# Patient Record
Sex: MALE | Race: WHITE | NOT HISPANIC OR LATINO | Employment: OTHER | URBAN - NONMETROPOLITAN AREA
[De-identification: names, ages, dates, MRNs, and addresses within clinical notes are randomized per-mention and may not be internally consistent; named-entity substitution may affect disease eponyms.]

---

## 2023-03-21 DIAGNOSIS — F90.9 ATTENTION DEFICIT HYPERACTIVITY DISORDER (ADHD), UNSPECIFIED ADHD TYPE: ICD-10-CM

## 2023-03-21 RX ORDER — METHYLPHENIDATE HYDROCHLORIDE 5 MG/1
1 TABLET ORAL DAILY
COMMUNITY
Start: 2019-11-21 | End: 2023-03-21 | Stop reason: SDUPTHER

## 2023-03-21 RX ORDER — METHYLPHENIDATE HYDROCHLORIDE 5 MG/1
5 TABLET ORAL DAILY
Qty: 30 TABLET | Refills: 0 | Status: SHIPPED | OUTPATIENT
Start: 2023-03-21 | End: 2023-04-21 | Stop reason: SDUPTHER

## 2023-04-21 DIAGNOSIS — F90.9 ATTENTION DEFICIT HYPERACTIVITY DISORDER (ADHD), UNSPECIFIED ADHD TYPE: ICD-10-CM

## 2023-04-21 RX ORDER — METHYLPHENIDATE HYDROCHLORIDE 5 MG/1
5 TABLET ORAL DAILY
Qty: 30 TABLET | Refills: 0 | Status: SHIPPED | OUTPATIENT
Start: 2023-04-21 | End: 2023-05-22 | Stop reason: SDUPTHER

## 2023-05-17 DIAGNOSIS — R60.9 EDEMA, UNSPECIFIED: ICD-10-CM

## 2023-05-17 DIAGNOSIS — I10 ESSENTIAL (PRIMARY) HYPERTENSION: ICD-10-CM

## 2023-05-17 RX ORDER — METOPROLOL SUCCINATE 25 MG/1
TABLET, EXTENDED RELEASE ORAL
Qty: 45 TABLET | Refills: 3 | Status: SHIPPED | OUTPATIENT
Start: 2023-05-17 | End: 2024-03-04

## 2023-05-17 RX ORDER — FUROSEMIDE 20 MG/1
TABLET ORAL
Qty: 90 TABLET | Refills: 1 | Status: SHIPPED | OUTPATIENT
Start: 2023-05-17 | End: 2023-10-09

## 2023-05-22 DIAGNOSIS — F90.9 ATTENTION DEFICIT HYPERACTIVITY DISORDER (ADHD), UNSPECIFIED ADHD TYPE: ICD-10-CM

## 2023-05-23 RX ORDER — METHYLPHENIDATE HYDROCHLORIDE 5 MG/1
5 TABLET ORAL DAILY
Qty: 30 TABLET | Refills: 0 | Status: SHIPPED | OUTPATIENT
Start: 2023-05-23 | End: 2023-06-20 | Stop reason: SDUPTHER

## 2023-06-15 DIAGNOSIS — E78.2 MIXED HYPERLIPIDEMIA: ICD-10-CM

## 2023-06-15 DIAGNOSIS — K21.9 GASTROESOPHAGEAL REFLUX DISEASE WITHOUT ESOPHAGITIS: Primary | ICD-10-CM

## 2023-06-15 RX ORDER — ATORVASTATIN CALCIUM 40 MG/1
40 TABLET, FILM COATED ORAL NIGHTLY
COMMUNITY
End: 2023-06-15 | Stop reason: SDUPTHER

## 2023-06-15 RX ORDER — OMEPRAZOLE 20 MG/1
20 CAPSULE, DELAYED RELEASE ORAL DAILY
Qty: 90 CAPSULE | Refills: 3 | Status: SHIPPED | OUTPATIENT
Start: 2023-06-15 | End: 2024-03-04 | Stop reason: SDUPTHER

## 2023-06-15 RX ORDER — ATORVASTATIN CALCIUM 40 MG/1
40 TABLET, FILM COATED ORAL NIGHTLY
Qty: 90 TABLET | Refills: 3 | Status: SHIPPED | OUTPATIENT
Start: 2023-06-15 | End: 2024-03-04 | Stop reason: SDUPTHER

## 2023-06-15 RX ORDER — OMEPRAZOLE 20 MG/1
20 CAPSULE, DELAYED RELEASE ORAL DAILY
COMMUNITY
End: 2023-06-15 | Stop reason: SDUPTHER

## 2023-06-20 DIAGNOSIS — F90.9 ATTENTION DEFICIT HYPERACTIVITY DISORDER (ADHD), UNSPECIFIED ADHD TYPE: ICD-10-CM

## 2023-06-20 RX ORDER — METHYLPHENIDATE HYDROCHLORIDE 5 MG/1
5 TABLET ORAL DAILY
Qty: 30 TABLET | Refills: 0 | Status: SHIPPED | OUTPATIENT
Start: 2023-06-20 | End: 2023-07-21 | Stop reason: SDUPTHER

## 2023-07-18 DIAGNOSIS — I10 ESSENTIAL (PRIMARY) HYPERTENSION: ICD-10-CM

## 2023-07-18 RX ORDER — AMLODIPINE BESYLATE 5 MG/1
TABLET ORAL
Qty: 90 TABLET | Refills: 1 | Status: SHIPPED | OUTPATIENT
Start: 2023-07-18 | End: 2023-11-13

## 2023-07-21 DIAGNOSIS — F90.9 ATTENTION DEFICIT HYPERACTIVITY DISORDER (ADHD), UNSPECIFIED ADHD TYPE: ICD-10-CM

## 2023-07-21 RX ORDER — METHYLPHENIDATE HYDROCHLORIDE 5 MG/1
5 TABLET ORAL DAILY
Qty: 30 TABLET | Refills: 0 | Status: SHIPPED | OUTPATIENT
Start: 2023-07-21 | End: 2023-08-21 | Stop reason: SDUPTHER

## 2023-07-26 RX ORDER — BUPROPION HYDROCHLORIDE 150 MG/1
150 TABLET ORAL EVERY MORNING
COMMUNITY
Start: 2022-10-27 | End: 2023-11-07 | Stop reason: HOSPADM

## 2023-07-26 RX ORDER — TRAZODONE HYDROCHLORIDE 50 MG/1
TABLET ORAL
COMMUNITY
Start: 2023-02-22 | End: 2023-11-07 | Stop reason: HOSPADM

## 2023-07-26 RX ORDER — ASPIRIN 81 MG/1
1 TABLET ORAL DAILY
COMMUNITY
Start: 2019-11-21

## 2023-07-26 RX ORDER — BUPROPION HYDROCHLORIDE 300 MG/1
TABLET ORAL
COMMUNITY
Start: 2023-06-20

## 2023-07-26 RX ORDER — LISINOPRIL 10 MG/1
10 TABLET ORAL DAILY
COMMUNITY
End: 2023-08-11

## 2023-07-26 RX ORDER — VIBEGRON 75 MG/1
1 TABLET, FILM COATED ORAL DAILY
COMMUNITY
Start: 2022-11-02 | End: 2023-11-07 | Stop reason: HOSPADM

## 2023-07-26 RX ORDER — APIXABAN 5 MG/1
5 TABLET, FILM COATED ORAL 2 TIMES DAILY
COMMUNITY
End: 2024-03-04

## 2023-07-27 ENCOUNTER — OFFICE VISIT (OUTPATIENT)
Dept: PRIMARY CARE | Facility: CLINIC | Age: 78
End: 2023-07-27
Payer: MEDICARE

## 2023-07-27 VITALS
BODY MASS INDEX: 34.72 KG/M2 | HEART RATE: 71 BPM | SYSTOLIC BLOOD PRESSURE: 136 MMHG | DIASTOLIC BLOOD PRESSURE: 80 MMHG | WEIGHT: 216 LBS | HEIGHT: 66 IN

## 2023-07-27 DIAGNOSIS — K21.9 GASTROESOPHAGEAL REFLUX DISEASE WITHOUT ESOPHAGITIS: ICD-10-CM

## 2023-07-27 DIAGNOSIS — F41.9 ANXIETY: ICD-10-CM

## 2023-07-27 DIAGNOSIS — Z13.29 SCREENING FOR THYROID DISORDER: Primary | ICD-10-CM

## 2023-07-27 DIAGNOSIS — F33.41 RECURRENT MAJOR DEPRESSIVE DISORDER, IN PARTIAL REMISSION (CMS-HCC): ICD-10-CM

## 2023-07-27 DIAGNOSIS — I77.810 AORTIC ROOT DILATATION (CMS-HCC): ICD-10-CM

## 2023-07-27 DIAGNOSIS — I48.21 PERMANENT ATRIAL FIBRILLATION (MULTI): ICD-10-CM

## 2023-07-27 DIAGNOSIS — N52.9 ERECTILE DYSFUNCTION, UNSPECIFIED ERECTILE DYSFUNCTION TYPE: ICD-10-CM

## 2023-07-27 DIAGNOSIS — N40.1 BENIGN PROSTATIC HYPERPLASIA WITH URINARY OBSTRUCTION AND OTHER LOWER URINARY TRACT SYMPTOMS: ICD-10-CM

## 2023-07-27 DIAGNOSIS — E66.01 CLASS 2 SEVERE OBESITY WITH BODY MASS INDEX (BMI) OF 35 TO 39.9 WITH SERIOUS COMORBIDITY (MULTI): ICD-10-CM

## 2023-07-27 DIAGNOSIS — Z79.899 CONTROLLED SUBSTANCE AGREEMENT SIGNED: ICD-10-CM

## 2023-07-27 DIAGNOSIS — I50.22 CHRONIC SYSTOLIC CONGESTIVE HEART FAILURE (MULTI): ICD-10-CM

## 2023-07-27 DIAGNOSIS — N13.8 BENIGN PROSTATIC HYPERPLASIA WITH URINARY OBSTRUCTION AND OTHER LOWER URINARY TRACT SYMPTOMS: ICD-10-CM

## 2023-07-27 DIAGNOSIS — I10 ESSENTIAL HYPERTENSION: ICD-10-CM

## 2023-07-27 PROBLEM — E66.812 CLASS 2 SEVERE OBESITY WITH BODY MASS INDEX (BMI) OF 35 TO 39.9 WITH SERIOUS COMORBIDITY: Status: ACTIVE | Noted: 2023-07-27

## 2023-07-27 PROBLEM — F32.A DEPRESSION: Status: ACTIVE | Noted: 2023-07-27

## 2023-07-27 PROCEDURE — 3079F DIAST BP 80-89 MM HG: CPT | Performed by: INTERNAL MEDICINE

## 2023-07-27 PROCEDURE — 80365 DRUG SCREENING OXYCODONE: CPT

## 2023-07-27 PROCEDURE — 99214 OFFICE O/P EST MOD 30 MIN: CPT | Performed by: INTERNAL MEDICINE

## 2023-07-27 PROCEDURE — 80368 SEDATIVE HYPNOTICS: CPT

## 2023-07-27 PROCEDURE — 80354 DRUG SCREENING FENTANYL: CPT

## 2023-07-27 PROCEDURE — 80358 DRUG SCREENING METHADONE: CPT

## 2023-07-27 PROCEDURE — 80307 DRUG TEST PRSMV CHEM ANLYZR: CPT

## 2023-07-27 PROCEDURE — 1159F MED LIST DOCD IN RCRD: CPT | Performed by: INTERNAL MEDICINE

## 2023-07-27 PROCEDURE — 80373 DRUG SCREENING TRAMADOL: CPT

## 2023-07-27 PROCEDURE — 80361 OPIATES 1 OR MORE: CPT

## 2023-07-27 PROCEDURE — 1036F TOBACCO NON-USER: CPT | Performed by: INTERNAL MEDICINE

## 2023-07-27 PROCEDURE — 3075F SYST BP GE 130 - 139MM HG: CPT | Performed by: INTERNAL MEDICINE

## 2023-07-27 PROCEDURE — 80346 BENZODIAZEPINES1-12: CPT

## 2023-07-27 PROCEDURE — 1160F RVW MEDS BY RX/DR IN RCRD: CPT | Performed by: INTERNAL MEDICINE

## 2023-07-27 ASSESSMENT — ENCOUNTER SYMPTOMS
BACK PAIN: 0
WHEEZING: 0
SINUS PRESSURE: 0
VOMITING: 0
COUGH: 0
APPETITE CHANGE: 0
ABDOMINAL DISTENTION: 0
ACTIVITY CHANGE: 0
SHORTNESS OF BREATH: 0
CHILLS: 0
WEAKNESS: 0
NUMBNESS: 0
SORE THROAT: 0
NAUSEA: 0
DEPRESSION: 1
DIARRHEA: 0
FATIGUE: 0
SINUS PAIN: 0

## 2023-07-27 ASSESSMENT — PATIENT HEALTH QUESTIONNAIRE - PHQ9
2. FEELING DOWN, DEPRESSED OR HOPELESS: SEVERAL DAYS
SUM OF ALL RESPONSES TO PHQ9 QUESTIONS 1 AND 2: 2
1. LITTLE INTEREST OR PLEASURE IN DOING THINGS: SEVERAL DAYS

## 2023-07-27 NOTE — PROGRESS NOTES
"Subjective   Patient ID: Og Cooper is a 78 y.o. male who presents for Follow-up (6 month) and Depression (+mild).  DepressionPatient is not experiencing: shortness of breath.        Patient is here today for 6 mo follow up   Patient reports that he has been doing ok.    Review of Systems   Constitutional:  Negative for activity change, appetite change, chills and fatigue.   HENT:  Negative for congestion, postnasal drip, sinus pressure, sinus pain and sore throat.    Respiratory:  Negative for cough, shortness of breath and wheezing.    Cardiovascular:  Negative for chest pain and leg swelling.   Gastrointestinal:  Negative for abdominal distention, diarrhea, nausea and vomiting.   Musculoskeletal:  Negative for back pain.   Neurological:  Negative for weakness and numbness.   Psychiatric/Behavioral:  Positive for depression.        Objective   /80 (BP Location: Left arm, Patient Position: Sitting, BP Cuff Size: Adult)   Pulse 71   Ht 1.676 m (5' 6\")   Wt 98 kg (216 lb)   BMI 34.86 kg/m²     Physical Exam  Constitutional:       General: He is not in acute distress.     Appearance: Normal appearance.   HENT:      Head: Normocephalic.      Nose: Nose normal.      Mouth/Throat:      Mouth: Mucous membranes are dry.      Pharynx: No oropharyngeal exudate.   Eyes:      General:         Right eye: No discharge.         Left eye: No discharge.      Extraocular Movements: Extraocular movements intact.      Pupils: Pupils are equal, round, and reactive to light.   Cardiovascular:      Rate and Rhythm: Normal rate and regular rhythm.      Heart sounds: No murmur heard.     No gallop.   Pulmonary:      Effort: Pulmonary effort is normal. No respiratory distress.      Breath sounds: Normal breath sounds. No wheezing.   Musculoskeletal:         General: No swelling. Normal range of motion.   Skin:     General: Skin is warm and dry.      Coloration: Skin is not jaundiced.   Neurological:      General: No focal " deficit present.      Mental Status: He is alert and oriented to person, place, and time.      Cranial Nerves: No cranial nerve deficit.   Psychiatric:         Mood and Affect: Mood normal.         Behavior: Behavior normal.         OARRS:  No data recorded  I have personally reviewed the OARRS report for Og Cooper. I have considered the risks of abuse, dependence, addiction and diversion    Is the patient prescribed a combination of a benzodiazepine and opioid?  No    Last Urine Drug Screen / ordered today: Yes  Recent Results (from the past 67586 hour(s))   OPIATE/OPIOID/BENZO PRESCRIPTION COMPLIANCE    Collection Time: 07/21/22  1:36 PM   Result Value Ref Range    DRUG SCREEN COMMENT URINE SEE BELOW     Creatine, Urine 94.7 mg/dL    Amphetamine Screen, Urine PRESUMPTIVE NEGATIVE NEGATIVE    Barbiturate Screen, Urine PRESUMPTIVE NEGATIVE NEGATIVE    Cannabinoid Screen, Urine PRESUMPTIVE NEGATIVE NEGATIVE    Cocaine Screen, Urine PRESUMPTIVE NEGATIVE NEGATIVE    PCP Screen, Urine PRESUMPTIVE NEGATIVE NEGATIVE    7-Aminoclonazepam <25 Cutoff <25 ng/mL    Alpha-Hydroxyalprazolam <25 Cutoff <25 ng/mL    Alpha-Hydroxymidazolam <25 Cutoff <25 ng/mL    Alprazolam <25 Cutoff <25 ng/mL    Chlordiazepoxide <25 Cutoff <25 ng/mL    Clonazepam <25 Cutoff <25 ng/mL    Diazepam <25 Cutoff <25 ng/mL    Lorazepam <25 Cutoff <25 ng/mL    Midazolam <25 Cutoff <25 ng/mL    Nordiazepam <25 Cutoff <25 ng/mL    Oxazepam <25 Cutoff <25 ng/mL    Temazepam <25 Cutoff <25 ng/mL    Zolpidem <25 Cutoff <25 ng/mL    Zolpidem Metabolite (ZCA) <25 Cutoff <25 ng/mL    6-Acetylmorphine <25 Cutoff <25 ng/mL    Codeine <50 Cutoff <50 ng/mL    Hydrocodone <25 Cutoff <25 ng/mL    Hydromorphone <25 Cutoff <25 ng/mL    Morphine Urine <50 Cutoff <50 ng/mL    Norhydrocodone <25 Cutoff <25 ng/mL    Noroxycodone <25 Cutoff <25 ng/mL    Oxycodone <25 Cutoff <25 ng/mL    Oxymorphone <25 Cutoff <25 ng/mL    Tramadol <50 Cutoff <50 ng/mL     O-Desmethyltramadol <50 Cutoff <50 ng/mL    Fentanyl <2.5 Cutoff<2.5 ng/mL    Norfentanyl <2.5 Cutoff<2.5 ng/mL    METHADONE CONFIRMATION,URINE <25 Cutoff <25 ng/mL    EDDP <25 Cutoff <25 ng/mL     N/A    Controlled Substance Agreement:  Date of the Last Agreement: 07/27/2023  Reviewed Controlled Substance Agreement including but not limited to the benefits, risks, and alternatives to treatment with a Controlled Substance medication(s).    Stimulants:   What is the patient's goal of therapy? depression  Is this being achieved with current treatment? yes    Activities of Daily Living:   Is your overall impression that this patient is benefiting (symptom reduction outweighs side effects) from stimulant therapy? Yes     1. Physical Functioning: Better  2. Family Relationship: Better  3. Social Relationship: Better  4. Mood: Better  5. Sleep Patterns: Better  6. Overall Function: Better      Assessment/Plan   Problem List Items Addressed This Visit       Anxiety    Atrial fibrillation (CMS/HCC)    Benign prostatic hyperplasia with urinary obstruction and other lower urinary tract symptoms    Class 2 severe obesity with body mass index (BMI) of 35 to 39.9 with serious comorbidity (CMS/HCC)    Depression    Erectile dysfunction    Essential hypertension    GERD (gastroesophageal reflux disease)     Other Visit Diagnoses       Screening for thyroid disorder    -  Primary    Controlled substance agreement signed              1. A fib, stable   - follows with cardiology and EP at [facility]   - currently on eliquis 5mg po bid  -  metoprolol 25mg po 0.5tab daily     2. HTN, stable   - continue Norvasc 5mg po daily  -  lisinopril 10mg po daily     3. HLD, stable   - on Lipitor 40mg po daily      4. Depression, stable  - continue Wellbutrin  - methylphenidate on 5mg po daily, CSA and UDS updated today   - I have personally reviewed this patient's OARRS report and found it to be appropriate. The report has been uploaded into the  medical record. I have considered the risks of abuse, addiction, dependence, and diversion and feel that it is clinically appropriate for this patient to be prescribed this controlled medication.         5. Will order tsh per patient request   Final diagnoses:   [Z13.29] Screening for thyroid disorder   [Z79.899] Controlled substance agreement signed   [I10] Essential hypertension   [I48.21] Permanent atrial fibrillation (CMS/[facility])   [E66.01] Class 2 severe obesity with body mass index (BMI) of 35 to 39.9 with serious comorbidity (CMS/[facility])   [K21.9] Gastroesophageal reflux disease without esophagitis   [N52.9] Erectile dysfunction, unspecified erectile dysfunction type   [N40.1, N13.8] Benign prostatic hyperplasia with urinary obstruction and other lower urinary tract symptoms   [F33.41] Recurrent major depressive disorder, in partial remission (CMS/[facility])   [F41.9] Anxiety

## 2023-08-01 LAB
6-ACETYLMORPHINE: <25 NG/ML
7-AMINOCLONAZEPAM: <25 NG/ML
ALPHA-HYDROXYALPRAZOLAM: <25 NG/ML
ALPHA-HYDROXYMIDAZOLAM: <25 NG/ML
ALPRAZOLAM: <25 NG/ML
AMPHETAMINE (PRESENCE) IN URINE BY SCREEN METHOD: NORMAL
BARBITURATES PRESENCE IN URINE BY SCREEN METHOD: NORMAL
CANNABINOIDS IN URINE BY SCREEN METHOD: NORMAL
CHLORDIAZEPOXIDE: <25 NG/ML
CLONAZEPAM: <25 NG/ML
COCAINE (PRESENCE) IN URINE BY SCREEN METHOD: NORMAL
CODEINE: <50 NG/ML
CREATINE, URINE FOR DRUG: 48.8 MG/DL
DIAZEPAM: <25 NG/ML
DRUG SCREEN COMMENT URINE: NORMAL
EDDP: <25 NG/ML
FENTANYL CONFIRMATION, URINE: <2.5 NG/ML
HYDROCODONE: <25 NG/ML
HYDROMORPHONE: <25 NG/ML
LORAZEPAM: <25 NG/ML
METHADONE CONFIRMATION,URINE: <25 NG/ML
MIDAZOLAM: <25 NG/ML
MORPHINE URINE: <50 NG/ML
NORDIAZEPAM: <25 NG/ML
NORFENTANYL: <2.5 NG/ML
NORHYDROCODONE: <25 NG/ML
NOROXYCODONE: <25 NG/ML
O-DESMETHYLTRAMADOL: <50 NG/ML
OXAZEPAM: <25 NG/ML
OXYCODONE: <25 NG/ML
OXYMORPHONE: <25 NG/ML
PHENCYCLIDINE (PRESENCE) IN URINE BY SCREEN METHOD: NORMAL
TEMAZEPAM: <25 NG/ML
TRAMADOL: <50 NG/ML
ZOLPIDEM METABOLITE (ZCA): <25 NG/ML
ZOLPIDEM: <25 NG/ML

## 2023-08-11 DIAGNOSIS — I10 ESSENTIAL (PRIMARY) HYPERTENSION: ICD-10-CM

## 2023-08-11 RX ORDER — LISINOPRIL 10 MG/1
10 TABLET ORAL DAILY
Qty: 90 TABLET | Refills: 3 | Status: SHIPPED | OUTPATIENT
Start: 2023-08-11 | End: 2024-03-04 | Stop reason: SDUPTHER

## 2023-08-21 DIAGNOSIS — F90.9 ATTENTION DEFICIT HYPERACTIVITY DISORDER (ADHD), UNSPECIFIED ADHD TYPE: ICD-10-CM

## 2023-08-22 RX ORDER — METHYLPHENIDATE HYDROCHLORIDE 5 MG/1
5 TABLET ORAL DAILY
Qty: 30 TABLET | Refills: 0 | Status: SHIPPED | OUTPATIENT
Start: 2023-08-22 | End: 2023-09-20 | Stop reason: SDUPTHER

## 2023-08-28 ENCOUNTER — OFFICE VISIT (OUTPATIENT)
Dept: PRIMARY CARE | Facility: CLINIC | Age: 78
End: 2023-08-28
Payer: MEDICARE

## 2023-08-28 VITALS
WEIGHT: 216 LBS | BODY MASS INDEX: 34.72 KG/M2 | DIASTOLIC BLOOD PRESSURE: 78 MMHG | HEART RATE: 69 BPM | SYSTOLIC BLOOD PRESSURE: 133 MMHG | HEIGHT: 66 IN

## 2023-08-28 DIAGNOSIS — G89.29 CHRONIC BILATERAL LOW BACK PAIN WITHOUT SCIATICA: Primary | ICD-10-CM

## 2023-08-28 DIAGNOSIS — M54.50 CHRONIC BILATERAL LOW BACK PAIN WITHOUT SCIATICA: Primary | ICD-10-CM

## 2023-08-28 PROCEDURE — 1036F TOBACCO NON-USER: CPT | Performed by: INTERNAL MEDICINE

## 2023-08-28 PROCEDURE — 1160F RVW MEDS BY RX/DR IN RCRD: CPT | Performed by: INTERNAL MEDICINE

## 2023-08-28 PROCEDURE — 3078F DIAST BP <80 MM HG: CPT | Performed by: INTERNAL MEDICINE

## 2023-08-28 PROCEDURE — 99213 OFFICE O/P EST LOW 20 MIN: CPT | Performed by: INTERNAL MEDICINE

## 2023-08-28 PROCEDURE — 1159F MED LIST DOCD IN RCRD: CPT | Performed by: INTERNAL MEDICINE

## 2023-08-28 PROCEDURE — 3075F SYST BP GE 130 - 139MM HG: CPT | Performed by: INTERNAL MEDICINE

## 2023-08-28 RX ORDER — LIDOCAINE 50 MG/G
1 PATCH TOPICAL DAILY
Qty: 10 PATCH | Refills: 0 | Status: SHIPPED | OUTPATIENT
Start: 2023-08-28 | End: 2023-11-07 | Stop reason: HOSPADM

## 2023-08-28 ASSESSMENT — ENCOUNTER SYMPTOMS
APPETITE CHANGE: 0
WHEEZING: 0
NAUSEA: 0
BACK PAIN: 0
SINUS PRESSURE: 0
WEAKNESS: 0
SORE THROAT: 0
NUMBNESS: 0
COUGH: 0
CHILLS: 0
ABDOMINAL DISTENTION: 0
SHORTNESS OF BREATH: 0
DIARRHEA: 0
FATIGUE: 0
SINUS PAIN: 0
ACTIVITY CHANGE: 0
VOMITING: 0

## 2023-08-28 NOTE — PROGRESS NOTES
"Subjective   Patient ID: Og Cooper is a 78 y.o. male who presents for Hip Pain (RT side/+Leg pain/Present for 2+ weeks).  HPI  Patient is here today for hip pain.   Patient is here today with his daughter.  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 The day after this last excursion he was quite achey and tried.     Review of Systems   Constitutional:  Negative for activity change, appetite change, chills and fatigue.   HENT:  Negative for congestion, postnasal drip, sinus pressure, sinus pain and sore throat.    Respiratory:  Negative for cough, shortness of breath and wheezing.    Cardiovascular:  Negative for chest pain and leg swelling.   Gastrointestinal:  Negative for abdominal distention, diarrhea, nausea and vomiting.   Musculoskeletal:  Negative for back pain.   Neurological:  Negative for weakness and numbness.       Objective   /78 (BP Location: Left arm, Patient Position: Sitting, BP Cuff Size: Adult)   Pulse 69   Ht 1.676 m (5' 6\")   Wt 98 kg (216 lb)   BMI 34.86 kg/m²     Physical Exam  Constitutional:       General: He is not in acute distress.     Appearance: Normal appearance.   HENT:      Head: Normocephalic.      Nose: Nose normal.      Mouth/Throat:      Mouth: Mucous membranes are dry.      Pharynx: No oropharyngeal exudate.   Eyes:      General:         Right eye: No discharge.         Left eye: No discharge.      Extraocular Movements: Extraocular movements intact.      Pupils: Pupils are equal, round, and reactive to light.   Cardiovascular:      Rate and Rhythm: Normal rate and regular rhythm.      Heart sounds: No murmur heard.     No gallop.   Pulmonary:      Effort: Pulmonary effort is normal. No respiratory distress.      Breath sounds: Normal breath sounds. No " wheezing.   Musculoskeletal:         General: Tenderness present. No swelling. Normal range of motion.      Comments: +straight leg slightly positive at the right    Skin:     General: Skin is warm and dry.      Coloration: Skin is not jaundiced.   Neurological:      General: No focal deficit present.      Mental Status: He is alert and oriented to person, place, and time.      Cranial Nerves: No cranial nerve deficit.   Psychiatric:         Mood and Affect: Mood normal.         Behavior: Behavior normal.           Assessment/Plan   Problem List Items Addressed This Visit    None  Visit Diagnoses       Chronic bilateral low back pain without sciatica    -  Primary    Relevant Medications    lidocaine (Lidoderm) 5 % patch    Other Relevant Orders    XR lumbar spine 2-3 views    Referral to Physical Therapy          1. Back pain   - reviewed right hip xray, will order lumbar   - on prednisone taper   - continue tylenol   - order lidoderm patches   - will order pt   Final diagnoses:   [M54.50, G89.29] Chronic bilateral low back pain without sciatica

## 2023-08-31 ENCOUNTER — TELEMEDICINE (OUTPATIENT)
Dept: PRIMARY CARE | Facility: CLINIC | Age: 78
End: 2023-08-31
Payer: MEDICARE

## 2023-08-31 DIAGNOSIS — M47.816 SPONDYLOSIS OF LUMBAR REGION WITHOUT MYELOPATHY OR RADICULOPATHY: Primary | ICD-10-CM

## 2023-08-31 DIAGNOSIS — U07.1 COVID-19: ICD-10-CM

## 2023-08-31 PROCEDURE — 99213 OFFICE O/P EST LOW 20 MIN: CPT | Performed by: INTERNAL MEDICINE

## 2023-08-31 ASSESSMENT — ENCOUNTER SYMPTOMS
FATIGUE: 1
HIP PAIN: 1
FEVER: 1
RHINORRHEA: 1

## 2023-08-31 NOTE — PROGRESS NOTES
Subjective   Patient ID: Og Cooper is a 78 y.o. male who presents for Hip Pain.  Hip Pain     Patient is here today for virtual visit.  Patient and physician are at two separate locations.  Pt was verbally consented for the encounter.   Patient is here today for follow up on Hip pain.  Patient did get xray, lumbar xray shows moderate degenerative changes at L2-3, l3-l4, l5-s1.  He had made this appt because he was coming down with a cold,  but he tested positive for covid; there is an outbreak in his [facility] care. He is having coughing and increased congestion.   Patient reports that now that he is down resting his hip pain has actually improved some.     Review of Systems   Constitutional:  Positive for fatigue and fever.   HENT:  Positive for congestion and rhinorrhea.        Objective   There were no vitals taken for this visit.    Physical Exam  No physical exam due to being a virtual visit.       Assessment/Plan   Problem List Items Addressed This Visit       Spondylosis of lumbar region without myelopathy or radiculopathy - Primary    COVID-19   COVID19  - pt reports very mild symptoms, declines paxlovid or further medication at this time, advised if he changes his mild or worsens to please call     2. Mod lumbar deg changes, I think this is causing referred pain to his hip   - can proceed with therapy as soon as out of covid isolation     15 min was spent on virtual encounter with pt.         Final diagnoses:   [M47.816] Spondylosis of lumbar region without myelopathy or radiculopathy   [U07.1] COVID-19

## 2023-09-20 DIAGNOSIS — F90.9 ATTENTION DEFICIT HYPERACTIVITY DISORDER (ADHD), UNSPECIFIED ADHD TYPE: ICD-10-CM

## 2023-09-20 RX ORDER — METHYLPHENIDATE HYDROCHLORIDE 5 MG/1
5 TABLET ORAL DAILY
Qty: 30 TABLET | Refills: 0 | Status: SHIPPED | OUTPATIENT
Start: 2023-09-20 | End: 2023-10-23 | Stop reason: SDUPTHER

## 2023-10-08 DIAGNOSIS — R60.9 EDEMA, UNSPECIFIED: ICD-10-CM

## 2023-10-09 RX ORDER — FUROSEMIDE 20 MG/1
TABLET ORAL
Qty: 90 TABLET | Refills: 1 | Status: SHIPPED | OUTPATIENT
Start: 2023-10-09 | End: 2024-03-04

## 2023-10-17 ENCOUNTER — LAB (OUTPATIENT)
Dept: LAB | Facility: LAB | Age: 78
End: 2023-10-17
Payer: MEDICARE

## 2023-10-17 DIAGNOSIS — Z13.29 SCREENING FOR THYROID DISORDER: ICD-10-CM

## 2023-10-17 LAB — TSH SERPL-ACNC: 0.79 MIU/L (ref 0.44–3.98)

## 2023-10-17 PROCEDURE — 84443 ASSAY THYROID STIM HORMONE: CPT

## 2023-10-17 PROCEDURE — 36415 COLL VENOUS BLD VENIPUNCTURE: CPT

## 2023-10-23 DIAGNOSIS — F90.9 ATTENTION DEFICIT HYPERACTIVITY DISORDER (ADHD), UNSPECIFIED ADHD TYPE: ICD-10-CM

## 2023-10-23 RX ORDER — METHYLPHENIDATE HYDROCHLORIDE 5 MG/1
5 TABLET ORAL DAILY
Qty: 30 TABLET | Refills: 0 | Status: SHIPPED | OUTPATIENT
Start: 2023-10-23 | End: 2023-11-22 | Stop reason: SDUPTHER

## 2023-10-26 ENCOUNTER — APPOINTMENT (OUTPATIENT)
Dept: PRIMARY CARE | Facility: CLINIC | Age: 78
End: 2023-10-26
Payer: MEDICARE

## 2023-10-30 ENCOUNTER — TELEPHONE (OUTPATIENT)
Dept: PRIMARY CARE | Facility: CLINIC | Age: 78
End: 2023-10-30
Payer: MEDICARE

## 2023-10-30 DIAGNOSIS — F41.9 ANXIETY: Primary | ICD-10-CM

## 2023-10-30 NOTE — TELEPHONE ENCOUNTER
Would like something added to his Wellbutrin; has tried Lexapro 10 in the past and would like to go back on this medication

## 2023-11-05 ENCOUNTER — APPOINTMENT (OUTPATIENT)
Dept: CARDIOLOGY | Facility: HOSPITAL | Age: 78
End: 2023-11-05
Payer: MEDICARE

## 2023-11-05 ENCOUNTER — APPOINTMENT (OUTPATIENT)
Dept: RADIOLOGY | Facility: HOSPITAL | Age: 78
End: 2023-11-05
Payer: MEDICARE

## 2023-11-05 ENCOUNTER — HOSPITAL ENCOUNTER (OUTPATIENT)
Facility: HOSPITAL | Age: 78
Setting detail: OBSERVATION
Discharge: HOME | End: 2023-11-07
Attending: EMERGENCY MEDICINE | Admitting: HOSPITALIST
Payer: MEDICARE

## 2023-11-05 DIAGNOSIS — R29.898 RIGHT HAND WEAKNESS: ICD-10-CM

## 2023-11-05 DIAGNOSIS — I63.9 CEREBROVASCULAR ACCIDENT (CVA), UNSPECIFIED MECHANISM (MULTI): Primary | ICD-10-CM

## 2023-11-05 DIAGNOSIS — I63.89 OTHER CEREBRAL INFARCTION (MULTI): ICD-10-CM

## 2023-11-05 DIAGNOSIS — G45.8 OTHER TRANSIENT CEREBRAL ISCHEMIC ATTACKS AND RELATED SYNDROMES: ICD-10-CM

## 2023-11-05 DIAGNOSIS — R09.89 OTHER SPECIFIED SYMPTOMS AND SIGNS INVOLVING THE CIRCULATORY AND RESPIRATORY SYSTEMS: ICD-10-CM

## 2023-11-05 DIAGNOSIS — G45.9 TIA (TRANSIENT ISCHEMIC ATTACK): ICD-10-CM

## 2023-11-05 LAB
ALBUMIN SERPL BCP-MCNC: 4.2 G/DL (ref 3.4–5)
ALBUMIN SERPL BCP-MCNC: 4.2 G/DL (ref 3.4–5)
ALP SERPL-CCNC: 93 U/L (ref 33–136)
ALP SERPL-CCNC: 93 U/L (ref 33–136)
ALT SERPL W P-5'-P-CCNC: 23 U/L (ref 10–52)
ALT SERPL W P-5'-P-CCNC: 23 U/L (ref 10–52)
ANION GAP SERPL CALC-SCNC: 9 MMOL/L (ref 10–20)
APPEARANCE UR: CLEAR
APTT PPP: 39 SECONDS (ref 27–38)
AST SERPL W P-5'-P-CCNC: 25 U/L (ref 9–39)
AST SERPL W P-5'-P-CCNC: 25 U/L (ref 9–39)
BASOPHILS # BLD AUTO: 0.03 X10*3/UL (ref 0–0.1)
BASOPHILS NFR BLD AUTO: 0.4 %
BILIRUB DIRECT SERPL-MCNC: 0.1 MG/DL (ref 0–0.3)
BILIRUB SERPL-MCNC: 0.6 MG/DL (ref 0–1.2)
BILIRUB SERPL-MCNC: 0.8 MG/DL (ref 0–1.2)
BILIRUB UR STRIP.AUTO-MCNC: NEGATIVE MG/DL
BNP SERPL-MCNC: 271 PG/ML (ref 0–99)
BUN SERPL-MCNC: 28 MG/DL (ref 6–23)
CALCIUM SERPL-MCNC: 9 MG/DL (ref 8.6–10.3)
CARDIAC TROPONIN I PNL SERPL HS: 31 NG/L (ref 0–20)
CHLORIDE SERPL-SCNC: 107 MMOL/L (ref 98–107)
CHOLEST SERPL-MCNC: 147 MG/DL (ref 0–199)
CHOLESTEROL/HDL RATIO: 4.3
CO2 SERPL-SCNC: 26 MMOL/L (ref 21–32)
COLOR UR: NORMAL
CREAT SERPL-MCNC: 1.39 MG/DL (ref 0.5–1.3)
EOSINOPHIL # BLD AUTO: 0.53 X10*3/UL (ref 0–0.4)
EOSINOPHIL NFR BLD AUTO: 6.6 %
ERYTHROCYTE [DISTWIDTH] IN BLOOD BY AUTOMATED COUNT: 14.7 % (ref 11.5–14.5)
GFR SERPL CREATININE-BSD FRML MDRD: 53 ML/MIN/1.73M*2
GLUCOSE SERPL-MCNC: 117 MG/DL (ref 74–99)
GLUCOSE UR STRIP.AUTO-MCNC: NEGATIVE MG/DL
HCT VFR BLD AUTO: 41.3 % (ref 41–52)
HDLC SERPL-MCNC: 34 MG/DL
HGB BLD-MCNC: 13.2 G/DL (ref 13.5–17.5)
HOLD SPECIMEN: NORMAL
IMM GRANULOCYTES # BLD AUTO: 0.03 X10*3/UL (ref 0–0.5)
IMM GRANULOCYTES NFR BLD AUTO: 0.4 % (ref 0–0.9)
INR PPP: 1.7 (ref 0.9–1.1)
KETONES UR STRIP.AUTO-MCNC: NEGATIVE MG/DL
LDLC SERPL CALC-MCNC: 88 MG/DL
LEUKOCYTE ESTERASE UR QL STRIP.AUTO: NEGATIVE
LYMPHOCYTES # BLD AUTO: 1.39 X10*3/UL (ref 0.8–3)
LYMPHOCYTES NFR BLD AUTO: 17.4 %
MCH RBC QN AUTO: 27.2 PG (ref 26–34)
MCHC RBC AUTO-ENTMCNC: 32 G/DL (ref 32–36)
MCV RBC AUTO: 85 FL (ref 80–100)
MONOCYTES # BLD AUTO: 0.66 X10*3/UL (ref 0.05–0.8)
MONOCYTES NFR BLD AUTO: 8.3 %
NEUTROPHILS # BLD AUTO: 5.36 X10*3/UL (ref 1.6–5.5)
NEUTROPHILS NFR BLD AUTO: 66.9 %
NITRITE UR QL STRIP.AUTO: NEGATIVE
NON HDL CHOLESTEROL: 113 MG/DL (ref 0–149)
NRBC BLD-RTO: 0 /100 WBCS (ref 0–0)
PH UR STRIP.AUTO: 5 [PH]
PLATELET # BLD AUTO: 161 X10*3/UL (ref 150–450)
POTASSIUM SERPL-SCNC: 4.4 MMOL/L (ref 3.5–5.3)
PROT SERPL-MCNC: 6.7 G/DL (ref 6.4–8.2)
PROT SERPL-MCNC: 7.1 G/DL (ref 6.4–8.2)
PROT UR STRIP.AUTO-MCNC: NEGATIVE MG/DL
PROTHROMBIN TIME: 19.2 SECONDS (ref 9.8–12.8)
RBC # BLD AUTO: 4.86 X10*6/UL (ref 4.5–5.9)
RBC # UR STRIP.AUTO: NEGATIVE /UL
SODIUM SERPL-SCNC: 138 MMOL/L (ref 136–145)
SP GR UR STRIP.AUTO: 1.01
TRIGL SERPL-MCNC: 127 MG/DL (ref 0–149)
UROBILINOGEN UR STRIP.AUTO-MCNC: <2 MG/DL
VLDL: 25 MG/DL (ref 0–40)
WBC # BLD AUTO: 8 X10*3/UL (ref 4.4–11.3)

## 2023-11-05 PROCEDURE — 70450 CT HEAD/BRAIN W/O DYE: CPT

## 2023-11-05 PROCEDURE — 85610 PROTHROMBIN TIME: CPT | Performed by: PHYSICIAN ASSISTANT

## 2023-11-05 PROCEDURE — 2500000001 HC RX 250 WO HCPCS SELF ADMINISTERED DRUGS (ALT 637 FOR MEDICARE OP): Performed by: HOSPITALIST

## 2023-11-05 PROCEDURE — 80053 COMPREHEN METABOLIC PANEL: CPT | Performed by: PHYSICIAN ASSISTANT

## 2023-11-05 PROCEDURE — 99223 1ST HOSP IP/OBS HIGH 75: CPT | Performed by: HOSPITALIST

## 2023-11-05 PROCEDURE — 93005 ELECTROCARDIOGRAM TRACING: CPT

## 2023-11-05 PROCEDURE — 85730 THROMBOPLASTIN TIME PARTIAL: CPT | Performed by: PHYSICIAN ASSISTANT

## 2023-11-05 PROCEDURE — 94660 CPAP INITIATION&MGMT: CPT

## 2023-11-05 PROCEDURE — 96360 HYDRATION IV INFUSION INIT: CPT

## 2023-11-05 PROCEDURE — 36415 COLL VENOUS BLD VENIPUNCTURE: CPT | Performed by: PHYSICIAN ASSISTANT

## 2023-11-05 PROCEDURE — 83880 ASSAY OF NATRIURETIC PEPTIDE: CPT | Performed by: HOSPITALIST

## 2023-11-05 PROCEDURE — 36415 COLL VENOUS BLD VENIPUNCTURE: CPT | Performed by: HOSPITALIST

## 2023-11-05 PROCEDURE — 99285 EMERGENCY DEPT VISIT HI MDM: CPT | Mod: 25 | Performed by: EMERGENCY MEDICINE

## 2023-11-05 PROCEDURE — 80076 HEPATIC FUNCTION PANEL: CPT | Mod: CCI | Performed by: HOSPITALIST

## 2023-11-05 PROCEDURE — 9420000001 HC RT PATIENT EDUCATION 5 MIN

## 2023-11-05 PROCEDURE — G0378 HOSPITAL OBSERVATION PER HR: HCPCS

## 2023-11-05 PROCEDURE — 94664 DEMO&/EVAL PT USE INHALER: CPT

## 2023-11-05 PROCEDURE — 80061 LIPID PANEL: CPT | Performed by: HOSPITALIST

## 2023-11-05 PROCEDURE — 71045 X-RAY EXAM CHEST 1 VIEW: CPT | Mod: FY,FR

## 2023-11-05 PROCEDURE — 2500000004 HC RX 250 GENERAL PHARMACY W/ HCPCS (ALT 636 FOR OP/ED): Performed by: PHYSICIAN ASSISTANT

## 2023-11-05 PROCEDURE — 71045 X-RAY EXAM CHEST 1 VIEW: CPT | Mod: FOREIGN READ | Performed by: RADIOLOGY

## 2023-11-05 PROCEDURE — 84484 ASSAY OF TROPONIN QUANT: CPT | Performed by: PHYSICIAN ASSISTANT

## 2023-11-05 PROCEDURE — 81003 URINALYSIS AUTO W/O SCOPE: CPT | Performed by: PHYSICIAN ASSISTANT

## 2023-11-05 PROCEDURE — 93010 ELECTROCARDIOGRAM REPORT: CPT | Performed by: STUDENT IN AN ORGANIZED HEALTH CARE EDUCATION/TRAINING PROGRAM

## 2023-11-05 PROCEDURE — 70450 CT HEAD/BRAIN W/O DYE: CPT | Performed by: RADIOLOGY

## 2023-11-05 PROCEDURE — 85025 COMPLETE CBC W/AUTO DIFF WBC: CPT | Performed by: PHYSICIAN ASSISTANT

## 2023-11-05 RX ORDER — PANTOPRAZOLE SODIUM 20 MG/1
20 TABLET, DELAYED RELEASE ORAL
Status: DISCONTINUED | OUTPATIENT
Start: 2023-11-06 | End: 2023-11-07 | Stop reason: HOSPADM

## 2023-11-05 RX ORDER — ASPIRIN 81 MG/1
81 TABLET ORAL DAILY
Status: DISCONTINUED | OUTPATIENT
Start: 2023-11-06 | End: 2023-11-07 | Stop reason: HOSPADM

## 2023-11-05 RX ORDER — ACETAMINOPHEN, DIPHENHYDRAMINE HCL, PHENYLEPHRINE HCL 325; 25; 5 MG/1; MG/1; MG/1
1 TABLET ORAL NIGHTLY
COMMUNITY

## 2023-11-05 RX ORDER — SODIUM CHLORIDE 9 MG/ML
75 INJECTION, SOLUTION INTRAVENOUS CONTINUOUS
Status: DISCONTINUED | OUTPATIENT
Start: 2023-11-05 | End: 2023-11-06

## 2023-11-05 RX ORDER — TRAZODONE HYDROCHLORIDE 50 MG/1
50 TABLET ORAL NIGHTLY
Status: DISCONTINUED | OUTPATIENT
Start: 2023-11-05 | End: 2023-11-06

## 2023-11-05 RX ORDER — ATORVASTATIN CALCIUM 40 MG/1
40 TABLET, FILM COATED ORAL NIGHTLY
Status: DISCONTINUED | OUTPATIENT
Start: 2023-11-05 | End: 2023-11-07 | Stop reason: HOSPADM

## 2023-11-05 RX ADMIN — ATORVASTATIN CALCIUM 40 MG: 40 TABLET, FILM COATED ORAL at 22:11

## 2023-11-05 RX ADMIN — TRAZODONE HYDROCHLORIDE 50 MG: 50 TABLET ORAL at 22:11

## 2023-11-05 RX ADMIN — SODIUM CHLORIDE 500 ML: 9 INJECTION, SOLUTION INTRAVENOUS at 12:09

## 2023-11-05 SDOH — SOCIAL STABILITY: SOCIAL INSECURITY: DO YOU FEEL ANYONE HAS EXPLOITED OR TAKEN ADVANTAGE OF YOU FINANCIALLY OR OF YOUR PERSONAL PROPERTY?: NO

## 2023-11-05 SDOH — SOCIAL STABILITY: SOCIAL INSECURITY: HAVE YOU HAD THOUGHTS OF HARMING ANYONE ELSE?: NO

## 2023-11-05 SDOH — SOCIAL STABILITY: SOCIAL INSECURITY: DOES ANYONE TRY TO KEEP YOU FROM HAVING/CONTACTING OTHER FRIENDS OR DOING THINGS OUTSIDE YOUR HOME?: NO

## 2023-11-05 SDOH — SOCIAL STABILITY: SOCIAL INSECURITY: ARE THERE ANY APPARENT SIGNS OF INJURIES/BEHAVIORS THAT COULD BE RELATED TO ABUSE/NEGLECT?: NO

## 2023-11-05 SDOH — SOCIAL STABILITY: SOCIAL INSECURITY: HAS ANYONE EVER THREATENED TO HURT YOUR FAMILY OR YOUR PETS?: NO

## 2023-11-05 SDOH — SOCIAL STABILITY: SOCIAL INSECURITY: WERE YOU ABLE TO COMPLETE ALL THE BEHAVIORAL HEALTH SCREENINGS?: YES

## 2023-11-05 SDOH — SOCIAL STABILITY: SOCIAL INSECURITY: ABUSE: ADULT

## 2023-11-05 SDOH — SOCIAL STABILITY: SOCIAL INSECURITY: ARE YOU OR HAVE YOU BEEN THREATENED OR ABUSED PHYSICALLY, EMOTIONALLY, OR SEXUALLY BY ANYONE?: NO

## 2023-11-05 SDOH — SOCIAL STABILITY: SOCIAL INSECURITY: DO YOU FEEL UNSAFE GOING BACK TO THE PLACE WHERE YOU ARE LIVING?: NO

## 2023-11-05 ASSESSMENT — ACTIVITIES OF DAILY LIVING (ADL)
GROOMING: INDEPENDENT
HEARING - RIGHT EAR: FUNCTIONAL
DRESSING YOURSELF: INDEPENDENT
WALKS IN HOME: INDEPENDENT
JUDGMENT_ADEQUATE_SAFELY_COMPLETE_DAILY_ACTIVITIES: YES
TOILETING: INDEPENDENT
PATIENT'S MEMORY ADEQUATE TO SAFELY COMPLETE DAILY ACTIVITIES?: YES
HEARING - LEFT EAR: FUNCTIONAL
FEEDING YOURSELF: INDEPENDENT
LACK_OF_TRANSPORTATION: NO
ADEQUATE_TO_COMPLETE_ADL: YES
BATHING: INDEPENDENT

## 2023-11-05 ASSESSMENT — COGNITIVE AND FUNCTIONAL STATUS - GENERAL
DAILY ACTIVITIY SCORE: 24
MOBILITY SCORE: 24
PATIENT BASELINE BEDBOUND: NO

## 2023-11-05 ASSESSMENT — LIFESTYLE VARIABLES
HOW OFTEN DO YOU HAVE 6 OR MORE DRINKS ON ONE OCCASION: NEVER
PRESCIPTION_ABUSE_PAST_12_MONTHS: NO
HOW MANY STANDARD DRINKS CONTAINING ALCOHOL DO YOU HAVE ON A TYPICAL DAY: PATIENT DOES NOT DRINK
SUBSTANCE_ABUSE_PAST_12_MONTHS: NO
SKIP TO QUESTIONS 9-10: 1
AUDIT-C TOTAL SCORE: 0
HOW OFTEN DO YOU HAVE A DRINK CONTAINING ALCOHOL: NEVER
AUDIT-C TOTAL SCORE: 0

## 2023-11-05 ASSESSMENT — COLUMBIA-SUICIDE SEVERITY RATING SCALE - C-SSRS
1. IN THE PAST MONTH, HAVE YOU WISHED YOU WERE DEAD OR WISHED YOU COULD GO TO SLEEP AND NOT WAKE UP?: NO
2. HAVE YOU ACTUALLY HAD ANY THOUGHTS OF KILLING YOURSELF?: NO
1. IN THE PAST MONTH, HAVE YOU WISHED YOU WERE DEAD OR WISHED YOU COULD GO TO SLEEP AND NOT WAKE UP?: NO
6. HAVE YOU EVER DONE ANYTHING, STARTED TO DO ANYTHING, OR PREPARED TO DO ANYTHING TO END YOUR LIFE?: NO
2. HAVE YOU ACTUALLY HAD ANY THOUGHTS OF KILLING YOURSELF?: NO
6. HAVE YOU EVER DONE ANYTHING, STARTED TO DO ANYTHING, OR PREPARED TO DO ANYTHING TO END YOUR LIFE?: NO

## 2023-11-05 ASSESSMENT — PAIN SCALES - GENERAL: PAINLEVEL_OUTOF10: 0 - NO PAIN

## 2023-11-05 ASSESSMENT — PATIENT HEALTH QUESTIONNAIRE - PHQ9
2. FEELING DOWN, DEPRESSED OR HOPELESS: NOT AT ALL
SUM OF ALL RESPONSES TO PHQ9 QUESTIONS 1 & 2: 0
1. LITTLE INTEREST OR PLEASURE IN DOING THINGS: NOT AT ALL

## 2023-11-05 ASSESSMENT — PAIN - FUNCTIONAL ASSESSMENT: PAIN_FUNCTIONAL_ASSESSMENT: 0-10

## 2023-11-05 NOTE — ED PROVIDER NOTES
HPI   Chief Complaint   Patient presents with    Extremity Weakness     C/o right hand weakness, unsure when it started. States he noticed yesterday while on the computer. Denies any other complaints.        Patient is concerned with right hand weakness  He noticed some trouble using the computer yesterday  Then today felt like he couldn't use a knife as he typically does  He also had trouble buttoning his shirt  No trouble ambulating  No facial droop or slurred speech  He has not noticed any trouble getting or finding his words  Vision is normal  No headache  No lightheadedness or dizziness  No recent illness  No fall or injury        History provided by:  Patient                      No data recorded                Patient History   Past Medical History:   Diagnosis Date    Hypertension     Personal history of other diseases of the circulatory system     History of congestive heart failure     Past Surgical History:   Procedure Laterality Date    CARDIAC SURGERY      OTHER SURGICAL HISTORY  11/21/2019    Nose surgery    OTHER SURGICAL HISTORY  11/21/2019    Heart surgery     No family history on file.  Social History     Tobacco Use    Smoking status: Never    Smokeless tobacco: Never   Substance Use Topics    Alcohol use: Not Currently    Drug use: Never       Physical Exam   ED Triage Vitals [11/05/23 1136]   Temp Heart Rate Resp BP   36.2 °C (97.2 °F) 75 16 169/83      SpO2 Temp Source Heart Rate Source Patient Position   95 % Tympanic Monitor Sitting      BP Location FiO2 (%)     Left arm --       Physical Exam  Vitals and nursing note reviewed.   Constitutional:       General: He is not in acute distress.     Appearance: Normal appearance. He is well-developed and well-groomed. He is obese. He is not ill-appearing, toxic-appearing or diaphoretic.      Comments: Patient ambulates into department holding cup of coffee   HENT:      Head: Normocephalic and atraumatic.      Right Ear: External ear normal.       Left Ear: External ear normal.      Nose: Nose normal.      Mouth/Throat:      Lips: Pink. No lesions.      Mouth: Mucous membranes are moist.      Pharynx: Oropharynx is clear.   Eyes:      General: Lids are normal. No visual field deficit or scleral icterus.     Extraocular Movements: Extraocular movements intact.      Conjunctiva/sclera: Conjunctivae normal.      Pupils: Pupils are equal, round, and reactive to light.   Cardiovascular:      Rate and Rhythm: Normal rate and regular rhythm.      Pulses:           Radial pulses are 2+ on the right side and 2+ on the left side.        Dorsalis pedis pulses are 2+ on the right side and 2+ on the left side.        Posterior tibial pulses are 2+ on the right side and 2+ on the left side.      Heart sounds: Normal heart sounds.   Pulmonary:      Effort: Pulmonary effort is normal.      Breath sounds: Normal breath sounds and air entry.   Abdominal:      General: Bowel sounds are normal. There is no distension.      Palpations: Abdomen is soft.      Tenderness: There is no abdominal tenderness. There is no right CVA tenderness, left CVA tenderness or guarding.   Musculoskeletal:      Cervical back: No tenderness.      Right lower leg: No edema.      Left lower leg: No edema.   Skin:     General: Skin is warm.      Capillary Refill: Capillary refill takes less than 2 seconds.   Neurological:      General: No focal deficit present.      Mental Status: He is alert and oriented to person, place, and time.      Cranial Nerves: No cranial nerve deficit, dysarthria or facial asymmetry.      Sensory: No sensory deficit.      Motor: No weakness or pronator drift.      Coordination: Finger-Nose-Finger Test abnormal.      Gait: Gait is intact.      Comments: Patient had symmetrical  but finger-to-nose was abnormal on the right side. Patient seemed to hold his hand without strength is a flexed wrist position and then seemed to be unable to extend the index finger and control the  motion.   Psychiatric:         Attention and Perception: Attention and perception normal.         Mood and Affect: Mood and affect normal.         Speech: Speech normal.         Behavior: Behavior normal. Behavior is cooperative.         Thought Content: Thought content normal.         Cognition and Memory: Cognition and memory normal.         Judgment: Judgment normal.         ED Course & [facility]   ED Course as of 11/05/23 1335   Sun Nov 05, 2023   [address] Dr[address] cooper []      ED Course User Index  [] Sofi Gomez PA-C         Diagnoses as of 11/05/23 1335   Cerebrovascular accident (CVA), unspecified mechanism (CMS/HCC)   Right hand weakness       Medical Decision Making  Patient is concerned with right hand weakness  He noticed some trouble using the computer yesterday  Then today felt like he couldn't use a knife as he typically does  He also had trouble buttoning his shirt  No trouble ambulating  No facial droop or slurred speech  He has not noticed any trouble getting or finding his words  Vision is normal  No headache  No lightheadedness or dizziness  No recent illness  No fall or injury    Ddx - CVA, TIA, motor weakness, infection, other    Timeline of symptoms starting yesterday - no stroke alert called  Orders placed with labs and CT brain  No acute findings  Consulted with neuro  No thrombolytic. Patient is not a candidate for thrombolectomy. Recommenced MRI. OBS  Spoke with patient and female visitor.  Also contacted Dr. Cooper - patient's daughter who is a cardiologist: [phone]  Discussed care.   She recommended an echo to check the patient's valve replacement.  I did tell dr. Mei this. He agreed.  Patient admitted in stable and improved condition     Problems Addressed:  Cerebrovascular accident (CVA), unspecified mechanism (CMS/HCC): acute illness or injury  Right hand weakness: undiagnosed new problem with uncertain prognosis    Amount and/or Complexity of Data Reviewed  Labs: ordered.  Decision-making details documented in ED Course.     Details: elevated trop. uncertain etiology. renal function baseline.  Radiology: ordered and independent interpretation performed. Decision-making details documented in ED Course.     Details: no acute  ECG/medicine tests: ordered and independent interpretation performed. Decision-making details documented in ED Course.    Risk  Decision regarding hospitalization.        Procedure  ECG 12 lead    Performed by: Sofi Gomez PA-C  Authorized by: Sofi Gomez PA-C    ECG reviewed by ED Physician in the absence of a cardiologist: yes    Previous ECG:     Previous ECG:  Unavailable  Interpretation:     Interpretation: non-specific      Details:  Paced  Rate:     ECG rate:  71  Rhythm:     Rhythm: paced         Sofi Gomez PA-C  11/05/23 7045

## 2023-11-05 NOTE — H&P
History Of Present Illness  Og Cooper is a 78 y.o. male presenting with right hand weakness noticed yesterday and felt in particular today while having lunch trying to hold knife and started this morning around 830.  Patient denies any focal diplopia or slurred speech or dysphagia or bloody BM or dizziness or nausea vomiting or bleeding from anywhere.  No headache or paralysis or paresthesias or tingling or numbness in any other part of the body.  Denies any sensory proprioception deficits.  Denies any facial droop.  No chest pain or shortness of breath or palpitations or leg swelling.  No URI symptoms.  No sweating or nervousness or tremors.  Was not a candidate for tPA.  Admitted to rule out stroke versus TIA no other complaints.  EKG paced rhythm  Past Medical History  Past Medical History:   Diagnosis Date    Hypertension     Personal history of other diseases of the circulatory system     History of congestive heart failure   History of atrial fibrillation and pacemaker  History of TAVR      Surgical History  Past Surgical History:   Procedure Laterality Date    CARDIAC SURGERY      OTHER SURGICAL HISTORY  11/21/2019    Nose surgery    OTHER SURGICAL HISTORY  11/21/2019    Heart surgery        Social History  He reports that he quit smoking about 34 years ago. His smoking use included cigarettes. He has never used smokeless tobacco. He reports that he does not currently use alcohol. He reports that he does not use drugs.    Family History  Heart disease     Allergies  Penicillins and House dust    Review of Systems  All other 12 point review of systems negative except HPI  Physical Exam   General Appearance: AAO x 3, not in acute distress  Skin: skin color pink, warm, and dry; no suspicious rashes or lesions  Eyes : PERRL, EOM's intact  ENT: mucous membranes pink and moist  Neck: normocephalic  Respiratory: lungs clear to auscultation anteriorly; no wheezing, rhonchi, or crackles.   Heart: regular rate and  "rhythm. telemetry shows sinus rhythm  Abdomen: Nondistended, positive bowel sounds x4, soft,  nontender  Extremities: no edema   Peripheral pulses: normal x4 extremities  Neuro: alert, coherent and conversant, no focal motor deficits  Last Recorded Vitals  Blood pressure 149/84, pulse 68, temperature 36.7 °C (98.1 °F), resp. rate 16, height 1.702 m (5' 7\"), weight 96.2 kg (212 lb), SpO2 95 %.    Relevant Results  Scheduled medications  [START ON 11/6/2023] aspirin, 81 mg, oral, Daily  atorvastatin, 40 mg, oral, Nightly  [START ON 11/6/2023] pantoprazole, 20 mg, oral, Daily before breakfast  traZODone, 50 mg, oral, Nightly      Continuous medications  sodium chloride 0.9%, 75 mL/hr      PRN medications  XR chest 1 view    Result Date: 11/5/2023  STUDY: Chest Radiograph;  11/05/2023 at 12:00 PM INDICATION: Weakness. COMPARISON: None Available ACCESSION NUMBER(S): [id number] ORDERING CLINICIAN: LILO WILDE TECHNIQUE:  Frontal chest was obtained at 12:00 hours. FINDINGS: CARDIOMEDIASTINAL SILHOUETTE: The cardiomediastinal silhouette is enlarged.  Cardiac pacer leads are noted within the right heart.  No evidence of discontinuity or kink. A left atrial appendage clip is also visualized..  LUNGS: The lungs demonstrate no focal pulmonary consolidation or pleural effusions..  ABDOMEN: No remarkable upper abdominal findings.  BONES: No acute osseous changes.    1.  Cardiomegaly. 2.  No focal pulmonary consolidation or pleural effusions. Signed by Mahesh Murillo MD    CT head wo IV contrast    Result Date: 11/5/2023  Interpreted By:  Chidi Lai, STUDY: CT HEAD WO IV CONTRAST; 11/5/2023 11:57 am   INDICATION: Signs/Symptoms:weakness to right hand/ fine motor trouble.   COMPARISON: None.   ACCESSION NUMBER(S): [id number]   ORDERING CLINICIAN: LILO WILDE   TECHNIQUE: Contiguous axial CT images were obtained through the head at 2 mm slice thickness without contrast administration.   FINDINGS: INTRACRANIAL: The ventricles, " sulci and basal cisterns are within normal limits for size and configuration. The grey-white differentiation is intact. There is no mass effect or midline shift. There is no extraaxial fluid collection. There is no intracranial hemorrhage.  The calvarium is unremarkable.   EXTRACRANIAL: Visualized paranasal sinuses and mastoids are clear.       No evidence of acute cortical infarct or intracranial hemorrhage.   MACRO: None     Signed by: Chidi Lai 11/5/2023 12:00 PM Dictation workstation:   [id number]      Results for orders placed or performed during the hospital encounter of 11/05/23 (from the past 24 hour(s))   CBC and Auto Differential   Result Value Ref Range    WBC 8.0 4.4 - 11.3 x10*3/uL    nRBC 0.0 0.0 - 0.0 /100 WBCs    RBC 4.86 4.50 - 5.90 x10*6/uL    Hemoglobin 13.2 (L) 13.5 - 17.5 g/dL    Hematocrit 41.3 41.0 - 52.0 %    MCV 85 80 - 100 fL    MCH 27.2 26.0 - 34.0 pg    MCHC 32.0 32.0 - 36.0 g/dL    RDW 14.7 (H) 11.5 - 14.5 %    Platelets 161 150 - 450 x10*3/uL    Neutrophils % 66.9 40.0 - 80.0 %    Immature Granulocytes %, Automated 0.4 0.0 - 0.9 %    Lymphocytes % 17.4 13.0 - 44.0 %    Monocytes % 8.3 2.0 - 10.0 %    Eosinophils % 6.6 0.0 - 6.0 %    Basophils % 0.4 0.0 - 2.0 %    Neutrophils Absolute 5.36 1.60 - 5.50 x10*3/uL    Immature Granulocytes Absolute, Automated 0.03 0.00 - 0.50 x10*3/uL    Lymphocytes Absolute 1.39 0.80 - 3.00 x10*3/uL    Monocytes Absolute 0.66 0.05 - 0.80 x10*3/uL    Eosinophils Absolute 0.53 (H) 0.00 - 0.40 x10*3/uL    Basophils Absolute 0.03 0.00 - 0.10 x10*3/uL   Comprehensive metabolic panel   Result Value Ref Range    Glucose 117 (H) 74 - 99 mg/dL    Sodium 138 136 - 145 mmol/L    Potassium 4.4 3.5 - 5.3 mmol/L    Chloride 107 98 - 107 mmol/L    Bicarbonate 26 21 - 32 mmol/L    Anion Gap 9 (L) 10 - 20 mmol/L    Urea Nitrogen 28 (H) 6 - 23 mg/dL    Creatinine 1.39 (H) 0.50 - 1.30 mg/dL    eGFR 53 (L) >60 mL/min/1.73m*2    Calcium 9.0 8.6 - 10.3 mg/dL    Albumin 4.2  3.4 - 5.0 g/dL    Alkaline Phosphatase 93 33 - 136 U/L    Total Protein 6.7 6.4 - 8.2 g/dL    AST 25 9 - 39 U/L    Bilirubin, Total 0.8 0.0 - 1.2 mg/dL    ALT 23 10 - 52 U/L   aPTT   Result Value Ref Range    aPTT 39 (H) 27 - 38 seconds   Protime-INR   Result Value Ref Range    Protime 19.2 (H) 9.8 - 12.8 seconds    INR 1.7 (H) 0.9 - 1.1   Troponin I, High Sensitivity   Result Value Ref Range    Troponin I, High Sensitivity 31 (H) 0 - 20 ng/L   Urinalysis with Reflex Microscopic and Culture   Result Value Ref Range    Color, Urine Straw Straw, Yellow    Appearance, Urine Clear Clear    Specific Gravity, Urine 1.011 1.005 - 1.035    pH, Urine 5.0 5.0, 5.5, 6.0, 6.5, 7.0, 7.5, 8.0    Protein, Urine NEGATIVE NEGATIVE mg/dL    Glucose, Urine NEGATIVE NEGATIVE mg/dL    Blood, Urine NEGATIVE NEGATIVE    Ketones, Urine NEGATIVE NEGATIVE mg/dL    Bilirubin, Urine NEGATIVE NEGATIVE    Urobilinogen, Urine <2.0 <2.0 mg/dL    Nitrite, Urine NEGATIVE NEGATIVE    Leukocyte Esterase, Urine NEGATIVE NEGATIVE   Extra Urine Gray Tube   Result Value Ref Range    Extra Tube Hold for add-ons.          Assessment/Plan   Principal Problem:    TIA (transient ischemic attack)    78-year-old male with history of hypertension, A-fib on Eliquis, congenital aortic valve disease s/p TAVR, possible CVA, CHF, BPH, COVID-19 presented with strokelike symptoms to evaluate TIA versus acute CVA  Elevated creatinine questionable JOSÉ MANUEL    Plan  On stroke pathway  Cardiopulmonary monitoring  Follow vitals  On aspirin and statins  Hold Eliquis for now  Follow MRI brain, echocardiogram  Carotid ultrasound  Fall, aspiration, seizure precautions  Neurochecks  Permissive hypertension  Lipid panel, hemoglobin A1c  PT OT SLP  Watch volume status  Not in CHF currently  Resume home medicines as appropriate  Daily CBC BMP  Advised to follow cardiology and neurology as outpatient  Bilateral SCDs for DVT prophylaxis  Further management as clinical course  evolves  Continue current medicines  Troponin leak likely nonischemic myocardial injury  Hydrate carefully  Watch urine output and BMP, check BNP  Avoid nephrotoxic medicines  PPI for GI prophylaxis  Avoid polypharmacy  On trazodone for depression           Mukul Mei MD

## 2023-11-06 ENCOUNTER — APPOINTMENT (OUTPATIENT)
Dept: CARDIOLOGY | Facility: HOSPITAL | Age: 78
End: 2023-11-06
Payer: MEDICARE

## 2023-11-06 ENCOUNTER — APPOINTMENT (OUTPATIENT)
Dept: RADIOLOGY | Facility: HOSPITAL | Age: 78
End: 2023-11-06
Payer: MEDICARE

## 2023-11-06 ENCOUNTER — APPOINTMENT (OUTPATIENT)
Dept: VASCULAR MEDICINE | Facility: HOSPITAL | Age: 78
End: 2023-11-06
Payer: MEDICARE

## 2023-11-06 LAB
AORTIC VALVE MEAN GRADIENT: 7
AORTIC VALVE PEAK VELOCITY: 1.9
ATRIAL RATE: 71 BPM
AV PEAK GRADIENT: 14.4
AVA (PEAK VEL): 1.46
AVA (VTI): 1.53
CHOLEST SERPL-MCNC: 130 MG/DL (ref 0–199)
CHOLESTEROL/HDL RATIO: 4.3
EJECTION FRACTION APICAL 4 CHAMBER: 61.7
EJECTION FRACTION: 46
EST. AVERAGE GLUCOSE BLD GHB EST-MCNC: 120 MG/DL
HBA1C MFR BLD: 5.8 %
HDLC SERPL-MCNC: 30 MG/DL
LDLC SERPL CALC-MCNC: 75 MG/DL
LEFT ATRIUM VOLUME AREA LENGTH INDEX BSA: 35.4
LEFT VENTRICLE INTERNAL DIMENSION DIASTOLE: 4.21 (ref 3.5–6)
LEFT VENTRICULAR OUTFLOW TRACT DIAMETER: 1.8
MITRAL VALVE E/A RATIO: 1.57
MITRAL VALVE E/E' RATIO: 19.7
NON HDL CHOLESTEROL: 100 MG/DL (ref 0–149)
P AXIS: 32 DEGREES
P OFFSET: 177 MS
P ONSET: 112 MS
PR INTERVAL: 162 MS
Q ONSET: 193 MS
QRS COUNT: 12 BEATS
QRS DURATION: 154 MS
QT INTERVAL: 452 MS
QTC CALCULATION(BAZETT): 491 MS
QTC FREDERICIA: 478 MS
R AXIS: 71 DEGREES
RIGHT VENTRICLE PEAK SYSTOLIC PRESSURE: 43.2
T AXIS: -77 DEGREES
T OFFSET: 419 MS
TRICUSPID ANNULAR PLANE SYSTOLIC EXCURSION: 2.3
TRIGL SERPL-MCNC: 125 MG/DL (ref 0–149)
VENTRICULAR RATE: 71 BPM
VLDL: 25 MG/DL (ref 0–40)

## 2023-11-06 PROCEDURE — 93880 EXTRACRANIAL BILAT STUDY: CPT | Performed by: STUDENT IN AN ORGANIZED HEALTH CARE EDUCATION/TRAINING PROGRAM

## 2023-11-06 PROCEDURE — 97165 OT EVAL LOW COMPLEX 30 MIN: CPT | Mod: GO

## 2023-11-06 PROCEDURE — 70450 CT HEAD/BRAIN W/O DYE: CPT | Performed by: RADIOLOGY

## 2023-11-06 PROCEDURE — 99233 SBSQ HOSP IP/OBS HIGH 50: CPT | Performed by: HOSPITALIST

## 2023-11-06 PROCEDURE — 70450 CT HEAD/BRAIN W/O DYE: CPT

## 2023-11-06 PROCEDURE — 99222 1ST HOSP IP/OBS MODERATE 55: CPT | Performed by: STUDENT IN AN ORGANIZED HEALTH CARE EDUCATION/TRAINING PROGRAM

## 2023-11-06 PROCEDURE — 94660 CPAP INITIATION&MGMT: CPT

## 2023-11-06 PROCEDURE — 2500000001 HC RX 250 WO HCPCS SELF ADMINISTERED DRUGS (ALT 637 FOR MEDICARE OP): Performed by: HOSPITALIST

## 2023-11-06 PROCEDURE — 97161 PT EVAL LOW COMPLEX 20 MIN: CPT | Mod: GP

## 2023-11-06 PROCEDURE — G0378 HOSPITAL OBSERVATION PER HR: HCPCS

## 2023-11-06 PROCEDURE — 92610 EVALUATE SWALLOWING FUNCTION: CPT | Mod: GN

## 2023-11-06 PROCEDURE — 83036 HEMOGLOBIN GLYCOSYLATED A1C: CPT | Performed by: HOSPITALIST

## 2023-11-06 PROCEDURE — 80061 LIPID PANEL: CPT | Performed by: HOSPITALIST

## 2023-11-06 PROCEDURE — 93306 TTE W/DOPPLER COMPLETE: CPT

## 2023-11-06 PROCEDURE — 2500000004 HC RX 250 GENERAL PHARMACY W/ HCPCS (ALT 636 FOR OP/ED): Performed by: HOSPITALIST

## 2023-11-06 PROCEDURE — 93306 TTE W/DOPPLER COMPLETE: CPT | Performed by: STUDENT IN AN ORGANIZED HEALTH CARE EDUCATION/TRAINING PROGRAM

## 2023-11-06 PROCEDURE — 36415 COLL VENOUS BLD VENIPUNCTURE: CPT | Performed by: HOSPITALIST

## 2023-11-06 PROCEDURE — 2500000001 HC RX 250 WO HCPCS SELF ADMINISTERED DRUGS (ALT 637 FOR MEDICARE OP): Performed by: INTERNAL MEDICINE

## 2023-11-06 PROCEDURE — 72040 X-RAY EXAM NECK SPINE 2-3 VW: CPT | Mod: FY

## 2023-11-06 PROCEDURE — 93880 EXTRACRANIAL BILAT STUDY: CPT

## 2023-11-06 PROCEDURE — 72040 X-RAY EXAM NECK SPINE 2-3 VW: CPT | Performed by: RADIOLOGY

## 2023-11-06 RX ORDER — BUPROPION HYDROCHLORIDE 300 MG/1
300 TABLET ORAL DAILY
Status: DISCONTINUED | OUTPATIENT
Start: 2023-11-07 | End: 2023-11-07 | Stop reason: HOSPADM

## 2023-11-06 RX ORDER — METOPROLOL SUCCINATE 25 MG/1
12.5 TABLET, EXTENDED RELEASE ORAL DAILY
Status: DISCONTINUED | OUTPATIENT
Start: 2023-11-06 | End: 2023-11-07 | Stop reason: HOSPADM

## 2023-11-06 RX ORDER — LISINOPRIL 10 MG/1
10 TABLET ORAL DAILY
Status: DISCONTINUED | OUTPATIENT
Start: 2023-11-07 | End: 2023-11-07 | Stop reason: HOSPADM

## 2023-11-06 RX ORDER — HYDRALAZINE HYDROCHLORIDE 20 MG/ML
10 INJECTION INTRAMUSCULAR; INTRAVENOUS EVERY 6 HOURS PRN
Status: DISCONTINUED | OUTPATIENT
Start: 2023-11-06 | End: 2023-11-07 | Stop reason: HOSPADM

## 2023-11-06 RX ORDER — AMLODIPINE BESYLATE 5 MG/1
5 TABLET ORAL DAILY
Status: DISCONTINUED | OUTPATIENT
Start: 2023-11-06 | End: 2023-11-07 | Stop reason: HOSPADM

## 2023-11-06 RX ORDER — METHYLPHENIDATE HYDROCHLORIDE 5 MG/1
5 TABLET ORAL DAILY
Status: DISCONTINUED | OUTPATIENT
Start: 2023-11-07 | End: 2023-11-07 | Stop reason: HOSPADM

## 2023-11-06 RX ADMIN — ASPIRIN 81 MG: 81 TABLET, COATED ORAL at 09:13

## 2023-11-06 RX ADMIN — ATORVASTATIN CALCIUM 40 MG: 40 TABLET, FILM COATED ORAL at 21:41

## 2023-11-06 RX ADMIN — PERFLUTREN 2 ML OF DILUTION: 6.52 INJECTION, SUSPENSION INTRAVENOUS at 06:30

## 2023-11-06 RX ADMIN — APIXABAN 5 MG: 5 TABLET, FILM COATED ORAL at 21:41

## 2023-11-06 RX ADMIN — PANTOPRAZOLE SODIUM 20 MG: 20 TABLET, DELAYED RELEASE ORAL at 06:43

## 2023-11-06 RX ADMIN — AMLODIPINE BESYLATE 5 MG: 5 TABLET ORAL at 21:41

## 2023-11-06 RX ADMIN — METOPROLOL SUCCINATE 12.5 MG: 25 TABLET, EXTENDED RELEASE ORAL at 21:40

## 2023-11-06 ASSESSMENT — COGNITIVE AND FUNCTIONAL STATUS - GENERAL
MOBILITY SCORE: 24
MOBILITY SCORE: 23
HELP NEEDED FOR BATHING: A LITTLE
TURNING FROM BACK TO SIDE WHILE IN FLAT BAD: A LITTLE
DAILY ACTIVITIY SCORE: 24
DAILY ACTIVITIY SCORE: 23

## 2023-11-06 ASSESSMENT — ACTIVITIES OF DAILY LIVING (ADL)
ADL_ASSISTANCE: INDEPENDENT
BATHING_ASSISTANCE: STAND BY

## 2023-11-06 ASSESSMENT — PAIN - FUNCTIONAL ASSESSMENT
PAIN_FUNCTIONAL_ASSESSMENT: 0-10

## 2023-11-06 ASSESSMENT — PAIN SCALES - GENERAL
PAINLEVEL_OUTOF10: 0 - NO PAIN

## 2023-11-06 NOTE — ED NOTES
11/6/2023 @ 1540   Awake, Alert, Oriented x4   Sitting up in recliner  Speech clear  Smile Symmetrical, tongue midline  Moving all extremities x4   Hand grasps strong/= bilat.  Follows commands   Pt having difficulty with visual field testing affecting bilat eyes ~ states unable to see how many fingers I am holding up peripherally  NIHSS = 1 @ present for vision field test.  Denies any neurological symptoms @ present, denies numbness or tingling to hands  Personal Stroke Risk Factors reviewed  with patient  #1:  HTN  #2:  High Cholesterol  #3:  A-Fib  #4:  SUNSHINE, nightly use of C-pap  #5:  Obese  #6:  Former Smoker, quit in the late 80's   Pt is compliant with daily medications   Sees PCP regularly  Walks approx 2 miles 3 days a week  Patient lives @ [facility] in [facility] Living in an apartment alone, wife lives in the [facility] in the same facility  Denies use of walker or cane, denies recent falls  Daughters x2 are patient's POA  Reviewed the meaning of BE FAST and how to recognize signs/symptoms of Stroke/TIA, booklet & magnet reviewed and provided to patient  Reviewed the importance of calling 911 with Stroke/TIA symptoms  Provided patient [facility] Stroke Program info    Pt currently unable to have MRI/A testing due to Pacemaker  Inpatient Provider plan to repeat 24hr CT scan brain imaging    Reviewed patient care plan with Primary Nurse, Christine JOHANSEN  Denies questions or concerns regarding Stroke Core Measures  Stroke/TIA education documented by this nurse   LAW Hong, RN  Stroke Center Coordinator, [facility]         Carleen Hong RN  11/06/23 [id number]       Carleen Hong RN  11/06/23 1705

## 2023-11-06 NOTE — PROGRESS NOTES
11/06/23 1547   Discharge Planning   Living Arrangements Alone   Support Systems Children;Scientology/batsheva community;Other (Comment)   Assistance Needed Independent   Type of Residence Private residence   Number of Stairs to Enter Residence 0   Number of Stairs Within Residence 0   Do you have animals or pets at home? No   Who is requesting discharge planning? Provider   Home or Post Acute Services None   Patient expects to be discharged to: Home   Does the patient need discharge transport arranged? No     SW reviewed chart and met with pt to complete the assessment. He is alert and oriented, pleasant and cooperative and he was educated to the SW roles in the discharge planning process. Pt resides in independent living at [facility]. He reports being independent with all care/activities, excluding driving. He relies on family or the public transit for transportation needs. He does not use any DME or receive any home care services. Pt is active with PCP, Dr. Mart, and uses [facility] for prescriptions. Pt denies any issues obtaining/affording medications. He is planning to return to his apartment at discharge and has no concerns regarding his discharge plan. Care Transitions will continue to follow.

## 2023-11-06 NOTE — PROGRESS NOTES
Occupational Therapy    Evaluation    Patient Name: Og Cooper  MRN: [id number]  Today's Date: 11/6/2023  Time Calculation  Start Time: 1120  Stop Time: 1141  Time Calculation (min): 21 min        Assessment:  OT Assessment: Pt is 78 year old male admitted for stroke symptoms. Pt performing mobility with CGA/SBA. Pt up to bathroom IND in room. Pt with functional coordination deficits and questionable visual deficits. Pt would benefit from skilled OT services to address executive functioning skills and additional deficits.  Prognosis: Good  Evaluation/Treatment Tolerance: Patient tolerated treatment well  End of Session Communication: Bedside nurse  End of Session Patient Position: Up in chair  OT Assessment Results: Decreased fine motor control, Decreased gross motor control, Decreased IADLs, Decreased cognition  Prognosis: Good  Evaluation/Treatment Tolerance: Patient tolerated treatment well  Plan:  Treatment Interventions: ADL retraining, Cognitive reorientation, UE strengthening/ROM, Neuromuscular reeducation  OT Frequency: 2 times per week  OT Discharge Recommendations: Low intensity level of continued care  OT Recommended Transfer Status: Stand by assist  Treatment Interventions: ADL retraining, Cognitive reorientation, UE strengthening/ROM, Neuromuscular reeducation    Subjective   Current Problem:  1. Cerebrovascular accident (CVA), unspecified mechanism (CMS/HCC)        2. Right hand weakness        3. TIA (transient ischemic attack)  Transthoracic echo (TTE) complete    Vascular US carotid artery duplex bilateral    Vascular US carotid artery duplex bilateral    Transthoracic echo (TTE) complete      4. Other cerebral infarction (CMS/HCC)  Vascular US carotid artery duplex bilateral    Vascular US carotid artery duplex bilateral      5. Other transient cerebral ischemic attacks and related syndromes  Transthoracic echo (TTE) complete      6. Other specified symptoms and signs involving the circulatory  and respiratory systems  Vascular US carotid artery duplex bilateral        General:  General  Reason for Referral: Stroke symptoms including R hand weakness  Referred By: Sigifredo  Past Medical History Relevant to Rehab: Afib. s/p pacemaker and TAVR, CHF, BPH, Covid  Family/Caregiver Present: No  Co-Treatment: PT  Co-Treatment Reason: coeval to optimize mobility and safety while focusing on discipline specific goals  Prior to Session Communication: Bedside nurse  Patient Position Received: Bed, 3 rail up  General Comment: Pt observed supine with HOB elevated. Pt pleasant and copperative with therapy.  Precautions:  Medical Precautions: Fall precautions  Vital Signs:     Pain:  Pain Assessment  Pain Assessment: 0-10  Pain Score: 0 - No pain    Objective   Cognition:  Overall Cognitive Status: Within Functional Limits  Orientation Level: Oriented X4  Cognition Comments: Pt reported fogginess and slow provessing over last few months, typically associating with older age  Cognition Test Scores  Cognition Tests: Cognition Test Performed  SBT Test Score: 4 (Pt with 2 errors when recalling initial prompt, no other errors in assessment. Score interpreted as normal cognition.)        Home Living:  Type of Home: Independent living  Lives With: Alone (Spouse lives in nursing facility)  Home Layout: One level  Home Access: No concerns  Bathroom Shower/Tub: Walk-in shower  Bathroom Toilet: Handicapped height  Bathroom Equipment: Grab bars in shower, Shower chair with back, Hand-held shower hose, Grab bars around toilet  Prior Function:  Level of [place]: Independent with ADLs and functional transfers, Independent with homemaking with ambulation  ADL Assistance: Independent  Homemaking Assistance: Independent  Ambulatory Assistance: Independent  Hand Dominance: Left  Prior Function Comments: (-) falls  IADL History:  Leisure and Hobbies: enjoys singing and playing [employer]  IADL Comments: (-) drive; able to perform household  tasks including cooking and cleaning, utilizes dining services at [facility]. Volunteers at [facility]. Pt reports active with .  ADL:  Eating Assistance: Independent  Grooming Assistance: Independent  Grooming Deficit:  (Pt up to bathroom IND)  Bathing Assistance: Stand by  UE Dressing Assistance: Stand by  LE Dressing Assistance: Stand by  Toileting Assistance with Device: Stand by  Functional Assistance: Stand by  Activity Tolerance:  Endurance: Endurance does not limit participation in activity  Activity Tolerance Comments: sit>stand from EOB. Functional mobility with no device within hospital room requirin SBA. Mild RLE heaviness in steps observed. No LOB.  Bed Mobility/Transfers: Bed Mobility  Bed Mobility: Yes  Bed Mobility 1  Bed Mobility 1: Supine to sitting  Level of Assistance 1: Contact guard  Bed Mobility Comments 1: HOB elevated    Transfers  Transfer: Yes  Transfer 1  Transfer From 1: Sit to, Stand to  Transfer to 1: Sit, Stand  Technique 1: Sit to stand, Stand to sit  Transfer Level of Assistance 1: Close supervision (SBA)    Sitting Balance:  Static Sitting Balance  Static Sitting-Balance Support: No upper extremity supported  Static Sitting-Level of Assistance: Close supervision  Dynamic Sitting Balance  Dynamic Sitting-Balance Support: No upper extremity supported  Dynamic Sitting-Balance:  (CGA)  Standing Balance:  Static Standing Balance  Static Standing-Balance Support: No upper extremity supported  Static Standing-Level of Assistance: Close supervision (SBA)  Dynamic Standing Balance  Dynamic Standing-Balance Support: No upper extremity supported  Dynamic Standing-Balance:  (SBA)   Modalities:     Vision:Vision - Basic Assessment  Current Vision: Wears glasses only for reading (Pt reports questionable blurred vision, last eye doctor visit ~ 1 year ago)   and Vision - Complex Assessment  Vision Comments: Vision assessment, difficulty maintaining head in neutral position when performing  trackingand visual cut assessment in L visual field. Questionable visual deficits, may require additional assessment. Pt denies dizziness or headache with assessment.  Sensation:  Sensation Comment: Pt denies numbness or tingling  Strength:  Strength Comments: BUE WFL ROM; BUE grossly 4-/5  Perception:  Inattention/Neglect:  (No observed neglect when performing functional tasks or mobility)  Coordination:  Finger to Nose: Intact (Intact with eyes occluded)  Coordination Comment: Mild impaired coordination for functional grasping; Decreased coordination to perform opposition with R hand when attempting to perform bilaterally   Hand Function:  Gross Grasp: Functional  Coordination: Functional      Outcome Measures:[facility] Daily Activity  Putting on and taking off regular lower body clothing: None  Bathing (including washing, rinsing, drying): A little  Putting on and taking off regular upper body clothing: None  Toileting, which includes using toilet, bedpan or urinal: None  Taking care of personal grooming such as brushing teeth: None  Eating Meals: None  Daily Activity - Total Score: 23        Education Documentation  ADL Training, taught by Shannon Johns OT at 11/6/2023  1:26 PM.  Learner: Patient  Readiness: Acceptance  Method: Explanation  Response: Verbalizes Understanding    Education Comments  No comments found.        OP EDUCATION:       Goals:  Encounter Problems       Encounter Problems (Active)       ADLs          BALANCE       Pt will maintain dynamic standing balance during ADL task with modified independent level of assistance in order to demonstrate decreased risk of falling and improved postural control. (Progressing)       Start:  11/06/23    Expected End:  11/20/23               COGNITION/SAFETY       Patient will follow 100% Complex and Multistep commands to allow improved ADL performance. (Progressing)       Start:  11/06/23    Expected End:  11/20/23               VISION       Patient will scan  a visually distracting environment to locate materials needed for with  modified independent level of assistance. (Progressing)       Start:  11/06/23    Expected End:  11/20/23

## 2023-11-06 NOTE — NURSING NOTE
I spoke with the patient's daughter, Ammy Tobias, per request of HUMZA España. Ammy had a few questions re: her father's stay with us.  She was complimentary of the care he was receiving from the team, but had a few questions re: the status of what was happening with her dad. She had spoken to Dr Mei and knew that he was going to order a second CT for comparison since her dad was not able to have MRI due to his pacemaker. She wanted to know if the CT would be done today and if he would be started back on his Eliquis if everything was ok on the CT.  I spoke with Dr. Mei, he stated the CT would be done today and if there were not any contraindications from the scan he would restart her father on Eliquis. I relayed this information to Ammy, who stated understanding and appreciation.     Dr. Mei also stated he had left a message for Ammy's sister, and was awaiting her call back. Ammy was notified of this information.

## 2023-11-06 NOTE — CONSULTS
Inpatient consult to Cardiology  Consult performed by: Sriram Noriega MD  Consult ordered by: Mukul Mei MD  Reason for consult: TIA      History Of Present Illness:    Mr. Og Cooper is a 78 y.o. former smoker male being consulted by the Cardiology team for TIA. Patient with past medical history significant for hypertension, hyperlipidemia, morbid obesity, HFrEF (LVEF 40-45%), Atrial Fibrillation on Eliquis, S/P PPM,  S/P SAVR, S/P TAVR. Patient admitted to ED [facility] on 11/05/2023 complaining of weakness in R hand. He states that while having lunch and trying to hold a knife, he felt weakness in his fingers. Notably, at the hospital, his strength was already normal. He denies diplopia, slurred speech, dysphagia, other body weakness, dizziness, nausea, vomiting, numbness, chest pain, shortness of breath, palpitations, bleeding or syncope. Admitted to clinical assessment.   The EKG today shows ventricular paced rhythm. , Troponin 31.  Echo (11/06/2023):  1. Left ventricular systolic function is mildly decreased with a 40-45% estimated ejection fraction.   2. There is global hypokinesis of the left ventricle with minor regional variations.   3. Abnormal septal motion consistent with RV pacemaker.   4. Mild tricuspid regurgitation is visualized.   5. Hemodynamics are consistent with normal functioning of the bioprosthetic TAVR valve. Leaflets of the TAVR valve are not well visualized.   6. No prior echocardiograms available for comparison.   7. Poorly visualized anatomical structures due to suboptimal image quality.  US duplex carotids:  Right Carotid: Findings are consistent with less than 50% stenosis of the right proximal internal carotid artery. Laminar flow seen by color Doppler. Right external carotid artery appears patent with no evidence of stenosis. The right vertebral artery is patent with antegrade flow. No evidence of hemodynamically significant stenosis in the right  subclavian artery.  Left Carotid: Findings are consistent with less than 50% stenosis of the left proximal internal carotid artery. Left external carotid artery appears patent with no evidence of stenosis. The left vertebral artery is patent with antegrade flow. No evidence of hemodynamically significant stenosis in the left subclavian artery.  Additional Findings:  Limited exam due to high bifurcation and tortuous vessels.     Last Recorded Vitals:  Vitals:    11/06/23 0800 11/06/23 1024 11/06/23 1111 11/06/23 1200   BP: 170/81 135/77  143/82   BP Location: Left arm      Patient Position: Sitting      Pulse: 66 67  65   Resp: 16 16  16   Temp: 36.7 °C (98 °F)      TempSrc: Oral      SpO2: 97% 99% 97% 98%   Weight:       Height:           Last Labs:  CBC - 11/5/2023: 12:04 PM  8.0 13.2 161    41.3      CMP - 11/5/2023: 12:04 PM  9.0 7.1 25 --- 0.6   _ 4.2 23 93      PTT - 11/5/2023: 12:04 PM  1.7   19.2 39     Troponin I, High Sensitivity   Date/Time Value Ref Range Status   11/05/2023 12:04 PM 31 (H) 0 - 20 ng/L Final     BNP   Date/Time Value Ref Range Status   11/05/2023 05:36  (H) 0 - 99 pg/mL Final     Hemoglobin A1C   Date/Time Value Ref Range Status   11/06/2023 05:01 AM 5.8 (H) see below % Final   12/22/2021 02:55 PM 5.9 (H) 4.3 - 5.6 % Final     Comment:     American Diabetes Association guidelines indicate that patients with HgbA1c in   the range 5.7-6.4% are at increased risk for development of diabetes, and   intervention by lifestyle modification may be beneficial. HgbA1c greater or   equal to 6.5% is considered diagnostic of diabetes.     LDL Calculated   Date/Time Value Ref Range Status   11/06/2023 05:01 AM 75 <=99 mg/dL Final     Comment:                                 Near   Borderline      AGE      Desirable  Optimal    High     High     Very High     0-19 Y     0 - 109     ---    110-129   >/= 130     ----    20-24 Y     0 - 119     ---    120-159   >/= 160     ----      >24 Y     0 -  99    100-129  130-159   160-189     >/=190     11/05/2023 05:36 PM 88 <=99 mg/dL Final     Comment:                                 Near   Borderline      AGE      Desirable  Optimal    High     High     Very High     0-19 Y     0 - 109     ---    110-129   >/= 130     ----    20-24 Y     0 - 119     ---    120-159   >/= 160     ----      >24 Y     0 -  99   100-129  130-159   160-189     >/=190       VLDL   Date/Time Value Ref Range Status   11/06/2023 05:01 AM 25 0 - 40 mg/dL Final   11/05/2023 05:36 PM 25 0 - 40 mg/dL Final   02/02/2023 09:30 AM 33 0 - 40 mg/dL Final      Last I/O:  I/O last 3 completed shifts:  In: 860 (8.9 mL/kg) [P.O.:360; IV Piggyback:500]  Out: 300 (3.1 mL/kg) [Urine:300 (0.1 mL/kg/hr)]  Weight: 96.2 kg     Past Cardiology Tests (Last 3 Years):  EKG:  ECG 12 lead (Wet Read) 11/5/2023    Echo:  Transthoracic echo (TTE) complete 11/6/2023    Past Medical History:  He has a past medical history of Hypertension and Personal history of other diseases of the circulatory system.    Past Surgical History:  He has a past surgical history that includes Other surgical history (11/21/2019); Other surgical history (11/21/2019); and Cardiac surgery.      Social History:  He reports that he quit smoking about 34 years ago. His smoking use included cigarettes. He has never used smokeless tobacco. He reports that he does not currently use alcohol. He reports that he does not use drugs.    Family History:  No family history on file.     Allergies:  Penicillins and House dust    Inpatient Medications:  Scheduled medications   Medication Dose Route Frequency    aspirin  81 mg oral Daily    atorvastatin  40 mg oral Nightly    pantoprazole  20 mg oral Daily before breakfast    traZODone  50 mg oral Nightly     PRN medications   Medication     Continuous Medications   Medication Dose Last Rate    sodium chloride 0.9%  75 mL/hr       Outpatient Medications:  Current Outpatient Medications   Medication Instructions     amLODIPine (Norvasc) 5 mg tablet TAKE 1 TABLET BY MOUTH EVERY DAY    aspirin 81 mg EC tablet 1 tablet, oral, Daily    atorvastatin (LIPITOR) 40 mg, oral, Nightly    buPROPion XL (Wellbutrin XL) 300 mg 24 hr tablet TAKE 1 TABLET BY MOUTH ONCE A DAY TAKE WITH 150MG TAB FOR 450MG    buPROPion XL (WELLBUTRIN XL) 150 mg, oral, Every morning    diphenhydrAMINE-acetaminophen (Tylenol PM)  mg per tablet 1 tablet, oral, Nightly PRN    Eliquis 5 mg, oral, 2 times daily    furosemide (Lasix) 20 mg tablet TAKE 1 TABLET BY MOUTH EVERY DAY AS NEEDED    lidocaine (Lidoderm) 5 % patch 1 patch, transdermal, Daily, Remove & discard patch within 12 hours or as directed by MD.    lisinopril 10 mg, oral, Daily, as directed    melatonin 10 mg tablet 1 tablet, oral, Nightly    methylphenidate (RITALIN) 5 mg, oral, Daily    metoprolol succinate XL (Toprol-XL) 25 mg 24 hr tablet TAKE 1/2 TABLET BY MOUTH EVERY DAY    omeprazole (PRILOSEC) 20 mg, oral, Daily    traZODone (Desyrel) 50 mg tablet TAKE 0.5 TO 1 TABLET BY MOUTH AT BEDTIME    vibegron (Gemtesa) 75 mg tablet 1 tablet, oral, Daily       Physical Exam:  General: alert, oriented and in no acute distress  HEENT: NC/AT; EOMI; PERRLA, external ear is normal  Neck: supple; trachea midline; no masses; no JVD  Chest: lungs clear to auscultation bilaterally; no wheezing  CVS: regular rhythm, S1S2 normal, no murmurs. Pacemaker.  Abdomen: Soft, non-tender, non-distension, no organomegaly  Extremities: no clubbing/cyanosis/edema  Neuro: Grossly intact     Psychiatric: Normal mood and affect     Assessment/Plan     Mr. Og Cooper is a 78 y.o. former smoker male being consulted by the Cardiology team for TIA. Patient with past medical history significant for hypertension, hyperlipidemia, morbid obesity, HFrEF (LVEF 40-45%), Atrial Fibrillation on Eliquis, S/P PPM,  S/P SAVR, S/P TAVR. Patient admitted to ED [facility] on 11/05/2023 complaining of weakness in R hand. He states that  "while having lunch and trying to hold a knife, he felt weakness in his fingers. Notably, at the hospital, his strength was already normal. He denies diplopia, slurred speech, dysphagia, other body weakness, dizziness, nausea, vomiting, numbness, chest pain, shortness of breath, palpitations, bleeding or syncope. Admitted to clinical assessment.     # Heart Failure with Reduced Ejection Fraction (LVEF 40-45%)  - Patient is asymptomatic from cardiac standpoint  - Looks \"warm and dry\"  - The EKG today shows ventricular paced rhythm. , Troponin 31.  - Echo (11/06/2023):  1. Left ventricular systolic function is mildly decreased with a 40-45% estimated ejection fraction.   2. There is global hypokinesis of the left ventricle with minor regional variations.   3. Abnormal septal motion consistent with RV pacemaker.   4. Mild tricuspid regurgitation is visualized.   5. Hemodynamics are consistent with normal functioning of the bioprosthetic TAVR valve. Leaflets of the TAVR valve are not well visualized.   6. No prior echocardiograms available for comparison.   7. Poorly visualized anatomical structures due to suboptimal image quality.  - Keep daily weights.   - Low sodium diet (2g).  - 1500mL fluid restriction.  - Strict I&Os.  - Electrolyte control and daily BMP including magnesium.  - Keep Lisinopril 20mg daily  - Keep Metoprolol succinate 12.5mg daily.  - Would consider to optimize Spironolactone and dapagliflozin as outpatient.   - Follow up in Cardiology clinic for medication optimization    # Atrial Fibrillation / S/P SAVR / S/P TAVR  - Elevated [id number]-[id number] score of 5 which implies higher risk for thromboembolic events yearly, therefore should continue on stroke prophylaxis with DOAC.  - Patient is currently asymptomatic on rate control strategy with Metoprolol succinate 12.5mg daily and Eliquis 5mg BID.     # Hypertension  - Controlled BP  - Keep Amlodipine 5mg daily, Lisinopril, Metoprolol    # " Hyperlipidemia  - Keep Atorvastatin 40mg daily    # TIA  - US duplex carotids:  Right Carotid: Findings are consistent with less than 50% stenosis of the right proximal internal carotid artery. Laminar flow seen by color Doppler. Right external carotid artery appears patent with no evidence of stenosis. The right vertebral artery is patent with antegrade flow. No evidence of hemodynamically significant stenosis in the right subclavian artery.  Left Carotid: Findings are consistent with less than 50% stenosis of the left proximal internal carotid artery. Left external carotid artery appears patent with no evidence of stenosis. The left vertebral artery is patent with antegrade flow. No evidence of hemodynamically significant stenosis in the left subclavian artery.  Additional Findings:  Limited exam due to high bifurcation and tortuous vessels.  - Neurology recs  - Would suggest to keep ASA, Eliquis, statin high intensity    Thank you for allowing me to participate in the care of this patient. Please reach me out if you have any questions or if you need any clarifications regarding the patient's care.      Peripheral IV 11/05/23 20 G Right Antecubital (Active)   Site Assessment Clean;Dry;Intact 11/06/23 0734   Dressing Status Clean;Dry 11/06/23 0734   Number of days: 1       Code Status:  Full Code    Sriram Noriega MD

## 2023-11-06 NOTE — PROGRESS NOTES
Speech-Language Pathology    Inpatient Speech-Language Pathology Clinical Swallow Evaluation    Patient Name: Og Cooper  MRN: [id number]  Today's Date: 11/6/2023   Time Calculation  Start Time: 0914  Stop Time: 0927  Time Calculation (min): 13 min         Current Problem:   1. Cerebrovascular accident (CVA), unspecified mechanism (CMS/HCC)        2. Right hand weakness        3. TIA (transient ischemic attack)  Transthoracic echo (TTE) complete    Vascular US carotid artery duplex bilateral    Vascular US carotid artery duplex bilateral    Transthoracic echo (TTE) complete      4. Other cerebral infarction (CMS/HCC)  Vascular US carotid artery duplex bilateral    Vascular US carotid artery duplex bilateral      5. Other transient cerebral ischemic attacks and related syndromes  Transthoracic echo (TTE) complete      6. Other specified symptoms and signs involving the circulatory and respiratory systems  Vascular US carotid artery duplex bilateral            Recommendations:  Risk for Aspiration: No  Solid Diet Recommendations : Regular (IDDSI Level 7)  Liquid Diet Recommendations: Thin (IDDSI Level 0)  Compensatory Swallowing Strategies: Decrease distractions during eating/feeding, Upright 90 degrees as possible for all oral intake, Remain upright for 20-30 minutes after meals, Alternate solids and liquids, Small bites/sips      Assessment:  Barriers to Discharge: None  Treatment Provided: No  Medical Staff Made Aware: Yes      Plan:  Inpatient/Swing Bed or Outpatient: Inpatient  SLP Plan: No skilled SLP  Diet Recommendations: Liquid, Solid  Solid Consistency: Regular (IDDSI Level 7)  Liquid Consistency: Thin (IDDSI Level 0)  Discussed POC: Patient, Nursing  Discussed Risks/Benefits: Yes, Patient, Nursing  Patient/Caregiver Agreeable: Yes  SLP - OK to Discharge: Yes      Subjective   Current Problem:  Pt is a 78 year old male receiving a SLP bedside swallowing evaluation because he is on the stroke pathway. He  was alert, oriented, and cooperated with all assessment tasks.     Pt reported he aspirated when drinking very rarely, typicallly when he is moving too quickly. His appetite is good, and he typically eats fast and takes big bites d/t his prior work as a  at a [employer]. His diet at home is regular solids and thin liquids. He does not have difficulty masticating any food textures.       General Visit Information:  Patient Class: Inpatient  Ordering Physician: Mukul Mei MD  Reason for Referral: Swallow Evaluation; Rule out aspiration  Past Medical History Relevant to Rehab: hypertension, congestive heart failure, A Fib w/ pacemaker, TAVR  Prior Level of Function: [facility]  Patient Seen During This Visit: Yes  Total Number of Visits : 1  Prior to Session Communication: Bedside nurse  Reviewed Procedures and Risks: Yes  Date of Onset: 11/05/23  Date of Order: 11/05/23  [employer] Diet: Regular; thin  Current Diet : regular;thin  Dysphagia Diagnosis: Within functional limits      Objective       Baseline Assessment:  Behavior/Cognition: Alert, Cooperative, Pleasant mood  Vision: Functional for self-feeding  Hearing: Within Functional Limits  Baseline Vocal Quality: Normal      Pain:  Pain Assessment: 0-10  Pain Score: 0 - No pain       Oral/Motor Assessment:  Oral Hygiene: [facility]  Dentition: Adequate/Natural  Oral Motor: Within Functional Limits  Facial Sensation: Within Functional Limits  Facial Symmetry: Within Functional Limits  Labial Agility: Within Functional Limits  Labial ROM: Within Functional Limits  Labial Strength: Within Functional Limits  Labial Symmetry: Within Functional Limits  Lingual Agility: Within Functional Limits  Lingual ROM: Within Functional Limits  Lingual Strength: Within Functional Limits  Lingual Symmetry: Within Functional Limits  Palatal Elevation: Within Functional Limits  Vocal Quality: Within Functional Limits  Intelligibility: Intelligible  Breath Support: Adequate for  speech  Hearing: Within Functional Limits      Consistencies Trialed:  Consistencies Trialed: Yes  Consistencies Trialed: Thin (IDDSI Level 0) - Cup, Pureed/extremely thick (IDDSI Level 4), Soft & bite sized/chopped (IDDSI Level 6), Regular (IDDSI Level 7)      Clinical Observations:  Management of Oral Secretions: Adequate  Oral Residue: Regular (IDDSI Level 7)      Inpatient:  Education Documentation  Modified Diet Training, taught by Alanna Queen, SLP at 11/6/2023 10:09 AM.  Learner: Patient  Readiness: Acceptance  Method: Explanation  Response: Verbalizes Understanding    Education Comments  No comments found.

## 2023-11-06 NOTE — PROGRESS NOTES
Physical Therapy    Physical Therapy Evaluation    Patient Name: Og Cooper  MRN: [id number]  Today's Date: 11/6/2023   Time Calculation  Start Time: 1119  Stop Time: 1140  Time Calculation (min): 21 min    Assessment/Plan   PT Assessment  PT Assessment Results: Decreased strength, Impaired balance, Decreased mobility, Decreased safety awareness  Assessment Comment: No acute PT needs. Pt could benefit from outpatient PT to improve balance and LE strength.  End of Session Patient Position: Alarm on, Up in chair  IP OR SWING BED PT PLAN  Inpatient or Swing Bed: Inpatient  PT Plan  PT Plan: PT Eval only  PT Eval Only Reason: No acute PT needs identified  PT Frequency: PT eval only  PT Discharge Recommendations:  (Outpatient PT)  PT Recommended Transfer Status: Stand by assist    Subjective     Current Problem:  Patient Active Problem List   Diagnosis    ADD (attention deficit disorder)    Anxiety    Atrial fibrillation (CMS/HCC)    Benign prostatic hyperplasia with urinary obstruction and other lower urinary tract symptoms    Class 2 severe obesity with body mass index (BMI) of 35 to 39.9 with serious comorbidity (CMS/HCC)    Depression    Erectile dysfunction    Essential hypertension    GERD (gastroesophageal reflux disease)    Chronic systolic congestive heart failure (CMS/HCC)    Aortic root dilatation (CMS/HCC)    Spondylosis of lumbar region without myelopathy or radiculopathy    COVID-19    TIA (transient ischemic attack)       General Visit Information:  General  Reason for Referral: impaired mobility  Referred By: Sigifredo  Past Medical History Relevant to Rehab: HTN, CHF, Afib, TAVR, PPM, BPH, Covid  Family/Caregiver Present: No  Co-Treatment: OT  Co-Treatment Reason: Maximize pt safety while assessing discipline specific needs  Prior to Session Communication: Bedside nurse  Patient Position Received: Bed, 2 rail up, Alarm off, not on at start of session  General Comment: To ED 11/5 with complaints of R hand  weakness    Home Living:  Home Living  Type of Home: Independent living (Wife in same facility with LTC.)  Lives With: Alone  Home Adaptive Equipment:  (walking stick)  Home Access: Stairs to enter with rails, Elevator (on 2nd floor)  Bathroom Shower/Tub: Walk-in shower  Bathroom Equipment: Shower chair with back, Grab bars in shower, Grab bars around toilet    Prior Level of Function:  Prior Function Per Pt/Caregiver Report  Level of [place]: Independent with ADLs and functional transfers, Independent with homemaking with ambulation  Vocational: Volunteer work  Leisure: Plays [employer]; sings  Prior Function Comments: Independent ambulator without assistive device. 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    Precautions:       Vital Signs:     Objective     Pain:  Pain Assessment  Pain Assessment: 0-10  Pain Score: 0 - No pain    Cognition:  Cognition  Overall Cognitive Status: Within Functional Limits  Orientation Level: Oriented X4    General Assessments:      Activity Tolerance  Endurance: Endurance does not limit participation in activity  Sensation  Light Touch: No apparent deficits  Strength  Strength Comments: RLE MMT 4+/5, L LE MMT 4+/5     Coordination  Heel to [place]: Bradykinesia  Postural Control  Postural Control: Within Functional Limits  Static Sitting Balance  Static Sitting-Balance Support: No upper extremity supported, Feet supported  Static Sitting-Level of Assistance: Distant supervision  Static Sitting-Comment/Number of Minutes: Sits EOB for ~5 mins, no LOB  Dynamic Sitting Balance  Dynamic Sitting-Balance Support: Bilateral upper extremity supported  Dynamic Sitting-Balance: Trunk control activities  Dynamic Sitting-Comments: Maintains balance with LE testing  Static Standing Balance  Static Standing-Balance Support: No upper extremity supported  Static Standing-Level of Assistance: Close supervision  Static Standing-Comment/Number of Minutes: Denies dizziness, no LOB  Dynamic Standing  Balance  Dynamic Standing-Balance Support: No upper extremity supported  Dynamic Standing-Balance: Turning  Dynamic Standing-Comments: Close supervision, no LOB    Functional Assessments:     Bed Mobility  Bed Mobility: Yes  Bed Mobility 1  Bed Mobility 1: Supine to sitting  Level of Assistance 1: Contact guard  Bed Mobility Comments 1: HOB elevated  Transfers  Transfer: Yes  Transfer 1  Transfer From 1: Bed to  Transfer to 1: Chair with arms  Technique 1: Sit to stand, Stand to sit  Transfer Level of Assistance 1:  (SBA)  Trials/Comments 1: Denies dizziness with positional changes, no LOB  Ambulation/Gait Training  Ambulation/Gait Training Performed: Yes  Ambulation/Gait Training 1  Surface 1: Level tile  Device 1: No device  Assistance 1:  (SBA)  Quality of Gait 1: Foot slap  Comments/Distance (ft) 1: Ambulates ~25' in room, slight foot slap of R LE, inconsistent step length however no LOB  Stairs  Stairs: No       Extremity/Trunk Assessments:        RLE   RLE :  (ROM WFL)  LLE   LLE :  (ROM WFL)    Outcome Measures:  [facility] Basic Mobility  Turning from your back to your side while in a flat bed without using bedrails: None  Moving from lying on your back to sitting on the side of a flat bed without using bedrails: A little  Moving to and from bed to chair (including a wheelchair): None  Standing up from a chair using your arms (e.g. wheelchair or bedside chair): None  To walk in hospital room: None  Climbing 3-5 steps with railing: None  Basic Mobility - Total Score: 23                            Goals:  Encounter Problems       Encounter Problems (Active)       Pain - Adult            Education Documentation  Mobility Training, taught by Naomi Carpenter PT at 11/6/2023  1:25 PM.  Learner: Patient  Readiness: Acceptance  Method: Explanation  Response: Verbalizes Understanding    Education Comments  No comments found.

## 2023-11-06 NOTE — ED NOTES
11/6/2023 @ 1540   Awake, Alert, Oriented x4   Sitting up in recliner  Speech clear  Smile Symmetrical, tongue midline  Moving all extremities x4   Hand grasps strong/= bilat.  Follows commands   Pt having difficulty with visual field testing affecting bilat eyes ~ states unable to see how many fingers I am holding up peripherally  NIHSS = 1 @ present for vision field test.  Denies any neurological symptoms @ present, denies numbness or tingling to hands  Personal Stroke Risk Factors reviewed  with patient  #1:  HTN  #2:  High Cholesterol  #3:  A-Fib  #4:  SUNSHINE, nightly use of C-pap  #5:  Obese  #6:  Former Smoker, quit in the late 80's   Pt is compliant with daily medications   Sees PCP regularly  Walks approx 2 miles 3 days a week  Patient lives @ [facility] in Independent Living in an apartment alone, wife lives in the [facility] in the same facility  Denies use of walker or cane, denies recent falls  Daughters x2 are patient's POA  Reviewed the meaning of BE FAST and how to recognize signs/symptoms of Stroke/TIA, booklet & magnet reviewed and provided to patient  Reviewed the importance of calling 911 with Stroke/TIA symptoms  Provided patient [facility] Stroke Program info    Pt currently unable to have MRI/A testing due to Pacemaker  Inpatient Provider plan to repeat 24hr CT scan brain imaging    Reviewed patient care plan with Primary Nurse, Christine JOHANSEN  Denies questions or concerns regarding Stroke Core Measures  Stroke/TIA education documented by this nurse   LAW Hong, RN  Stroke Center Coordinator, [facility]         Carleen Hong RN  11/06/23 8172

## 2023-11-07 VITALS
WEIGHT: 212 LBS | HEART RATE: 62 BPM | OXYGEN SATURATION: 95 % | TEMPERATURE: 96.5 F | SYSTOLIC BLOOD PRESSURE: 142 MMHG | BODY MASS INDEX: 33.27 KG/M2 | RESPIRATION RATE: 16 BRPM | DIASTOLIC BLOOD PRESSURE: 78 MMHG | HEIGHT: 67 IN

## 2023-11-07 PROBLEM — G45.9 TIA (TRANSIENT ISCHEMIC ATTACK): Status: RESOLVED | Noted: 2023-11-05 | Resolved: 2023-11-07

## 2023-11-07 LAB
ANION GAP SERPL CALC-SCNC: 9 MMOL/L (ref 10–20)
BUN SERPL-MCNC: 24 MG/DL (ref 6–23)
CALCIUM SERPL-MCNC: 9.4 MG/DL (ref 8.6–10.3)
CHLORIDE SERPL-SCNC: 105 MMOL/L (ref 98–107)
CO2 SERPL-SCNC: 27 MMOL/L (ref 21–32)
CREAT SERPL-MCNC: 1.25 MG/DL (ref 0.5–1.3)
GFR SERPL CREATININE-BSD FRML MDRD: 60 ML/MIN/1.73M*2
GLUCOSE SERPL-MCNC: 98 MG/DL (ref 74–99)
POTASSIUM SERPL-SCNC: 4.7 MMOL/L (ref 3.5–5.3)
SODIUM SERPL-SCNC: 136 MMOL/L (ref 136–145)

## 2023-11-07 PROCEDURE — 96360 HYDRATION IV INFUSION INIT: CPT

## 2023-11-07 PROCEDURE — 2500000004 HC RX 250 GENERAL PHARMACY W/ HCPCS (ALT 636 FOR OP/ED): Performed by: HOSPITALIST

## 2023-11-07 PROCEDURE — 2500000001 HC RX 250 WO HCPCS SELF ADMINISTERED DRUGS (ALT 637 FOR MEDICARE OP): Performed by: HOSPITALIST

## 2023-11-07 PROCEDURE — 36415 COLL VENOUS BLD VENIPUNCTURE: CPT | Performed by: HOSPITALIST

## 2023-11-07 PROCEDURE — 80048 BASIC METABOLIC PNL TOTAL CA: CPT | Performed by: HOSPITALIST

## 2023-11-07 PROCEDURE — G0378 HOSPITAL OBSERVATION PER HR: HCPCS

## 2023-11-07 PROCEDURE — 99222 1ST HOSP IP/OBS MODERATE 55: CPT | Performed by: STUDENT IN AN ORGANIZED HEALTH CARE EDUCATION/TRAINING PROGRAM

## 2023-11-07 PROCEDURE — 2500000001 HC RX 250 WO HCPCS SELF ADMINISTERED DRUGS (ALT 637 FOR MEDICARE OP): Performed by: INTERNAL MEDICINE

## 2023-11-07 PROCEDURE — 99239 HOSP IP/OBS DSCHRG MGMT >30: CPT | Performed by: HOSPITALIST

## 2023-11-07 RX ADMIN — AMLODIPINE BESYLATE 5 MG: 5 TABLET ORAL at 08:11

## 2023-11-07 RX ADMIN — BUPROPION HYDROCHLORIDE 300 MG: 300 TABLET, FILM COATED, EXTENDED RELEASE ORAL at 08:07

## 2023-11-07 RX ADMIN — APIXABAN 5 MG: 5 TABLET, FILM COATED ORAL at 08:07

## 2023-11-07 RX ADMIN — ASPIRIN 81 MG: 81 TABLET, COATED ORAL at 08:07

## 2023-11-07 RX ADMIN — METOPROLOL SUCCINATE 12.5 MG: 25 TABLET, EXTENDED RELEASE ORAL at 08:11

## 2023-11-07 RX ADMIN — PANTOPRAZOLE SODIUM 20 MG: 20 TABLET, DELAYED RELEASE ORAL at 08:07

## 2023-11-07 RX ADMIN — LISINOPRIL 10 MG: 10 TABLET ORAL at 08:11

## 2023-11-07 ASSESSMENT — COGNITIVE AND FUNCTIONAL STATUS - GENERAL
HELP NEEDED FOR BATHING: A LITTLE
MOBILITY SCORE: 23
DAILY ACTIVITIY SCORE: 23
TURNING FROM BACK TO SIDE WHILE IN FLAT BAD: A LITTLE

## 2023-11-07 ASSESSMENT — PAIN SCALES - GENERAL: PAINLEVEL_OUTOF10: 0 - NO PAIN

## 2023-11-07 ASSESSMENT — PAIN - FUNCTIONAL ASSESSMENT: PAIN_FUNCTIONAL_ASSESSMENT: 0-10

## 2023-11-07 NOTE — PROGRESS NOTES
Pt reviewed during Care Rounds and he is ready for discharge today. Dtr confirms the plan is to return home where pt lives in independent living at [facility]. ANDERSON provided to dtr per CM's request. No further needs identified.       JAMEL Rico

## 2023-11-07 NOTE — DISCHARGE SUMMARY
Discharge Diagnosis  TIA (transient ischemic attack)    Issues Requiring Follow-Up  Cardiology and neurology as well    Discharge Meds     Your medication list        CONTINUE taking these medications        Instructions Last Dose Given Next Dose Due   amLODIPine 5 mg tablet  Commonly known as: Norvasc      TAKE 1 TABLET BY MOUTH EVERY DAY       aspirin 81 mg EC tablet           atorvastatin 40 mg tablet  Commonly known as: Lipitor      Take 1 tablet (40 mg) by mouth once daily at bedtime.       buPROPion  mg 24 hr tablet  Commonly known as: Wellbutrin XL           buPROPion  mg 24 hr tablet  Commonly known as: Wellbutrin XL           diphenhydrAMINE-acetaminophen  mg per tablet  Commonly known as: Tylenol PM           Eliquis 5 mg tablet  Generic drug: apixaban           furosemide 20 mg tablet  Commonly known as: Lasix      TAKE 1 TABLET BY MOUTH EVERY DAY AS NEEDED       Gemtesa 75 mg tablet  Generic drug: vibegron           lidocaine 5 % patch  Commonly known as: Lidoderm      Place 1 patch over 12 hours on the skin once daily. Remove & discard patch within 12 hours or as directed by MD.       lisinopril 10 mg tablet      TAKE 1 TABLET BY MOUTH EVERY DAY AS DIRECTED       melatonin 10 mg tablet           methylphenidate 5 mg tablet  Commonly known as: Ritalin      Take 1 tablet (5 mg) by mouth once daily.       metoprolol succinate XL 25 mg 24 hr tablet  Commonly known as: Toprol-XL      TAKE 1/2 TABLET BY MOUTH EVERY DAY       omeprazole 20 mg DR capsule  Commonly known as: PriLOSEC      Take 1 capsule (20 mg) by mouth once daily.       traZODone 50 mg tablet  Commonly known as: Desyrel                    Test Results Pending At Discharge  Pending Labs       No current pending labs.            Hospital Course   78-year-old male with history of hypertension, A-fib on Eliquis, congenital aortic valve disease s/p TAVR, possible CVA, CHF, BPH, COVID-19 presented with strokelike symptoms to  evaluate TIA versus acute CVA  Elevated creatinine questionable JOSÉ MANUEL-now better   May have chronic kidney disease component also, initially held pressure medicines during lisinopril but BMP seems stable and hence can resume his home medicines can check BMP in 1 week time     Plan  Paced rhythm  On stroke pathway, CT head did not show acute process initially  Cardiopulmonary monitoring  Follow vitals stable   On aspirin and statins, d/w family updated including lipid panel and HgbA1c   echocardiogram reviewed EF 40 to 45%, not in volume overload, paced rhythm and appreciate cardiology recommendations  Repeat CT head was also inconclusive for any acute changes apparently and hence resume Eliquis, or lateral infarcts  Carotid ultrasound no significant stenosis  Fall, aspiration, seizure precautions  Neurochecks  Permissive hypertension initially then started  optimizing with medicines gradually so far stable pressure overall  PT OT SLP appreciated  Watch volume status stable  Not in CHF currently  Resumed home medicines as appropriate  Daily CBC BMP overall stable  Advised to follow cardiology and neurology as outpatient  Bilateral SCDs for DVT prophylaxis    Troponin leak likely nonischemic myocardial injury  Hydrated carefully  Discussed with family  Avoid nephrotoxic medicines  PPI for GI prophylaxis  Avoid polypharmacy  On trazodone for depression    Pertinent Physical Exam At Time of Discharge  Physical Exam  General Appearance: AAO x 3, not in acute distress  Skin: skin color pink, warm, and dry; no suspicious rashes or lesions  Eyes : PERRL, EOM's intact  ENT: mucous membranes pink and moist  Neck: normocephalic  Respiratory: lungs clear to auscultation anteriorly; no wheezing, rhonchi, or crackles.   Heart: regular rate and rhythm.   Abdomen: Nondistended, positive bowel sounds x4, soft,  nontender  Extremities: no edema   Peripheral pulses: normal x4 extremities  Neuro: alert, coherent and conversant, no focal  motor deficits  Outpatient Follow-Up  Future Appointments   Date Time Provider Department Center   12/5/2023  2:40 PM Amy Mart DO [id number]     Time to dc > 35 mins     Mukul Mei MD

## 2023-11-07 NOTE — NURSING NOTE
Discharge Note: 11/7/2023 1254 Declines wheelchair, ambulates to private car accompanied by PCT, personal belongings taken with pt, no distress noted, no complaints voiced. Jean Marie JOHANSEN

## 2023-11-07 NOTE — PROGRESS NOTES
"Og Cooper is a 78 y.o. male on day 0 of admission presenting with TIA (transient ischemic attack).    Subjective   Patient complained of slight right hand weakness still present but no other focal weakness or numbness or paralysis or paresthesias or tingling  No headache or focal diplopia or slurred speech or dysphagia  No nausea vomiting diarrhea or blurry vision or chest pain or shortness of breath         Objective     Physical Exam  General Appearance: AAO x 3, not in acute distress  Skin: skin color pink, warm, and dry; no suspicious rashes or lesions  Eyes : PERRL, EOM's intact  ENT: mucous membranes pink and moist  Neck: normocephalic  Respiratory: lungs clear to auscultation anteriorly; no wheezing, rhonchi, or crackles.   Heart: regular rate and rhythm. telemetry shows sinus rhythm  Abdomen: Nondistended, positive bowel sounds x4, soft,  nontender  Extremities: no edema   Peripheral pulses: normal x4 extremities  Neuro: alert, coherent and conversant, no focal motor deficits  Last Recorded Vitals  Blood pressure 132/72, pulse 68, temperature 36.6 °C (97.8 °F), temperature source Oral, resp. rate 18, height 1.702 m (5' 7\"), weight 96.2 kg (212 lb), SpO2 97 %.  Intake/Output last 3 Shifts:  I/O last 3 completed shifts:  In: 1700 (17.7 mL/kg) [P.O.:1200; IV Piggyback:500]  Out: 300 (3.1 mL/kg) [Urine:300 (0.1 mL/kg/hr)]  Weight: 96.2 kg     Relevant Results    Scheduled medications  aspirin, 81 mg, oral, Daily  atorvastatin, 40 mg, oral, Nightly  pantoprazole, 20 mg, oral, Daily before breakfast  traZODone, 50 mg, oral, Nightly      Continuous medications  sodium chloride 0.9%, 75 mL/hr      PRN medications    ECG 12 lead    Result Date: 11/6/2023  Ventricular pacing Abnormal EKG Confirmed by Sriram Olivo ([id number]) on 11/6/2023 12:40:18 PM    Vascular US carotid artery duplex bilateral    Result Date: 11/6/2023  Preliminary Cardiology Report             [id number] " [place] [address] Phone [phone], Fax [phone]      Preliminary Vascular Lab Report  [facility] US CAROTID ARTERY DUPLEX BILATERAL  Patient Name:     LORENZA CRUZ Reading Physician:  [id number] Caity Huerta MD Study Date:       11/6/2023      Ordering Provider:  [id number] RAD ESTRADA MRN/PID:          [id number]       Fellow: Accession#:       [id number]   Technologist:       Soo Matson RVT/AB YOB: 1945       Technologist 2: Gender:           M              Encounter#:         [id number] Admission Status: Inpatient      Location Performed: [facility]  Diagnosis/ICD: Other specified symptoms and signs involving the circulatory and                respiratory systems-R09.89  PRELIMINARY CONCLUSIONS:  Right Carotid: Findings are consistent with less than 50% stenosis of the right proximal internal carotid artery. Laminar flow seen by color Doppler. Right external carotid artery appears patent with no evidence of stenosis. The right vertebral artery is patent with antegrade flow. No evidence of hemodynamically significant stenosis in the right subclavian artery. Left Carotid: Findings are consistent with less than 50% stenosis of the left proximal internal carotid artery. Left external carotid artery appears patent with no evidence of stenosis. The left vertebral artery is patent with antegrade flow. No evidence of hemodynamically significant stenosis in the left subclavian artery. Additional Findings: Limited exam due to high bifurcation and tortuous vessels.  Imaging & Doppler Findings: Right Plaque Morph: No plaque identified in the right carotid artery. Left Plaque Morph: No plaque identified in the left carotid artery.   Right                       Left   PSV      EDV               PSV      EDV 79 cm/s  11 cm/s   CCA P   113 cm/s 25 cm/s 71 cm/s  13 cm/s   CCA D   85 cm/s  21 cm/s 72 cm/s  11 cm/s   ICA P    54 cm/s  15 cm/s 76 cm/s  13 cm/s   ICA D   58 cm/s  15 cm/s 118 cm/s 12 cm/s    ECA    128 cm/s 13 cm/s 67 cm/s  17 cm/s Vertebral 40 cm/s  10 cm/s  Right                                  Left   PSV    Waveform                       PSV    Waveform 105 cm/s          Subclavian Proximal 124 cm/s               Right Left ICA/CCA Ratio  1.0  0.6   VASCULAR PRELIMINARY REPORT completed by Soo Matson RVT/AB on 11/6/2023 at 9:13:31 AM  ** Final **     Transthoracic echo (TTE) complete    Result Date: 11/6/2023             [place] [address] Phone [phone], Fax [phone] TRANSTHORACIC ECHOCARDIOGRAM REPORT  Patient Name:     LORENZA CRUZ     Reading Physician:  [id number] Derick Ren MD Study Date:       11/6/2023          Ordering Provider:  [id number] RAD ESTRADA MRN/PID:          [id number]           Fellow: Accession#:       [id number]       Nurse:              Cristel Day RN Date of           1945 / 78      Sonographer:        TERRY Coughlin RVT Birth/Age:        years Gender:           M                  Additional Staff: Height:           170.18 cm          Admit Date:         11/5/2023 Weight:           96.16 kg           Admission Status:   Inpatient - Routine BSA:              2.07 m2            Department          [address]                                      Location: Blood Pressure: 156 /83 mmHg Study Type:    TRANSTHORACIC ECHO (TTE) COMPLETE Diagnosis/ICD: Other transient cerebral ischemic attacks and related                syndromes-G45.8 Indication:    Transischemic Attack CPT Codes:     Echo Complete w Full Doppler-[id number] Patient History: Pertinent History: No previous echo. Study Detail: The following Echo studies were performed: 2D and M-Mode. Definity               used as a contrast agent for endocardial border definition and               agitated saline used as a  contrast agent for intraseptal flow               evaluation. Total contrast used for this procedure was 2 mL via IV               push. The patient was awake.  PHYSICIAN INTERPRETATION: Left Ventricle: Left ventricular systolic function is mildly decreased, with an estimated ejection fraction of 40-45%. There is global hypokinesis of the left ventricle with minor regional variations. The left ventricular cavity size is normal. Abnormal (paradoxical) septal motion, consistent with RV pacemaker. Left ventricular diastolic filling was indeterminate. Left Atrium: The left atrium is mildly dilated. Right Ventricle: The right ventricle is normal in size. There is normal right ventricular global systolic function. Right Atrium: The right atrium is normal in size. Aortic Valve: There is a prosthetic aortic valve present. There is trivial aortic valve regurgitation. The peak instantaneous gradient of the aortic valve is 14.4 mmHg. The mean gradient of the aortic valve is 7.0 mmHg. Mitral Valve: The mitral valve is normal in structure. There is no evidence of mitral valve regurgitation. Tricuspid Valve: The tricuspid valve is structurally normal. There is mild tricuspid regurgitation. Pulmonic Valve: The pulmonic valve is not well visualized. There is no indication of pulmonic valve regurgitation. Pericardium: There is no pericardial effusion noted. Aorta: The aortic root was not well visualized.  CONCLUSIONS:  1. Left ventricular systolic function is mildly decreased with a 40-45% estimated ejection fraction.  2. There is global hypokinesis of the left ventricle with minor regional variations.  3. Abnormal septal motion consistent with RV pacemaker.  4. Mild tricuspid regurgitation is visualized.  5. Hemodynamics are consistent with normal functioning of the bioprosthetic TAVR valve. Leaflets of the TAVR valve are not well visualized.  6. No prior echocardiograms available for comparison.  7. Poorly visualized anatomical  structures due to suboptimal image quality. QUANTITATIVE DATA SUMMARY: 2D MEASUREMENTS:                           Normal Ranges: Ao Root d:     2.00 cm    (2.0-3.7cm) LAs:           2.90 cm    (2.7-4.0cm) IVSd:          1.31 cm    (0.6-1.1cm) LVPWd:         1.43 cm    (0.6-1.1cm) LVIDd:         4.21 cm    (3.9-5.9cm) LVIDs:         2.20 cm LV Mass Index: 105.1 g/m2 LV % FS        47.7 % LA VOLUME:                               Normal Ranges: LA Vol A4C:        77.4 ml    (22+/-6mL/m2) LA Vol A2C:        68.7 ml LA Vol BP:         73.3 ml LA Vol Index A4C:  37.4ml/m2 LA Vol Index A2C:  33.1 ml/m2 LA Vol Index BP:   35.4 ml/m2 LA Area A4C:       23.9 cm2 LA Area A2C:       22.4 cm2 LA Major Axis A4C: 6.3 cm LA Major Axis A2C: 6.2 cm LA Volume Index:   32.5 ml/m2 LA Vol A4C:        74.2 ml LA Vol A2C:        67.3 ml LV SYSTOLIC FUNCTION BY 2D PLANIMETRY (MOD):                     Normal Ranges: EF-A4C View: 61.7 % (>=55%) EF-A2C View: 35.2 % EF-Biplane:  46.1 % LV DIASTOLIC FUNCTION:                        Normal Ranges: MV Peak E:    0.98 m/s (0.7-1.2 m/s) MV Peak A:    0.63 m/s (0.42-0.7 m/s) E/A Ratio:    1.57     (1.0-2.2) MV e'         0.05 m/s (>8.0) MV lateral e' 0.10 m/s MV medial e'  0.06 m/s E/e' Ratio:   19.70    (<8.0) MITRAL VALVE:                 Normal Ranges: MV DT: 338 msec (150-240msec) MITRAL INSUFFICIENCY:                      Normal Ranges: MR Vmax: 413.00 cm/s AORTIC VALVE:                                    Normal Ranges: AoV Vmax:                1.90 m/s  (<=1.7m/s) AoV Peak P.4 mmHg (<20mmHg) AoV Mean P.0 mmHg  (1.7-11.5mmHg) LVOT Max Rober:            1.09 m/s  (<=1.1m/s) AoV VTI:                 46.30 cm  (18-25cm) LVOT VTI:                27.80 cm LVOT Diameter:           1.80 cm   (1.8-2.4cm) AoV Area, VTI:           1.53 cm2  (2.5-5.5cm2) AoV Area,Vmax:           1.46 cm2  (2.5-4.5cm2) AoV Dimensionless Index: 0.60 AORTIC INSUFFICIENCY: AI Vmax:       2.74  m/s AI Half-time:  476 msec AI Decel Rate: 149.00 cm/s2  RIGHT VENTRICLE: RV Basal 4.01 cm RV Mid   3.31 cm RV Major 7.7 cm TAPSE:   23.1 mm TRICUSPID VALVE/RVSP:                             Normal Ranges: Peak TR Velocity: 3.17 m/s RV Syst Pressure: 43.2 mmHg (< 30mmHg) PULMONIC VALVE:                         Normal Ranges: PV Accel Time: 109 msec (>120ms) PV Max Rober:    1.2 m/s  (0.6-0.9m/s) PV Max P.7 mmHg PIEDV:         1.14 m/s PADP:          8.2 mmHg  [id number] Derick Ren MD Electronically signed on 2023 at 8:08:11 AM  ** Final **     XR chest 1 view    Result Date: 2023  STUDY: Chest Radiograph;  2023 at 12:00 PM INDICATION: Weakness. COMPARISON: None Available ACCESSION NUMBER(S): [id number] ORDERING CLINICIAN: LILO WILDE TECHNIQUE:  Frontal chest was obtained at 12:00 hours. FINDINGS: CARDIOMEDIASTINAL SILHOUETTE: The cardiomediastinal silhouette is enlarged.  Cardiac pacer leads are noted within the right heart.  No evidence of discontinuity or kink. A left atrial appendage clip is also visualized..  LUNGS: The lungs demonstrate no focal pulmonary consolidation or pleural effusions..  ABDOMEN: No remarkable upper abdominal findings.  BONES: No acute osseous changes.    1.  Cardiomegaly. 2.  No focal pulmonary consolidation or pleural effusions. Signed by Mahesh Murillo MD    CT head wo IV contrast    Result Date: 2023  Interpreted By:  Chidi Lai, STUDY: CT HEAD WO IV CONTRAST; 2023 11:57 am   INDICATION: Signs/Symptoms:weakness to right hand/ fine motor trouble.   COMPARISON: None.   ACCESSION NUMBER(S): [id number]   ORDERING CLINICIAN: LILO WILDE   TECHNIQUE: Contiguous axial CT images were obtained through the head at 2 mm slice thickness without contrast administration.   FINDINGS: INTRACRANIAL: The ventricles, sulci and basal cisterns are within normal limits for size and configuration. The grey-white differentiation is intact. There is no mass effect or  midline shift. There is no extraaxial fluid collection. There is no intracranial hemorrhage.  The calvarium is unremarkable.   EXTRACRANIAL: Visualized paranasal sinuses and mastoids are clear.       No evidence of acute cortical infarct or intracranial hemorrhage.   MACRO: None     Signed by: Chidi Lai 11/5/2023 12:00 PM Dictation workstation:   [id number]            Results for orders placed or performed during the hospital encounter of 11/05/23 (from the past 24 hour(s))   Lipid panel   Result Value Ref Range    Cholesterol 130 0 - 199 mg/dL    HDL-Cholesterol 30.0 mg/dL    Cholesterol/HDL Ratio 4.3     LDL Calculated 75 <=99 mg/dL    VLDL 25 0 - 40 mg/dL    Triglycerides 125 0 - 149 mg/dL    Non HDL Cholesterol 100 0 - 149 mg/dL   Hemoglobin A1c   Result Value Ref Range    Hemoglobin A1C 5.8 (H) see below %    Estimated Average Glucose 120 Not Established mg/dL   Transthoracic echo (TTE) complete   Result Value Ref Range    LVOT diam 1.80     MV E/A ratio 1.57     AV pk aster 1.90     AV mn grad 7.0     LV biplane EF 46     Tricuspid annular plane systolic excursion 2.3     LA vol index A/L 35.4     MV avg E/e' ratio 19.70     LVIDd 4.21     RVSP 43.2     Aortic Valve Area by Continuity of Peak Velocity 1.46     Aortic Valve Area by Continuity of VTI 1.53     AV pk grad 14.4     LV A4C EF 61.7    Vascular US carotid artery duplex bilateral   Result Value Ref Range    BSA 2.13 m2             Assessment/Plan   Principal Problem:    TIA (transient ischemic attack)    78-year-old male with history of hypertension, A-fib on Eliquis, congenital aortic valve disease s/p TAVR, possible CVA, CHF, BPH, COVID-19 presented with strokelike symptoms to evaluate TIA versus acute CVA  Elevated creatinine questionable JOSÉ MANUEL     Plan  On stroke pathway  Cardiopulmonary monitoring  Follow vitals  On aspirin and statins  Hold Eliquis for now and repeat CT head, if that is negative then resume Eliquis   echocardiogram reviewed EF  40 to 45%, not in volume overload, paced rhythm and appreciate cardiology recommendations  Carotid ultrasound no significant stenosis  Fall, aspiration, seizure precautions  Neurochecks  Permissive hypertension start optimizing with medicines gradually so far stable pressure overall  PT OT SLP  Watch volume status  Not in CHF currently  Resume home medicines as appropriate  Daily CBC BMP  Advised to follow cardiology and neurology as outpatient  Bilateral SCDs for DVT prophylaxis  Continue current medicines  Troponin leak likely nonischemic myocardial injury  Hydrate carefully  Discussed with family  Avoid nephrotoxic medicines  PPI for GI prophylaxis  Avoid polypharmacy  On trazodone for depression                Mukul Mei MD

## 2023-11-07 NOTE — PROGRESS NOTES
Medication Education     Medication education for Og Cooper was provided to the patient  for the following medication(s):  No new medications on discharge.      Medication education provided by a Pharmacist:  ADR Counseling Medication interactions Dose, frequency, storage How to take and what to do if a dose is missed Proper dose, indication, possible ADRs Refilling the medication  How the medication works and benefits of taking it Benefits of taking the medication  Importance of compliance Necessary labs and/or other monitoring Any drug interactions (including OTCs and herbvals) and importance of notifying a healthcare provider of any medication changes Potential duration of therapy    Identified potential barriers to education:  None    Method(s) of Education:  Verbal Written materials provided and reviewed    An opportunity to ask questions and receive answers was provided.     Assessment of understanding the patient :  2= meets goals/outcomes    Additional Notes (if applicable): I left a business card with my name & callback number if further questions needed answered.    Christos Garcia, [facility]

## 2023-11-07 NOTE — PROGRESS NOTES
Subjective Data:  Patient reports feeling well, no new adverse events overnight. Patient denies any chest pain, shortness of breath, palpitations, dizziness or syncope. Patient is hemodynamically stable.    Overnight Events:    None     Objective Data:  Last Recorded Vitals:  Vitals:    11/07/23 0152 11/07/23 0154 11/07/23 0400 11/07/23 0745   BP:   139/89 128/72   BP Location:    Right arm   Patient Position:    Lying   Pulse:   64 61   Resp: 16  16 16   Temp:   36.6 °C (97.9 °F) 36.3 °C (97.3 °F)   TempSrc:    Temporal   SpO2:  97% 96% 94%   Weight:       Height:           Last Labs:  CBC - 11/5/2023: 12:04 PM  8.0 13.2 161    41.3      CMP - 11/5/2023: 12:04 PM  9.0 7.1 25 --- 0.6   _ 4.2 23 93      PTT - 11/5/2023: 12:04 PM  1.7   19.2 39     TROPHS   Date/Time Value Ref Range Status   11/05/2023 12:04 PM 31 0 - 20 ng/L Final     BNP   Date/Time Value Ref Range Status   11/05/2023 05:36  0 - 99 pg/mL Final     HGBA1C   Date/Time Value Ref Range Status   11/06/2023 05:01 AM 5.8 see below % Final   12/22/2021 02:55 PM 5.9 4.3 - 5.6 % Final     Comment:     American Diabetes Association guidelines indicate that patients with HgbA1c in   the range 5.7-6.4% are at increased risk for development of diabetes, and   intervention by lifestyle modification may be beneficial. HgbA1c greater or   equal to 6.5% is considered diagnostic of diabetes.     LDLCALC   Date/Time Value Ref Range Status   11/06/2023 05:01 AM 75 <=99 mg/dL Final     Comment:                                 Near   Borderline      AGE      Desirable  Optimal    High     High     Very High     0-19 Y     0 - 109     ---    110-129   >/= 130     ----    20-24 Y     0 - 119     ---    120-159   >/= 160     ----      >24 Y     0 -  99   100-129  130-159   160-189     >/=190     11/05/2023 05:36 PM 88 <=99 mg/dL Final     Comment:                                 Near   Borderline      AGE      Desirable  Optimal    High     High     Very High     0-19  Y     0 - 109     ---    110-129   >/= 130     ----    20-24 Y     0 - 119     ---    120-159   >/= 160     ----      >24 Y     0 -  99   100-129  130-159   160-189     >/=190       VLDL   Date/Time Value Ref Range Status   11/06/2023 05:01 AM 25 0 - 40 mg/dL Final   11/05/2023 05:36 PM 25 0 - 40 mg/dL Final   02/02/2023 09:30 AM 33 0 - 40 mg/dL Final      Last I/O:  I/O last 3 completed shifts:  In: 840 (8.7 mL/kg) [P.O.:840]  Out: 300 (3.1 mL/kg) [Urine:300 (0.1 mL/kg/hr)]  Weight: 96.2 kg     Past Cardiology Tests (Last 3 Years):  EKG:  ECG 12 lead 11/6/2023    Echo:  Transthoracic echo (TTE) complete 11/6/2023    Ejection Fractions:  EF   Date/Time Value Ref Range Status   11/06/2023 07:11 AM 46         Inpatient Medications:  Scheduled medications   Medication Dose Route Frequency    amLODIPine  5 mg oral Daily    apixaban  5 mg oral BID    aspirin  81 mg oral Daily    atorvastatin  40 mg oral Nightly    buPROPion XL  300 mg oral Daily    lisinopril  10 mg oral Daily    methylphenidate  5 mg oral Daily    metoprolol succinate XL  12.5 mg oral Daily    pantoprazole  20 mg oral Daily before breakfast     PRN medications   Medication    hydrALAZINE     Continuous Medications   Medication Dose Last Rate       Physical Exam:  General: alert, oriented and in no acute distress  HEENT: NC/AT; EOMI; PERRLA, external ear is normal  Neck: supple; trachea midline; no masses; no JVD  Chest: lungs clear to auscultation bilaterally; no wheezing  CVS: regular rhythm, S1S2 normal, no murmurs. Pacemaker.  Abdomen: Soft, non-tender, non-distension, no organomegaly  Extremities: no clubbing/cyanosis/edema  Neuro: Grossly intact     Psychiatric: Normal mood and affect     Assessment/Plan     Mr. Og Cooper is a 78 y.o. former smoker male being consulted by the Cardiology team for TIA. Patient with past medical history significant for hypertension, hyperlipidemia, morbid obesity, HFrEF (LVEF 40-45%), Atrial Fibrillation on  "Eliquis, S/P PPM,  S/P SAVR, S/P TAVR. Patient admitted to ED [facility] on 11/05/2023 complaining of weakness in R hand. He states that while having lunch and trying to hold a knife, he felt weakness in his fingers. Notably, at the hospital, his strength was already normal. He denies diplopia, slurred speech, dysphagia, other body weakness, dizziness, nausea, vomiting, numbness, chest pain, shortness of breath, palpitations, bleeding or syncope. Admitted to clinical assessment.      # Heart Failure with Reduced Ejection Fraction (LVEF 40-45%)  - Patient is asymptomatic from cardiac standpoint  - Looks \"warm and dry\"  - The EKG today shows ventricular paced rhythm. , Troponin 31.  - Echo (11/06/2023):  1. Left ventricular systolic function is mildly decreased with a 40-45% estimated ejection fraction.   2. There is global hypokinesis of the left ventricle with minor regional variations.   3. Abnormal septal motion consistent with RV pacemaker.   4. Mild tricuspid regurgitation is visualized.   5. Hemodynamics are consistent with normal functioning of the bioprosthetic TAVR valve. Leaflets of the TAVR valve are not well visualized.   6. No prior echocardiograms available for comparison.   7. Poorly visualized anatomical structures due to suboptimal image quality.  - Keep daily weights.   - Low sodium diet (2g).  - 1500mL fluid restriction.  - Strict I&Os.  - Electrolyte control and daily BMP including magnesium.  - Keep Lisinopril 20mg daily  - Keep Metoprolol succinate 12.5mg daily.  - Would consider to optimize Spironolactone and dapagliflozin as outpatient.   - Follow up in Cardiology clinic for medication optimization     # Atrial Fibrillation / S/P SAVR / S/P TAVR  - Elevated [id number]-FBCf score of 5 which implies higher risk for thromboembolic events yearly, therefore should continue on stroke prophylaxis with DOAC.  - Patient is currently asymptomatic on rate control strategy with Metoprolol succinate " 12.5mg daily and Eliquis 5mg BID.      # Hypertension  - Controlled BP  - Keep Amlodipine 5mg daily, Lisinopril, Metoprolol     # Hyperlipidemia  - Keep Atorvastatin 40mg daily     # TIA  - US duplex carotids:  Right Carotid: Findings are consistent with less than 50% stenosis of the right proximal internal carotid artery. Laminar flow seen by color Doppler. Right external carotid artery appears patent with no evidence of stenosis. The right vertebral artery is patent with antegrade flow. No evidence of hemodynamically significant stenosis in the right subclavian artery.  Left Carotid: Findings are consistent with less than 50% stenosis of the left proximal internal carotid artery. Left external carotid artery appears patent with no evidence of stenosis. The left vertebral artery is patent with antegrade flow. No evidence of hemodynamically significant stenosis in the left subclavian artery.  Additional Findings:  Limited exam due to high bifurcation and tortuous vessels.  - Neurology recs  - Would suggest to keep ASA, Eliquis, statin high intensity     Thank you for allowing me to participate in the care of this patient. Please reach me out if you have any questions or if you need any clarifications regarding the patient's care.    Peripheral IV 11/05/23 20 G Right Antecubital (Active)   Site Assessment Clean;Dry;Intact 11/07/23 0714   Dressing Status Clean;Dry 11/07/23 0714   Number of days: 2     Code Status:  Full Code    Sriram Noriega MD

## 2023-11-07 NOTE — NURSING NOTE
Discharge Note: 11/7/2023 1213 Discharge instructions and pt responsibilities reviewed with pt and copy given. TIA, stroke, education reviewed with pt and information sheets given. Pt verbalizes understanding of instructions received, verbalizes understanding of when to seek medical attention, denies any home going or personal care needs. Denies further questions or concerns. Reviewed follow up appts with pt and verbalizes understanding. Daughter Ammy notified of pt discharge. Jean Marie JOHANSEN

## 2023-11-08 RX ORDER — ESCITALOPRAM OXALATE 10 MG/1
10 TABLET ORAL DAILY
Qty: 30 TABLET | Refills: 5 | Status: SHIPPED | OUTPATIENT
Start: 2023-11-08 | End: 2024-03-08

## 2023-11-09 ENCOUNTER — PATIENT OUTREACH (OUTPATIENT)
Dept: CARE COORDINATION | Facility: CLINIC | Age: 78
End: 2023-11-09
Payer: MEDICARE

## 2023-11-09 NOTE — PROGRESS NOTES
Discharge Facility:  [facility]   Discharge Diagnosis:  TIA  Admission Date:   11/5/23  Discharge Date:  11/7/23    PCP Appointment Date:   12/5/23   Specialist Appointment Date:  cardio follow up   Hospital Encounter and Summary: Linked   See discharge assessment below for further details      Engagement  Call Start Time: 1030 (11/9/2023 10:49 AM)    Medications  Medications reviewed with patient/caregiver?: No (NO MED CHANGES) (11/9/2023 10:49 AM)  Is the patient having any side effects they believe may be caused by any medication additions or changes?: No (11/9/2023 10:49 AM)  Does the patient have all medications ordered at discharge?: Yes (11/9/2023 10:49 AM)  Prescription Comments: NO NEW SCRIPTS (11/9/2023 10:49 AM)  Medication Comments: pt denies problems obtaining or affording medication (11/9/2023 10:49 AM)    Appointments  Does the patient have a primary care provider?: Yes (11/9/2023 10:49 AM)  Care Management Interventions: Verified appointment date/time/provider (TASK TO  office to check for earlier appt) (11/9/2023 10:49 AM)    Self Management  What is the home health agency?: denies need (11/9/2023 10:49 AM)  Has home health visited the patient within 72 hours of discharge?: Not applicable (11/9/2023 10:49 AM)    Patient Teaching  Does the patient have access to their discharge instructions?: Yes (11/9/2023 10:49 AM)  Care Management Interventions: Reviewed instructions with patient (11/9/2023 10:49 AM)  What is the patient's perception of their health status since discharge?: Improving (11/9/2023 10:49 AM)  Is the patient/caregiver able to teach back the hierarchy of who to call/visit for symptoms/problems? PCP, Specialist, Home Health nurse, Urgent Care, ED, 911: Yes (11/9/2023 10:49 AM)  Patient/Caregiver Education Comments: This CM spoke with pt via phone. Pt reports doing well at home since discharge. No New meds. Pt denies CP and SOB.  Pt aware of my availability for non emergent  concerns. Contact info provided to patient (11/9/2023 10:49 AM)

## 2023-11-11 DIAGNOSIS — I10 ESSENTIAL (PRIMARY) HYPERTENSION: ICD-10-CM

## 2023-11-13 ENCOUNTER — TELEPHONE (OUTPATIENT)
Dept: PRIMARY CARE | Facility: CLINIC | Age: 78
End: 2023-11-13
Payer: MEDICARE

## 2023-11-13 RX ORDER — AMLODIPINE BESYLATE 5 MG/1
TABLET ORAL
Qty: 90 TABLET | Refills: 1 | Status: SHIPPED | OUTPATIENT
Start: 2023-11-13 | End: 2024-03-04 | Stop reason: SDUPTHER

## 2023-11-13 NOTE — TELEPHONE ENCOUNTER
Pt admitted to [facility] for stroke symptoms; however they could not do MRI due to pacemaker; patients daughter requesting MRI to be done at  which is where her father's cardiologist is; Asking for Pacemaker safe MRI order be faxed to [phone]

## 2023-11-14 ENCOUNTER — OFFICE VISIT (OUTPATIENT)
Dept: PRIMARY CARE | Facility: CLINIC | Age: 78
End: 2023-11-14
Payer: MEDICARE

## 2023-11-14 VITALS
BODY MASS INDEX: 33.43 KG/M2 | DIASTOLIC BLOOD PRESSURE: 76 MMHG | HEIGHT: 67 IN | SYSTOLIC BLOOD PRESSURE: 125 MMHG | WEIGHT: 213 LBS | HEART RATE: 79 BPM

## 2023-11-14 DIAGNOSIS — R29.898 RIGHT HAND WEAKNESS: Primary | ICD-10-CM

## 2023-11-14 DIAGNOSIS — I50.22 CHRONIC SYSTOLIC CONGESTIVE HEART FAILURE (MULTI): ICD-10-CM

## 2023-11-14 DIAGNOSIS — G45.9 TIA (TRANSIENT ISCHEMIC ATTACK): ICD-10-CM

## 2023-11-14 DIAGNOSIS — I10 ESSENTIAL HYPERTENSION: ICD-10-CM

## 2023-11-14 DIAGNOSIS — I50.22 CHRONIC SYSTOLIC CONGESTIVE HEART FAILURE (MULTI): Primary | ICD-10-CM

## 2023-11-14 PROBLEM — Z09 HOSPITAL DISCHARGE FOLLOW-UP: Status: ACTIVE | Noted: 2023-11-14

## 2023-11-14 PROCEDURE — 3074F SYST BP LT 130 MM HG: CPT | Performed by: INTERNAL MEDICINE

## 2023-11-14 PROCEDURE — 1159F MED LIST DOCD IN RCRD: CPT | Performed by: INTERNAL MEDICINE

## 2023-11-14 PROCEDURE — 3078F DIAST BP <80 MM HG: CPT | Performed by: INTERNAL MEDICINE

## 2023-11-14 PROCEDURE — 1036F TOBACCO NON-USER: CPT | Performed by: INTERNAL MEDICINE

## 2023-11-14 PROCEDURE — 99214 OFFICE O/P EST MOD 30 MIN: CPT | Performed by: INTERNAL MEDICINE

## 2023-11-14 PROCEDURE — 1160F RVW MEDS BY RX/DR IN RCRD: CPT | Performed by: INTERNAL MEDICINE

## 2023-11-14 PROCEDURE — 1126F AMNT PAIN NOTED NONE PRSNT: CPT | Performed by: INTERNAL MEDICINE

## 2023-11-14 ASSESSMENT — ENCOUNTER SYMPTOMS
APPETITE CHANGE: 0
WEAKNESS: 0
BACK PAIN: 0
NUMBNESS: 0
SORE THROAT: 0
SHORTNESS OF BREATH: 0
DIARRHEA: 0
ABDOMINAL DISTENTION: 0
SINUS PRESSURE: 0
ACTIVITY CHANGE: 0
VOMITING: 0
COUGH: 0
WHEEZING: 0
FATIGUE: 0
CHILLS: 0
NAUSEA: 0
SINUS PAIN: 0

## 2023-11-14 NOTE — PROGRESS NOTES
"Subjective   Patient ID: Og Cooper is a 78 y.o. male who presents for Follow-up (Discuss MRI order to be faxed to [facility] (See phone encounter)/Hospitalized for stroke like symptoms ).  HPI  Patient is here today for hospital follow up   Patient reports that he woke up one day and was unable to use his right hand to operate his macbook and could not work the microwave. Went to [employer] and was trying to eat potluck lunch and he was unable to wield his knife. He denied weakness anywhere else or slurred speech just loss of extremity of the right hand. He was admitted to [facility], She had a CT of the head that was negative.She has an appt with neurology,. He reports that thw weakness of his hand has improved some but it is not at baseline as he had trouble buttoning his shirt this morning Was set up with OT. Needs Mr brain wo contrast that is compatible with pacemaker. Daughter requesting [facility].     Review of Systems   Constitutional:  Negative for activity change, appetite change, chills and fatigue.   HENT:  Negative for congestion, postnasal drip, sinus pressure, sinus pain and sore throat.    Respiratory:  Negative for cough, shortness of breath and wheezing.    Cardiovascular:  Negative for chest pain and leg swelling.   Gastrointestinal:  Negative for abdominal distention, diarrhea, nausea and vomiting.   Musculoskeletal:  Negative for back pain.   Neurological:  Negative for weakness and numbness.       Objective   /76   Pulse 79   Ht 1.702 m (5' 7\")   Wt 96.6 kg (213 lb)   BMI 33.36 kg/m²     Physical Exam  Constitutional:       General: He is not in acute distress.     Appearance: Normal appearance.   HENT:      Head: Normocephalic.      Nose: Nose normal.      Mouth/Throat:      Pharynx: No oropharyngeal exudate.   Eyes:      General:         Right eye: No discharge.         Left eye: No discharge.      Extraocular Movements: Extraocular movements intact.      Pupils: Pupils are equal, " round, and reactive to light.   Cardiovascular:      Rate and Rhythm: Normal rate and regular rhythm.      Heart sounds: No murmur heard.     No gallop.   Pulmonary:      Effort: Pulmonary effort is normal. No respiratory distress.      Breath sounds: Normal breath sounds. No wheezing.   Musculoskeletal:         General: No swelling. Normal range of motion.      Comments: Strength in upper extremities symmetrical 5/5 but dexterity in right hand does seem diminished compared to the left    Skin:     General: Skin is warm and dry.      Coloration: Skin is not jaundiced.   Neurological:      General: No focal deficit present.      Mental Status: He is alert and oriented to person, place, and time.      Cranial Nerves: No cranial nerve deficit.   Psychiatric:         Mood and Affect: Mood normal.         Behavior: Behavior normal.           Assessment/Plan   Problem List Items Addressed This Visit       Essential hypertension    Chronic systolic congestive heart failure (CMS/HCC)    RESOLVED: TIA (transient ischemic attack)    Right hand weakness - Primary    Relevant Orders    MR brain wo IV contrast     Hospital follow up for possible CVA, right hand weakness, improved some   - reviewed hospital records, CT head negative   - unable to do MRI due machine not compatible with pacemaker, will order Mri brain wo contrast, daughter requesting to be done at [facility]   - has appt with neurologist at [facility] tomorrow   - on asa 81mg and Lipitor 40mg po daily     Final diagnoses:   [R29.898] Right hand weakness   [G45.9] TIA (transient ischemic attack)   [I50.22] Chronic systolic congestive heart failure (CMS/HCC)   [I10] Essential hypertension

## 2023-11-21 ENCOUNTER — APPOINTMENT (OUTPATIENT)
Dept: PRIMARY CARE | Facility: CLINIC | Age: 78
End: 2023-11-21
Payer: MEDICARE

## 2023-11-22 DIAGNOSIS — F90.9 ATTENTION DEFICIT HYPERACTIVITY DISORDER (ADHD), UNSPECIFIED ADHD TYPE: ICD-10-CM

## 2023-11-22 RX ORDER — METHYLPHENIDATE HYDROCHLORIDE 5 MG/1
5 TABLET ORAL DAILY
Qty: 30 TABLET | Refills: 0 | Status: SHIPPED | OUTPATIENT
Start: 2023-11-22 | End: 2023-12-21 | Stop reason: SDUPTHER

## 2023-11-24 ENCOUNTER — PATIENT OUTREACH (OUTPATIENT)
Dept: PRIMARY CARE | Facility: CLINIC | Age: 78
End: 2023-11-24
Payer: MEDICARE

## 2023-11-24 NOTE — PROGRESS NOTES
Call regarding appt. with PCP on 11/14 after hospitalization.  At time of outreach call the patient feels as if their condition has (improved) since last visit.  Pt aware of my availability for non emergent concerns. Contact info provided to patient.

## 2023-12-05 ENCOUNTER — APPOINTMENT (OUTPATIENT)
Dept: PRIMARY CARE | Facility: CLINIC | Age: 78
End: 2023-12-05
Payer: MEDICARE

## 2023-12-21 DIAGNOSIS — F90.9 ATTENTION DEFICIT HYPERACTIVITY DISORDER (ADHD), UNSPECIFIED ADHD TYPE: ICD-10-CM

## 2023-12-21 RX ORDER — METHYLPHENIDATE HYDROCHLORIDE 5 MG/1
5 TABLET ORAL DAILY
Qty: 30 TABLET | Refills: 0 | Status: SHIPPED | OUTPATIENT
Start: 2023-12-21 | End: 2024-01-22 | Stop reason: SDUPTHER

## 2024-01-02 ENCOUNTER — PATIENT OUTREACH (OUTPATIENT)
Dept: PRIMARY CARE | Facility: CLINIC | Age: 79
End: 2024-01-02
Payer: MEDICARE

## 2024-01-02 NOTE — PROGRESS NOTES
Call placed regarding one month post discharge follow up call. Pt reports doing well at home. Pt aware of my availability for non emergent concerns. Contact info provided to patient.

## 2024-01-09 ENCOUNTER — OFFICE VISIT (OUTPATIENT)
Dept: PRIMARY CARE | Facility: CLINIC | Age: 79
End: 2024-01-09
Payer: MEDICARE

## 2024-01-09 VITALS
HEIGHT: 67 IN | BODY MASS INDEX: 33.12 KG/M2 | WEIGHT: 211 LBS | HEART RATE: 70 BPM | SYSTOLIC BLOOD PRESSURE: 135 MMHG | DIASTOLIC BLOOD PRESSURE: 79 MMHG

## 2024-01-09 DIAGNOSIS — I77.810 AORTIC ROOT DILATATION (CMS-HCC): ICD-10-CM

## 2024-01-09 DIAGNOSIS — H61.23 BILATERAL IMPACTED CERUMEN: ICD-10-CM

## 2024-01-09 DIAGNOSIS — F33.41 RECURRENT MAJOR DEPRESSIVE DISORDER, IN PARTIAL REMISSION (CMS-HCC): Primary | ICD-10-CM

## 2024-01-09 DIAGNOSIS — N40.1 BENIGN PROSTATIC HYPERPLASIA WITH URINARY OBSTRUCTION AND OTHER LOWER URINARY TRACT SYMPTOMS: ICD-10-CM

## 2024-01-09 DIAGNOSIS — I50.22 CHRONIC SYSTOLIC CONGESTIVE HEART FAILURE (MULTI): ICD-10-CM

## 2024-01-09 DIAGNOSIS — I48.0 PAROXYSMAL ATRIAL FIBRILLATION (MULTI): ICD-10-CM

## 2024-01-09 DIAGNOSIS — E66.01 CLASS 2 SEVERE OBESITY WITH BODY MASS INDEX (BMI) OF 35 TO 39.9 WITH SERIOUS COMORBIDITY (MULTI): ICD-10-CM

## 2024-01-09 DIAGNOSIS — N13.8 BENIGN PROSTATIC HYPERPLASIA WITH URINARY OBSTRUCTION AND OTHER LOWER URINARY TRACT SYMPTOMS: ICD-10-CM

## 2024-01-09 DIAGNOSIS — K21.9 GASTROESOPHAGEAL REFLUX DISEASE WITHOUT ESOPHAGITIS: ICD-10-CM

## 2024-01-09 PROCEDURE — 1036F TOBACCO NON-USER: CPT | Performed by: INTERNAL MEDICINE

## 2024-01-09 PROCEDURE — 3075F SYST BP GE 130 - 139MM HG: CPT | Performed by: INTERNAL MEDICINE

## 2024-01-09 PROCEDURE — 1126F AMNT PAIN NOTED NONE PRSNT: CPT | Performed by: INTERNAL MEDICINE

## 2024-01-09 PROCEDURE — 1159F MED LIST DOCD IN RCRD: CPT | Performed by: INTERNAL MEDICINE

## 2024-01-09 PROCEDURE — 3078F DIAST BP <80 MM HG: CPT | Performed by: INTERNAL MEDICINE

## 2024-01-09 PROCEDURE — 99214 OFFICE O/P EST MOD 30 MIN: CPT | Performed by: INTERNAL MEDICINE

## 2024-01-09 PROCEDURE — 1160F RVW MEDS BY RX/DR IN RCRD: CPT | Performed by: INTERNAL MEDICINE

## 2024-01-09 NOTE — PROGRESS NOTES
"Subjective   Patient ID: Og Cooper is a 78 y.o. male who presents for Follow-up (3 month/+Cough/+Congestion x5 days; brining up green mucus ).  HPI  Patient is here today for sick visit.   Pt reports that he had had 5 days of cough, congestion and he had a negative covid test.   He is coughing up green mucus.     Review of Systems   HENT:  Facial swelling: ent.        Objective   /79   Pulse 70   Ht 1.702 m (5' 7\")   Wt 95.7 kg (211 lb)   BMI 33.05 kg/m²     Physical Exam  Constitutional:       General: He is not in acute distress.     Appearance: Normal appearance.   HENT:      Head: Normocephalic.      Right Ear: Tympanic membrane, ear canal and external ear normal. There is no impacted cerumen.      Left Ear: There is impacted cerumen.      Nose: Nose normal. No rhinorrhea.      Mouth/Throat:      Mouth: Mucous membranes are moist.      Pharynx: No oropharyngeal exudate.   Eyes:      General:         Right eye: No discharge.         Left eye: No discharge.      Extraocular Movements: Extraocular movements intact.      Pupils: Pupils are equal, round, and reactive to light.   Cardiovascular:      Rate and Rhythm: Normal rate and regular rhythm.      Heart sounds: No murmur heard.     No gallop.   Pulmonary:      Effort: Pulmonary effort is normal. No respiratory distress.      Breath sounds: Normal breath sounds. No wheezing.   Musculoskeletal:         General: No swelling. Normal range of motion.   Skin:     General: Skin is warm and dry.      Coloration: Skin is not jaundiced.   Neurological:      General: No focal deficit present.      Mental Status: He is alert and oriented to person, place, and time.      Cranial Nerves: No cranial nerve deficit.   Psychiatric:         Mood and Affect: Mood normal.         Behavior: Behavior normal.           Assessment/Plan   Problem List Items Addressed This Visit       Atrial fibrillation (CMS/HCC)    Benign prostatic hyperplasia with urinary obstruction and " other lower urinary tract symptoms    Class 2 severe obesity with body mass index (BMI) of 35 to 39.9 with serious comorbidity (CMS/HCC)    GERD (gastroesophageal reflux disease)    Chronic systolic congestive heart failure (CMS/HCC)    Aortic root dilatation (CMS/HCC)    Recurrent major depressive disorder, in partial remission (CMS/HCC) - Primary     Recommend getting flu and rsv at pharmacy   Viral infection - resolving so will hold any antibiotics   Hx of  Cva in Nov 23, did see neurology, recommended mri which needs to be coordinated with his medronic device   - continue current meds, statin asa, eliquis   4. Depression, stable doing well   - off lexapro   - on wellbutrin and ritalin    Final diagnoses:   [F33.41] Recurrent major depressive disorder, in partial remission (CMS/HCC)   [I50.22] Chronic systolic congestive heart failure (CMS/HCC)   [I48.0] Paroxysmal atrial fibrillation (CMS/HCC)   [I77.810] Aortic root dilatation (CMS/HCC)   [E66.01] Class 2 severe obesity with body mass index (BMI) of 35 to 39.9 with serious comorbidity (CMS/HCC)   [K21.9] Gastroesophageal reflux disease without esophagitis   [N40.1, N13.8] Benign prostatic hyperplasia with urinary obstruction and other lower urinary tract symptoms

## 2024-01-12 ENCOUNTER — TELEPHONE (OUTPATIENT)
Dept: PRIMARY CARE | Facility: CLINIC | Age: 79
End: 2024-01-12
Payer: MEDICARE

## 2024-01-12 DIAGNOSIS — J06.9 UPPER RESPIRATORY INFECTION WITH COUGH AND CONGESTION: ICD-10-CM

## 2024-01-12 RX ORDER — AZITHROMYCIN 500 MG/1
500 TABLET, FILM COATED ORAL DAILY
Qty: 5 TABLET | Refills: 0 | Status: SHIPPED | OUTPATIENT
Start: 2024-01-12 | End: 2024-01-17

## 2024-01-12 RX ORDER — PREDNISONE 20 MG/1
TABLET ORAL
Qty: 18 TABLET | Refills: 0 | Status: SHIPPED | OUTPATIENT
Start: 2024-01-12 | End: 2024-05-15

## 2024-01-16 ENCOUNTER — TELEPHONE (OUTPATIENT)
Dept: PRIMARY CARE | Facility: CLINIC | Age: 79
End: 2024-01-16
Payer: MEDICARE

## 2024-01-16 DIAGNOSIS — Z79.2 PROPHYLACTIC ANTIBIOTIC: Primary | ICD-10-CM

## 2024-01-16 RX ORDER — CLARITHROMYCIN 500 MG/1
500 TABLET, FILM COATED ORAL ONCE
Qty: 1 TABLET | Refills: 0 | Status: SHIPPED | OUTPATIENT
Start: 2024-01-16 | End: 2024-01-16

## 2024-01-22 DIAGNOSIS — F90.9 ATTENTION DEFICIT HYPERACTIVITY DISORDER (ADHD), UNSPECIFIED ADHD TYPE: ICD-10-CM

## 2024-01-22 RX ORDER — METHYLPHENIDATE HYDROCHLORIDE 5 MG/1
5 TABLET ORAL DAILY
Qty: 30 TABLET | Refills: 0 | Status: SHIPPED | OUTPATIENT
Start: 2024-01-22 | End: 2024-02-20 | Stop reason: SDUPTHER

## 2024-02-01 ENCOUNTER — PATIENT OUTREACH (OUTPATIENT)
Dept: PRIMARY CARE | Facility: CLINIC | Age: 79
End: 2024-02-01
Payer: MEDICARE

## 2024-02-20 DIAGNOSIS — I10 ESSENTIAL (PRIMARY) HYPERTENSION: ICD-10-CM

## 2024-02-20 DIAGNOSIS — F90.9 ATTENTION DEFICIT HYPERACTIVITY DISORDER (ADHD), UNSPECIFIED ADHD TYPE: ICD-10-CM

## 2024-02-21 RX ORDER — METHYLPHENIDATE HYDROCHLORIDE 5 MG/1
5 TABLET ORAL DAILY
Qty: 30 TABLET | Refills: 0 | Status: SHIPPED | OUTPATIENT
Start: 2024-02-21 | End: 2024-03-21 | Stop reason: SDUPTHER

## 2024-03-02 DIAGNOSIS — F32.A DEPRESSION, UNSPECIFIED: ICD-10-CM

## 2024-03-02 DIAGNOSIS — R60.9 EDEMA, UNSPECIFIED: ICD-10-CM

## 2024-03-02 DIAGNOSIS — I10 ESSENTIAL (PRIMARY) HYPERTENSION: ICD-10-CM

## 2024-03-04 ENCOUNTER — PHARMACY VISIT (OUTPATIENT)
Dept: PHARMACY | Facility: CLINIC | Age: 79
End: 2024-03-04
Payer: COMMERCIAL

## 2024-03-04 DIAGNOSIS — R60.9 EDEMA, UNSPECIFIED: ICD-10-CM

## 2024-03-04 DIAGNOSIS — K21.9 GASTROESOPHAGEAL REFLUX DISEASE WITHOUT ESOPHAGITIS: ICD-10-CM

## 2024-03-04 DIAGNOSIS — F32.A DEPRESSION, UNSPECIFIED: ICD-10-CM

## 2024-03-04 DIAGNOSIS — E78.2 MIXED HYPERLIPIDEMIA: ICD-10-CM

## 2024-03-04 DIAGNOSIS — I10 ESSENTIAL (PRIMARY) HYPERTENSION: ICD-10-CM

## 2024-03-04 PROCEDURE — RXMED WILLOW AMBULATORY MEDICATION CHARGE

## 2024-03-04 RX ORDER — ATORVASTATIN CALCIUM 40 MG/1
40 TABLET, FILM COATED ORAL NIGHTLY
Qty: 90 TABLET | Refills: 3 | Status: SHIPPED | OUTPATIENT
Start: 2024-03-04

## 2024-03-04 RX ORDER — METOPROLOL SUCCINATE 25 MG/1
12.5 TABLET, EXTENDED RELEASE ORAL DAILY
Qty: 45 TABLET | Refills: 3 | Status: SHIPPED | OUTPATIENT
Start: 2024-03-04

## 2024-03-04 RX ORDER — APIXABAN 5 MG/1
5 TABLET, FILM COATED ORAL 2 TIMES DAILY
Qty: 180 TABLET | Refills: 3 | Status: SHIPPED | OUTPATIENT
Start: 2024-03-04 | End: 2024-03-04 | Stop reason: SDUPTHER

## 2024-03-04 RX ORDER — OMEPRAZOLE 20 MG/1
20 CAPSULE, DELAYED RELEASE ORAL DAILY
Qty: 90 CAPSULE | Refills: 3 | Status: SHIPPED | OUTPATIENT
Start: 2024-03-04

## 2024-03-04 RX ORDER — AMLODIPINE BESYLATE 5 MG/1
5 TABLET ORAL DAILY
Qty: 90 TABLET | Refills: 3 | Status: SHIPPED | OUTPATIENT
Start: 2024-03-04

## 2024-03-04 RX ORDER — FUROSEMIDE 20 MG/1
TABLET ORAL
Qty: 90 TABLET | Refills: 3 | Status: SHIPPED | OUTPATIENT
Start: 2024-03-04

## 2024-03-04 RX ORDER — LISINOPRIL 10 MG/1
10 TABLET ORAL DAILY
Qty: 90 TABLET | Refills: 3 | Status: SHIPPED | OUTPATIENT
Start: 2024-03-04 | End: 2024-05-11 | Stop reason: DRUGHIGH

## 2024-03-04 RX ORDER — FUROSEMIDE 20 MG/1
TABLET ORAL
Qty: 90 TABLET | Refills: 1 | Status: SHIPPED | OUTPATIENT
Start: 2024-03-04 | End: 2024-03-04 | Stop reason: SDUPTHER

## 2024-03-04 RX ORDER — METOPROLOL SUCCINATE 25 MG/1
TABLET, EXTENDED RELEASE ORAL
Qty: 45 TABLET | Refills: 3 | Status: SHIPPED | OUTPATIENT
Start: 2024-03-04 | End: 2024-03-04 | Stop reason: SDUPTHER

## 2024-03-08 DIAGNOSIS — F41.9 ANXIETY: ICD-10-CM

## 2024-03-08 RX ORDER — ESCITALOPRAM OXALATE 10 MG/1
10 TABLET ORAL DAILY
Qty: 90 TABLET | Refills: 1 | Status: SHIPPED | OUTPATIENT
Start: 2024-03-08

## 2024-03-21 DIAGNOSIS — F90.9 ATTENTION DEFICIT HYPERACTIVITY DISORDER (ADHD), UNSPECIFIED ADHD TYPE: ICD-10-CM

## 2024-03-21 RX ORDER — METHYLPHENIDATE HYDROCHLORIDE 5 MG/1
5 TABLET ORAL DAILY
Qty: 30 TABLET | Refills: 0 | Status: SHIPPED | OUTPATIENT
Start: 2024-03-21 | End: 2024-04-18 | Stop reason: SDUPTHER

## 2024-04-16 ENCOUNTER — PHARMACY VISIT (OUTPATIENT)
Dept: PHARMACY | Facility: CLINIC | Age: 79
End: 2024-04-16
Payer: COMMERCIAL

## 2024-04-16 PROCEDURE — RXMED WILLOW AMBULATORY MEDICATION CHARGE

## 2024-04-18 DIAGNOSIS — F90.9 ATTENTION DEFICIT HYPERACTIVITY DISORDER (ADHD), UNSPECIFIED ADHD TYPE: ICD-10-CM

## 2024-04-18 RX ORDER — METHYLPHENIDATE HYDROCHLORIDE 5 MG/1
5 TABLET ORAL DAILY
Qty: 30 TABLET | Refills: 0 | Status: SHIPPED | OUTPATIENT
Start: 2024-04-18 | End: 2024-05-20 | Stop reason: SDUPTHER

## 2024-05-01 ENCOUNTER — TELEPHONE (OUTPATIENT)
Dept: PRIMARY CARE | Facility: CLINIC | Age: 79
End: 2024-05-01
Payer: MEDICARE

## 2024-05-01 NOTE — TELEPHONE ENCOUNTER
Og is a Amy Barron patient who is interested in a one time consultation with Dr Andrade. I let patient know about not seeing two providers at a time, he stated he would pay out of pocket and only wants this to be a one time consultation, then go back to Dr Mart in July.   He wanted me to tell you was a  and recent  and even though he voiced understanding of legalities, he still wanted me to ask if you would speak with him. I asked him for more information about the appointment, he kept saying it was for a one time consultation. I mentioned he may be able to do a virtual visit through [facility] my chart, and also possible go to [facility]

## 2024-05-06 ENCOUNTER — HOSPITAL ENCOUNTER (EMERGENCY)
Facility: HOSPITAL | Age: 79
Discharge: HOME | End: 2024-05-06
Payer: MEDICARE

## 2024-05-06 ENCOUNTER — APPOINTMENT (OUTPATIENT)
Dept: RADIOLOGY | Facility: HOSPITAL | Age: 79
End: 2024-05-06
Payer: MEDICARE

## 2024-05-06 ENCOUNTER — HOSPITAL ENCOUNTER (OUTPATIENT)
Dept: CARDIOLOGY | Facility: HOSPITAL | Age: 79
Discharge: HOME | End: 2024-05-06
Payer: MEDICARE

## 2024-05-06 VITALS
WEIGHT: 208 LBS | SYSTOLIC BLOOD PRESSURE: 151 MMHG | DIASTOLIC BLOOD PRESSURE: 78 MMHG | HEART RATE: 66 BPM | HEIGHT: 67 IN | RESPIRATION RATE: 18 BRPM | BODY MASS INDEX: 32.65 KG/M2 | OXYGEN SATURATION: 97 % | TEMPERATURE: 97.5 F

## 2024-05-06 DIAGNOSIS — S09.90XA HEAD INJURY, INITIAL ENCOUNTER: ICD-10-CM
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
## 2024-05-06 LAB
ALBUMIN SERPL BCP-MCNC: 4.1 G/DL (ref 3.4–5)
ALP SERPL-CCNC: 101 U/L (ref 33–136)
ALT SERPL W P-5'-P-CCNC: 22 U/L (ref 10–52)
ANION GAP SERPL CALC-SCNC: 10 MMOL/L (ref 10–20)
APPEARANCE UR: CLEAR
AST SERPL W P-5'-P-CCNC: 26 U/L (ref 9–39)
BASOPHILS # BLD AUTO: 0.04 X10*3/UL (ref 0–0.1)
BASOPHILS NFR BLD AUTO: 0.5 %
BILIRUB SERPL-MCNC: 0.5 MG/DL (ref 0–1.2)
BILIRUB UR STRIP.AUTO-MCNC: NEGATIVE MG/DL
BUN SERPL-MCNC: 33 MG/DL (ref 6–23)
CALCIUM SERPL-MCNC: 9 MG/DL (ref 8.6–10.3)
CARDIAC TROPONIN I PNL SERPL HS: 27 NG/L (ref 0–20)
CARDIAC TROPONIN I PNL SERPL HS: 31 NG/L (ref 0–20)
CHLORIDE SERPL-SCNC: 106 MMOL/L (ref 98–107)
CO2 SERPL-SCNC: 26 MMOL/L (ref 21–32)
COLOR UR: YELLOW
CREAT SERPL-MCNC: 1.5 MG/DL (ref 0.5–1.3)
EGFRCR SERPLBLD CKD-EPI 2021: 48 ML/MIN/1.73M*2
EOSINOPHIL # BLD AUTO: 0.57 X10*3/UL (ref 0–0.4)
EOSINOPHIL NFR BLD AUTO: 6.5 %
ERYTHROCYTE [DISTWIDTH] IN BLOOD BY AUTOMATED COUNT: 14.4 % (ref 11.5–14.5)
GLUCOSE SERPL-MCNC: 117 MG/DL (ref 74–99)
GLUCOSE UR STRIP.AUTO-MCNC: NEGATIVE MG/DL
HCT VFR BLD AUTO: 40.2 % (ref 41–52)
HGB BLD-MCNC: 12.8 G/DL (ref 13.5–17.5)
HOLD SPECIMEN: NORMAL
IMM GRANULOCYTES # BLD AUTO: 0.02 X10*3/UL (ref 0–0.5)
IMM GRANULOCYTES NFR BLD AUTO: 0.2 % (ref 0–0.9)
KETONES UR STRIP.AUTO-MCNC: NEGATIVE MG/DL
LEUKOCYTE ESTERASE UR QL STRIP.AUTO: NEGATIVE
LYMPHOCYTES # BLD AUTO: 1.36 X10*3/UL (ref 0.8–3)
LYMPHOCYTES NFR BLD AUTO: 15.5 %
MCH RBC QN AUTO: 27.2 PG (ref 26–34)
MCHC RBC AUTO-ENTMCNC: 31.8 G/DL (ref 32–36)
MCV RBC AUTO: 86 FL (ref 80–100)
MONOCYTES # BLD AUTO: 0.77 X10*3/UL (ref 0.05–0.8)
MONOCYTES NFR BLD AUTO: 8.8 %
NEUTROPHILS # BLD AUTO: 6.02 X10*3/UL (ref 1.6–5.5)
NEUTROPHILS NFR BLD AUTO: 68.5 %
NITRITE UR QL STRIP.AUTO: NEGATIVE
NRBC BLD-RTO: 0 /100 WBCS (ref 0–0)
PH UR STRIP.AUTO: 5 [PH]
PLATELET # BLD AUTO: 180 X10*3/UL (ref 150–450)
POTASSIUM SERPL-SCNC: 4.8 MMOL/L (ref 3.5–5.3)
PROT SERPL-MCNC: 6.5 G/DL (ref 6.4–8.2)
PROT UR STRIP.AUTO-MCNC: NEGATIVE MG/DL
RBC # BLD AUTO: 4.7 X10*6/UL (ref 4.5–5.9)
RBC # UR STRIP.AUTO: NEGATIVE /UL
SODIUM SERPL-SCNC: 137 MMOL/L (ref 136–145)
SP GR UR STRIP.AUTO: 1.01
UROBILINOGEN UR STRIP.AUTO-MCNC: <2 MG/DL
WBC # BLD AUTO: 8.8 X10*3/UL (ref 4.4–11.3)

## 2024-05-06 PROCEDURE — 71045 X-RAY EXAM CHEST 1 VIEW: CPT

## 2024-05-06 PROCEDURE — 96360 HYDRATION IV INFUSION INIT: CPT

## 2024-05-06 PROCEDURE — 2500000004 HC RX 250 GENERAL PHARMACY W/ HCPCS (ALT 636 FOR OP/ED): Performed by: NURSE PRACTITIONER

## 2024-05-06 PROCEDURE — 93005 ELECTROCARDIOGRAM TRACING: CPT

## 2024-05-06 PROCEDURE — 72125 CT NECK SPINE W/O DYE: CPT

## 2024-05-06 PROCEDURE — 85025 COMPLETE CBC W/AUTO DIFF WBC: CPT | Performed by: NURSE PRACTITIONER

## 2024-05-06 PROCEDURE — 84484 ASSAY OF TROPONIN QUANT: CPT | Performed by: NURSE PRACTITIONER

## 2024-05-06 PROCEDURE — 36415 COLL VENOUS BLD VENIPUNCTURE: CPT | Performed by: NURSE PRACTITIONER

## 2024-05-06 PROCEDURE — 70450 CT HEAD/BRAIN W/O DYE: CPT | Performed by: STUDENT IN AN ORGANIZED HEALTH CARE EDUCATION/TRAINING PROGRAM

## 2024-05-06 PROCEDURE — 70450 CT HEAD/BRAIN W/O DYE: CPT

## 2024-05-06 PROCEDURE — 71045 X-RAY EXAM CHEST 1 VIEW: CPT | Performed by: STUDENT IN AN ORGANIZED HEALTH CARE EDUCATION/TRAINING PROGRAM

## 2024-05-06 PROCEDURE — 81003 URINALYSIS AUTO W/O SCOPE: CPT | Performed by: NURSE PRACTITIONER

## 2024-05-06 PROCEDURE — 99285 EMERGENCY DEPT VISIT HI MDM: CPT | Mod: 25

## 2024-05-06 PROCEDURE — 84075 ASSAY ALKALINE PHOSPHATASE: CPT | Performed by: NURSE PRACTITIONER

## 2024-05-06 PROCEDURE — 72125 CT NECK SPINE W/O DYE: CPT | Performed by: STUDENT IN AN ORGANIZED HEALTH CARE EDUCATION/TRAINING PROGRAM

## 2024-05-06 RX ORDER — ACETAMINOPHEN 325 MG/1
975 TABLET ORAL ONCE
Status: COMPLETED | OUTPATIENT
Start: 2024-05-06 | End: 2024-05-06

## 2024-05-06 RX ADMIN — ACETAMINOPHEN 975 MG: 325 TABLET ORAL at 19:57

## 2024-05-06 RX ADMIN — SODIUM CHLORIDE 500 ML: 9 INJECTION, SOLUTION INTRAVENOUS at 19:10

## 2024-05-06 ASSESSMENT — COLUMBIA-SUICIDE SEVERITY RATING SCALE - C-SSRS
6. HAVE YOU EVER DONE ANYTHING, STARTED TO DO ANYTHING, OR PREPARED TO DO ANYTHING TO END YOUR LIFE?: NO
2. HAVE YOU ACTUALLY HAD ANY THOUGHTS OF KILLING YOURSELF?: NO
1. IN THE PAST MONTH, HAVE YOU WISHED YOU WERE DEAD OR WISHED YOU COULD GO TO SLEEP AND NOT WAKE UP?: NO

## 2024-05-06 ASSESSMENT — PAIN - FUNCTIONAL ASSESSMENT: PAIN_FUNCTIONAL_ASSESSMENT: 0-10

## 2024-05-06 ASSESSMENT — PAIN SCALES - GENERAL: PAINLEVEL_OUTOF10: 0 - NO PAIN

## 2024-05-07 ENCOUNTER — TELEPHONE (OUTPATIENT)
Dept: PRIMARY CARE | Facility: CLINIC | Age: 79
End: 2024-05-07
Payer: MEDICARE

## 2024-05-07 DIAGNOSIS — R26.89 IMBALANCE: Primary | ICD-10-CM

## 2024-05-07 LAB — HOLD SPECIMEN: NORMAL

## 2024-05-07 NOTE — ED PROVIDER NOTES
Chief Complaint   Patient presents with    Fall     Brought to ED per [facility] squad from [facility]. Pt had a 2 mile walk like normal but after started to feel dizzy. He then started to fall and a friend with him caught him on the way down. Pt struck his head on elevator. Denies LOC. He denies any pain. Is on Eliquis. EKG done at          Patient History    Past Medical History:   Diagnosis Date    Hypertension     Personal history of other diseases of the circulatory system     History of congestive heart failure      Past Surgical History:   Procedure Laterality Date    CARDIAC SURGERY      OTHER SURGICAL HISTORY  11/21/2019    Nose surgery    OTHER SURGICAL HISTORY  11/21/2019    Heart surgery      No family history on file.   Social History     Social History Narrative    Not on file      Allergies   Allergen Reactions    Penicillins Swelling and Unknown     As a child.    House Dust Unknown     Sinusitis.        PMH: Reviewed  PSH: Reviewed  Social History: Reviewed.   Allergies reviewed.     HPI: Og Cooper is a 78 y.o. male who presents to the ED today accompanied by daughter, via EMS, with complaints of head injury status post fall.  He states that he walked today which she normally does, however he walked an extra half mile.  He went home and showered.  He was heading downstairs to a meeting when he states that an earbud fell out of his ear.  He states he bent over to pick it up and started to feel dizzy.  His friend was standing next to him and broke his fall, but he did hit his head on the elevator door.  Denies LOC.  He does take blood thinner.  Denies chest pain or shortness of breath.  Denies vision changes.  Denies nausea or vomiting.      REVIEW OF SYSTEMS:  All other systems reviewed and negative except as listed in HPI.    PHYSICAL EXAM:    GENERAL: Vitals noted, no distress. Alert and oriented x 3. Non-toxic.      EENT: TMs clear. Posterior oropharynx unremarkable. EOMI, no nystagmus noted.  Small  abrasion right forehead.    NECK: Supple. No masses. No midline tenderness. No meningeal signs.     CARDIAC: Regular rate, rhythm. No murmurs rubs or gallops. No JVD.    PULMONARY: Lungs clear and equal bilaterally. No wheezes rales or rhonchi. No respiratory distress.     ABDOMEN: Soft, nondistended, and nontender. No peritoneal signs. Bowel sounds are present and normoactive in all 4 quadrants. No pulsatile masses.     EXTREMITIES: No peripheral edema.     SKIN: No rash. Warm, dry, and intact.     NEURO: No focal neurologic deficits.     Labs Reviewed   CBC WITH AUTO DIFFERENTIAL - Abnormal       Result Value    WBC 8.8      nRBC 0.0      RBC 4.70      Hemoglobin 12.8 (*)     Hematocrit 40.2 (*)     MCV 86      MCH 27.2      MCHC 31.8 (*)     RDW 14.4      Platelets 180      Neutrophils % 68.5      Immature Granulocytes %, Automated 0.2      Lymphocytes % 15.5      Monocytes % 8.8      Eosinophils % 6.5      Basophils % 0.5      Neutrophils Absolute 6.02 (*)     Immature Granulocytes Absolute, Automated 0.02      Lymphocytes Absolute 1.36      Monocytes Absolute 0.77      Eosinophils Absolute 0.57 (*)     Basophils Absolute 0.04     COMPREHENSIVE METABOLIC PANEL - Abnormal    Glucose 117 (*)     Sodium 137      Potassium 4.8      Chloride 106      Bicarbonate 26      Anion Gap 10      Urea Nitrogen 33 (*)     Creatinine 1.50 (*)     eGFR 48 (*)     Calcium 9.0      Albumin 4.1      Alkaline Phosphatase 101      Total Protein 6.5      AST 26      Bilirubin, Total 0.5      ALT 22     SERIAL TROPONIN-INITIAL - Abnormal    Troponin I, High Sensitivity 27 (*)     Narrative:     Less than 99th percentile of normal range cutoff-  Female and children under 18 years old <14 ng/L; Male <21 ng/L: Negative  Repeat testing should be performed if clinically indicated.     Female and children under 18 years old 14-50 ng/L; Male 21-50 ng/L:  Consistent with possible cardiac damage and possible increased clinical   risk. Serial  measurements may help to assess extent of myocardial damage.     >50 ng/L: Consistent with cardiac damage, increased clinical risk and  myocardial infarction. Serial measurements may help assess extent of   myocardial damage.      NOTE: Children less than 1 year old may have higher baseline troponin   levels and results should be interpreted in conjunction with the overall   clinical context.     NOTE: Troponin I testing is performed using a different   testing methodology at [facility] than at other   [facility]. Direct result comparisons should only   be made within the same method.   SERIAL TROPONIN, 1 HOUR - Abnormal    Troponin I, High Sensitivity 31 (*)     Narrative:     Less than 99th percentile of normal range cutoff-  Female and children under 18 years old <14 ng/L; Male <21 ng/L: Negative  Repeat testing should be performed if clinically indicated.     Female and children under 18 years old 14-50 ng/L; Male 21-50 ng/L:  Consistent with possible cardiac damage and possible increased clinical   risk. Serial measurements may help to assess extent of myocardial damage.     >50 ng/L: Consistent with cardiac damage, increased clinical risk and  myocardial infarction. Serial measurements may help assess extent of   myocardial damage.      NOTE: Children less than 1 year old may have higher baseline troponin   levels and results should be interpreted in conjunction with the overall   clinical context.     NOTE: Troponin I testing is performed using a different   testing methodology at [facility] than at other   [facility]. Direct result comparisons should only   be made within the same method.   URINALYSIS WITH REFLEX CULTURE AND MICROSCOPIC - Normal    Color, Urine Yellow      Appearance, Urine Clear      Specific Gravity, Urine 1.015      pH, Urine 5.0      Protein, Urine NEGATIVE      Glucose, Urine NEGATIVE      Blood, Urine NEGATIVE      Ketones, Urine NEGATIVE       Bilirubin, Urine NEGATIVE      Urobilinogen, Urine <2.0      Nitrite, Urine NEGATIVE      Leukocyte Esterase, Urine NEGATIVE     GREEN TOP    Extra Tube Hold for add-ons.     GREEN TOP    Extra Tube Hold for add-ons.     TROPONIN SERIES- (INITIAL, 1 HR)    Narrative:     The following orders were created for panel order Troponin I Series, High Sensitivity (0, 1 HR).  Procedure                               Abnormality         Status                     ---------                               -----------         ------                     Troponin I, High Sensiti...[[id number]]  Abnormal            Final result               Troponin, High Sensitivi...[[id number]]  Abnormal            Final result                 Please view results for these tests on the individual orders.   URINALYSIS WITH REFLEX CULTURE AND MICROSCOPIC    Narrative:     The following orders were created for panel order Urinalysis with Reflex Culture and Microscopic.  Procedure                               Abnormality         Status                     ---------                               -----------         ------                     Urinalysis with Reflex C...[[id number]]  Normal              Final result               Extra Urine Gray Tube[[id number]]                            In process                   Please view results for these tests on the individual orders.   EXTRA URINE GRAY TUBE        CT head wo IV contrast   Final Result   CT HEAD:   1. No acute intracranial abnormality or calvarial fracture.   2. Stable moderate burden of supratentorial chronic small vessel   ischemic disease.             CT CERVICAL SPINE:   1. No acute fracture or traumatic malalignment of the cervical spine.   2. Spondylotic changes of the cervical spine as detailed above.        MACRO:   None.        Signed by: Bob Lopez 5/6/2024 8:02 PM   Dictation workstation:   [id number]      CT cervical spine wo IV contrast   Final Result   CT HEAD:   1. No acute  intracranial abnormality or calvarial fracture.   2. Stable moderate burden of supratentorial chronic small vessel   ischemic disease.             CT CERVICAL SPINE:   1. No acute fracture or traumatic malalignment of the cervical spine.   2. Spondylotic changes of the cervical spine as detailed above.        MACRO:   None.        Signed by: Bob Lopez 5/6/2024 8:02 PM   Dictation workstation:   [id number]      XR chest 1 view   Final Result   1.  No evidence of acute cardiopulmonary process.             Signed by: Harrison Rangel 5/6/2024 7:52 PM   Dictation workstation:   [id number]           Medical Decision Making  Amount and/or Complexity of Data Reviewed  Labs: ordered.  Radiology: ordered.  ECG/medicine tests: ordered.       EKG interpreted by myself shows atrial paced rhythm with rate of 82. Normal axis. DC interval 160. QRS interval 156. QT interval 432. QTc interval 504. Non-specific ST-T wave changes. No acute ischemia or injury pattern.     ED COURSE: This patient was seen and examined by myself independently. He is placed on a continuous cardiac monitor with pulse oximetry monitoring. Old records and EKGs are obtained and reviewed. IV heplock is established, labs are obtained and noted above.  Sent for CT imaging of his head and C-spine which were both noted above, showing no acute intracranial abnormality or, or fracture.  Stable moderate burden of supratentorial chronic small vessel ischemic disease.  No acute fracture or traumatic malalignment of the cervical spine.  Spondylitic changes of the cervical spine.  Initial troponin is marginally elevated at 27 and repeated at 31.  Looking back at old records she has had marginally elevated troponins in the past.  He has no chest pain or shortness of breath.  Urinalysis is negative.  Creatinine slightly elevated 1.5, baseline 1.25.  Pacer interrogated, no arrhythmias noted.  Discussed with patient and family discharge home versus admission for  observation and at this point are opting for discharge home.  Recommend return to ED for any new or worsening symptoms.  Patient is discharged home in stable condition with computer instructions given.             Differential Diagnoses Considered: Head injury versus head bleed, cervical fracture versus cervical strain, fall, syncope versus vertigo    Chronic Medical Conditions Significantly Affecting Care: See above    External Records Reviewed: I reviewed recent and relevant outside records including: PCP notes, prior discharge summary, previous radiologic studies    Diagnostic testing considered: Blood, urine, EKG, chest x-ray, CT head and C-spine    Escalation of Care: Appropriate for outpatient management, considered observation/admission, patient/family declined          DIAGNOSTIC IMPRESSION: #1 head injury status post fall     Gaviota Mcclelland, HANNA-CNP  05/06/24 5225

## 2024-05-07 NOTE — TELEPHONE ENCOUNTER
Pt asking for PT/OT; having difficulty since his recent fall; did not really say what he wanted to work on.

## 2024-05-08 DIAGNOSIS — F41.9 ANXIETY: Primary | ICD-10-CM

## 2024-05-09 RX ORDER — BUPROPION HYDROCHLORIDE 200 MG/1
200 TABLET, EXTENDED RELEASE ORAL DAILY
Qty: 90 TABLET | Refills: 1 | Status: SHIPPED | OUTPATIENT
Start: 2024-05-09 | End: 2024-11-05

## 2024-05-09 RX ORDER — BUPROPION HYDROCHLORIDE 150 MG/1
150 TABLET ORAL EVERY MORNING
Qty: 90 TABLET | Refills: 1 | Status: SHIPPED | OUTPATIENT
Start: 2024-05-09 | End: 2024-05-11 | Stop reason: SDUPTHER

## 2024-05-11 ENCOUNTER — TELEPHONE (OUTPATIENT)
Dept: PRIMARY CARE | Facility: CLINIC | Age: 79
End: 2024-05-11
Payer: MEDICARE

## 2024-05-11 DIAGNOSIS — I50.22 CHRONIC SYSTOLIC CONGESTIVE HEART FAILURE (MULTI): ICD-10-CM

## 2024-05-11 DIAGNOSIS — E66.01 CLASS 2 SEVERE OBESITY WITH BODY MASS INDEX (BMI) OF 35 TO 39.9 WITH SERIOUS COMORBIDITY (MULTI): ICD-10-CM

## 2024-05-11 DIAGNOSIS — I25.10 CORONARY ARTERY DISEASE INVOLVING NATIVE CORONARY ARTERY OF NATIVE HEART WITHOUT ANGINA PECTORIS: ICD-10-CM

## 2024-05-11 DIAGNOSIS — F51.01 PRIMARY INSOMNIA: ICD-10-CM

## 2024-05-11 DIAGNOSIS — I10 PRIMARY HYPERTENSION: ICD-10-CM

## 2024-05-11 DIAGNOSIS — F41.9 ANXIETY: Primary | ICD-10-CM

## 2024-05-11 DIAGNOSIS — F90.9 ATTENTION DEFICIT HYPERACTIVITY DISORDER (ADHD), UNSPECIFIED ADHD TYPE: ICD-10-CM

## 2024-05-11 LAB
ATRIAL RATE: 82 BPM
P AXIS: 60 DEGREES
P OFFSET: 152 MS
P ONSET: 114 MS
PR INTERVAL: 160 MS
Q ONSET: 194 MS
QRS COUNT: 13 BEATS
QRS DURATION: 156 MS
QT INTERVAL: 432 MS
QTC CALCULATION(BAZETT): 504 MS
QTC FREDERICIA: 479 MS
R AXIS: 81 DEGREES
T AXIS: -46 DEGREES
T OFFSET: 410 MS
VENTRICULAR RATE: 82 BPM

## 2024-05-11 RX ORDER — SEMAGLUTIDE 0.25 MG/.5ML
0.25 INJECTION, SOLUTION SUBCUTANEOUS
Qty: 2 ML | Refills: 0 | Status: SHIPPED | OUTPATIENT
Start: 2024-05-11 | End: 2024-06-02

## 2024-05-11 RX ORDER — BUPROPION HYDROCHLORIDE 150 MG/1
150 TABLET ORAL EVERY MORNING
Qty: 90 TABLET | Refills: 1 | Status: SHIPPED | OUTPATIENT
Start: 2024-05-11 | End: 2024-11-07

## 2024-05-11 RX ORDER — LISINOPRIL 20 MG/1
20 TABLET ORAL DAILY
Qty: 100 TABLET | Refills: 3 | Status: SHIPPED | OUTPATIENT
Start: 2024-05-11 | End: 2025-06-15

## 2024-05-11 RX ORDER — TRAZODONE HYDROCHLORIDE 50 MG/1
25 TABLET ORAL NIGHTLY PRN
Qty: 30 TABLET | Refills: 1 | Status: SHIPPED | OUTPATIENT
Start: 2024-05-11 | End: 2025-05-11

## 2024-05-14 RX ORDER — SEMAGLUTIDE 0.25 MG/.5ML
0.25 INJECTION, SOLUTION SUBCUTANEOUS
Refills: 0 | OUTPATIENT
Start: 2024-05-19 | End: 2024-06-10

## 2024-05-15 DIAGNOSIS — J06.9 UPPER RESPIRATORY INFECTION WITH COUGH AND CONGESTION: ICD-10-CM

## 2024-05-15 RX ORDER — PREDNISONE 20 MG/1
TABLET ORAL
Qty: 18 TABLET | Refills: 0 | Status: SHIPPED | OUTPATIENT
Start: 2024-05-15

## 2024-05-20 RX ORDER — METHYLPHENIDATE HYDROCHLORIDE 5 MG/1
5 TABLET ORAL DAILY
Qty: 30 TABLET | Refills: 0 | Status: SHIPPED | OUTPATIENT
Start: 2024-05-20

## 2024-05-21 ENCOUNTER — APPOINTMENT (OUTPATIENT)
Dept: PHYSICAL THERAPY | Facility: CLINIC | Age: 79
End: 2024-05-21
Payer: MEDICARE

## 2024-05-24 ENCOUNTER — TELEPHONE (OUTPATIENT)
Dept: PRIMARY CARE | Facility: CLINIC | Age: 79
End: 2024-05-24
Payer: MEDICARE

## 2024-05-24 DIAGNOSIS — I10 ESSENTIAL HYPERTENSION: Primary | ICD-10-CM

## 2024-05-24 NOTE — TELEPHONE ENCOUNTER
Pt wants to know if he needs blood work since his lisinopril was increased. His daughter suggested it since she follows her father as well working in health care

## 2024-05-28 ENCOUNTER — LAB (OUTPATIENT)
Dept: LAB | Facility: LAB | Age: 79
End: 2024-05-28
Payer: MEDICARE

## 2024-05-28 DIAGNOSIS — I10 ESSENTIAL HYPERTENSION: ICD-10-CM

## 2024-05-28 LAB
ALBUMIN SERPL BCP-MCNC: 4 G/DL (ref 3.4–5)
ALP SERPL-CCNC: 86 U/L (ref 33–136)
ALT SERPL W P-5'-P-CCNC: 13 U/L (ref 10–52)
ANION GAP SERPL CALC-SCNC: 11 MMOL/L (ref 10–20)
AST SERPL W P-5'-P-CCNC: 19 U/L (ref 9–39)
BILIRUB SERPL-MCNC: 0.6 MG/DL (ref 0–1.2)
BUN SERPL-MCNC: 27 MG/DL (ref 6–23)
CALCIUM SERPL-MCNC: 9 MG/DL (ref 8.6–10.3)
CHLORIDE SERPL-SCNC: 105 MMOL/L (ref 98–107)
CO2 SERPL-SCNC: 27 MMOL/L (ref 21–32)
CREAT SERPL-MCNC: 1.58 MG/DL (ref 0.5–1.3)
EGFRCR SERPLBLD CKD-EPI 2021: 45 ML/MIN/1.73M*2
GLUCOSE SERPL-MCNC: 110 MG/DL (ref 74–99)
POTASSIUM SERPL-SCNC: 4.8 MMOL/L (ref 3.5–5.3)
PROT SERPL-MCNC: 6.2 G/DL (ref 6.4–8.2)
SODIUM SERPL-SCNC: 138 MMOL/L (ref 136–145)

## 2024-05-28 PROCEDURE — 36415 COLL VENOUS BLD VENIPUNCTURE: CPT

## 2024-05-28 PROCEDURE — RXMED WILLOW AMBULATORY MEDICATION CHARGE

## 2024-05-28 PROCEDURE — 80053 COMPREHEN METABOLIC PANEL: CPT

## 2024-05-29 ENCOUNTER — PHARMACY VISIT (OUTPATIENT)
Dept: PHARMACY | Facility: CLINIC | Age: 79
End: 2024-05-29
Payer: COMMERCIAL

## 2024-05-29 PROCEDURE — RXMED WILLOW AMBULATORY MEDICATION CHARGE

## 2024-06-18 ENCOUNTER — OFFICE VISIT (OUTPATIENT)
Dept: PRIMARY CARE | Facility: CLINIC | Age: 79
End: 2024-06-18
Payer: MEDICARE

## 2024-06-18 VITALS — DIASTOLIC BLOOD PRESSURE: 80 MMHG | HEART RATE: 75 BPM | SYSTOLIC BLOOD PRESSURE: 151 MMHG | TEMPERATURE: 97.7 F

## 2024-06-18 DIAGNOSIS — R09.89 SINUS COMPLAINT: ICD-10-CM

## 2024-06-18 PROCEDURE — 1160F RVW MEDS BY RX/DR IN RCRD: CPT

## 2024-06-18 PROCEDURE — 1036F TOBACCO NON-USER: CPT

## 2024-06-18 PROCEDURE — 1159F MED LIST DOCD IN RCRD: CPT

## 2024-06-18 PROCEDURE — 3079F DIAST BP 80-89 MM HG: CPT

## 2024-06-18 PROCEDURE — 1123F ACP DISCUSS/DSCN MKR DOCD: CPT

## 2024-06-18 PROCEDURE — 87635 SARS-COV-2 COVID-19 AMP PRB: CPT

## 2024-06-18 PROCEDURE — 99214 OFFICE O/P EST MOD 30 MIN: CPT

## 2024-06-18 PROCEDURE — 1157F ADVNC CARE PLAN IN RCRD: CPT

## 2024-06-18 PROCEDURE — 3077F SYST BP >= 140 MM HG: CPT

## 2024-06-18 ASSESSMENT — ENCOUNTER SYMPTOMS
HEADACHES: 1
DIFFICULTY URINATING: 0
SLEEP DISTURBANCE: 1
EYE ITCHING: 0
NUMBNESS: 0
NAUSEA: 0
FEVER: 0
VOMITING: 0
FATIGUE: 1
SORE THROAT: 0
WEAKNESS: 1
SINUS PRESSURE: 0
CHILLS: 0
ABDOMINAL PAIN: 0
SINUS PAIN: 0

## 2024-06-18 NOTE — PROGRESS NOTES
Subjective   Patient ID: Og Cooper is a 78 y.o. male who presents for sick and Heart issues.    HPI   Here today thinks he is sick, he feels fatigued but doesn't have any other symptoms. No fevers or chills, denies sore throat, cough or congestion.   Denies any pain.  Reports he suffers from depression and has an appointment with [facility] next week   Repots his sleep is not great, his wife passed in February and he has been struggling with that he does take 5 mg of melatonin and trazodone.   Pt reports he talked to his daughter who is a physician in [place] and she would like him COIVD tested, will order that today.     Review of Systems   Constitutional:  Positive for fatigue. Negative for chills and fever.   HENT:  Negative for ear pain, sinus pressure, sinus pain and sore throat.    Eyes:  Negative for itching.   Gastrointestinal:  Negative for abdominal pain, nausea and vomiting.   Genitourinary:  Negative for difficulty urinating and urgency.   Neurological:  Positive for weakness and headaches. Negative for syncope and numbness.   Psychiatric/Behavioral:  Positive for sleep disturbance.        Objective   /80 (Patient Position: Sitting)   Pulse 75   Temp 36.5 °C (97.7 °F)     Physical Exam  Vitals and nursing note reviewed.   Constitutional:       Appearance: Normal appearance.   HENT:      Right Ear: Tympanic membrane normal.      Nose: Nose normal.   Eyes:      Extraocular Movements: Extraocular movements intact.      Pupils: Pupils are equal, round, and reactive to light.   Cardiovascular:      Rate and Rhythm: Normal rate and regular rhythm.   Pulmonary:      Effort: Pulmonary effort is normal.      Breath sounds: Normal breath sounds.   Abdominal:      General: Abdomen is flat. Bowel sounds are normal.      Palpations: Abdomen is soft.   Musculoskeletal:      Cervical back: Normal range of motion.   Skin:     General: Skin is warm and dry.      Capillary Refill: Capillary refill takes less  than 2 seconds.   Neurological:      Mental Status: He is alert and oriented to person, place, and time.   Psychiatric:         Mood and Affect: Mood normal.         Behavior: Behavior normal.         Assessment/Plan   Problem List Items Addressed This Visit    None  Visit Diagnoses         Codes    Sinus complaint     R09.89    Relevant Orders    Sars-CoV-2 PCR

## 2024-06-19 DIAGNOSIS — F90.9 ATTENTION DEFICIT HYPERACTIVITY DISORDER (ADHD), UNSPECIFIED ADHD TYPE: ICD-10-CM

## 2024-06-19 RX ORDER — METHYLPHENIDATE HYDROCHLORIDE 5 MG/1
5 TABLET ORAL DAILY
Qty: 30 TABLET | Refills: 0 | Status: SHIPPED | OUTPATIENT
Start: 2024-06-19

## 2024-06-20 LAB — SARS-COV-2 RNA RESP QL NAA+PROBE: NOT DETECTED

## 2024-06-28 ENCOUNTER — APPOINTMENT (OUTPATIENT)
Dept: RADIOLOGY | Facility: HOSPITAL | Age: 79
End: 2024-06-28
Payer: MEDICARE

## 2024-06-28 ENCOUNTER — HOSPITAL ENCOUNTER (INPATIENT)
Facility: HOSPITAL | Age: 79
End: 2024-06-28
Attending: EMERGENCY MEDICINE | Admitting: STUDENT IN AN ORGANIZED HEALTH CARE EDUCATION/TRAINING PROGRAM
Payer: MEDICARE

## 2024-06-28 DIAGNOSIS — I50.43 CHF (CONGESTIVE HEART FAILURE), NYHA CLASS I, ACUTE ON CHRONIC, COMBINED (MULTI): Primary | ICD-10-CM

## 2024-06-28 DIAGNOSIS — I50.9 ACUTE ON CHRONIC CONGESTIVE HEART FAILURE, UNSPECIFIED HEART FAILURE TYPE (MULTI): ICD-10-CM

## 2024-06-28 LAB
ALBUMIN SERPL BCP-MCNC: 4 G/DL (ref 3.4–5)
ALP SERPL-CCNC: 99 U/L (ref 33–136)
ALT SERPL W P-5'-P-CCNC: 30 U/L (ref 10–52)
ANION GAP SERPL CALC-SCNC: 12 MMOL/L (ref 10–20)
AST SERPL W P-5'-P-CCNC: 33 U/L (ref 9–39)
BASOPHILS # BLD AUTO: 0.03 X10*3/UL (ref 0–0.1)
BASOPHILS NFR BLD AUTO: 0.3 %
BILIRUB SERPL-MCNC: 0.8 MG/DL (ref 0–1.2)
BNP SERPL-MCNC: 798 PG/ML (ref 0–99)
BUN SERPL-MCNC: 34 MG/DL (ref 6–23)
CALCIUM SERPL-MCNC: 8.9 MG/DL (ref 8.6–10.3)
CARDIAC TROPONIN I PNL SERPL HS: 58 NG/L (ref 0–20)
CARDIAC TROPONIN I PNL SERPL HS: 66 NG/L (ref 0–20)
CHLORIDE SERPL-SCNC: 109 MMOL/L (ref 98–107)
CO2 SERPL-SCNC: 21 MMOL/L (ref 21–32)
CREAT SERPL-MCNC: 1.52 MG/DL (ref 0.5–1.3)
EGFRCR SERPLBLD CKD-EPI 2021: 47 ML/MIN/1.73M*2
EOSINOPHIL # BLD AUTO: 0.35 X10*3/UL (ref 0–0.4)
EOSINOPHIL NFR BLD AUTO: 3.2 %
ERYTHROCYTE [DISTWIDTH] IN BLOOD BY AUTOMATED COUNT: 15.1 % (ref 11.5–14.5)
GLUCOSE SERPL-MCNC: 110 MG/DL (ref 74–99)
HCT VFR BLD AUTO: 40.3 % (ref 41–52)
HGB BLD-MCNC: 12.5 G/DL (ref 13.5–17.5)
IMM GRANULOCYTES # BLD AUTO: 0.03 X10*3/UL (ref 0–0.5)
IMM GRANULOCYTES NFR BLD AUTO: 0.3 % (ref 0–0.9)
LYMPHOCYTES # BLD AUTO: 1.01 X10*3/UL (ref 0.8–3)
LYMPHOCYTES NFR BLD AUTO: 9.3 %
MAGNESIUM SERPL-MCNC: 1.83 MG/DL (ref 1.6–2.4)
MCH RBC QN AUTO: 25.9 PG (ref 26–34)
MCHC RBC AUTO-ENTMCNC: 31 G/DL (ref 32–36)
MCV RBC AUTO: 84 FL (ref 80–100)
MONOCYTES # BLD AUTO: 0.94 X10*3/UL (ref 0.05–0.8)
MONOCYTES NFR BLD AUTO: 8.6 %
NEUTROPHILS # BLD AUTO: 8.52 X10*3/UL (ref 1.6–5.5)
NEUTROPHILS NFR BLD AUTO: 78.3 %
NRBC BLD-RTO: 0 /100 WBCS (ref 0–0)
PLATELET # BLD AUTO: 151 X10*3/UL (ref 150–450)
POTASSIUM SERPL-SCNC: 4.7 MMOL/L (ref 3.5–5.3)
PROT SERPL-MCNC: 6.3 G/DL (ref 6.4–8.2)
RBC # BLD AUTO: 4.82 X10*6/UL (ref 4.5–5.9)
SODIUM SERPL-SCNC: 137 MMOL/L (ref 136–145)
WBC # BLD AUTO: 10.9 X10*3/UL (ref 4.4–11.3)

## 2024-06-28 PROCEDURE — 2500000001 HC RX 250 WO HCPCS SELF ADMINISTERED DRUGS (ALT 637 FOR MEDICARE OP): Performed by: INTERNAL MEDICINE

## 2024-06-28 PROCEDURE — 84484 ASSAY OF TROPONIN QUANT: CPT | Mod: 91 | Performed by: NURSE PRACTITIONER

## 2024-06-28 PROCEDURE — 99223 1ST HOSP IP/OBS HIGH 75: CPT | Performed by: INTERNAL MEDICINE

## 2024-06-28 PROCEDURE — 99285 EMERGENCY DEPT VISIT HI MDM: CPT

## 2024-06-28 PROCEDURE — 83880 ASSAY OF NATRIURETIC PEPTIDE: CPT | Performed by: NURSE PRACTITIONER

## 2024-06-28 PROCEDURE — 71045 X-RAY EXAM CHEST 1 VIEW: CPT | Performed by: RADIOLOGY

## 2024-06-28 PROCEDURE — 84484 ASSAY OF TROPONIN QUANT: CPT | Performed by: NURSE PRACTITIONER

## 2024-06-28 PROCEDURE — 83735 ASSAY OF MAGNESIUM: CPT | Performed by: NURSE PRACTITIONER

## 2024-06-28 PROCEDURE — G0378 HOSPITAL OBSERVATION PER HR: HCPCS

## 2024-06-28 PROCEDURE — 2500000004 HC RX 250 GENERAL PHARMACY W/ HCPCS (ALT 636 FOR OP/ED): Performed by: NURSE PRACTITIONER

## 2024-06-28 PROCEDURE — 96374 THER/PROPH/DIAG INJ IV PUSH: CPT

## 2024-06-28 PROCEDURE — 71045 X-RAY EXAM CHEST 1 VIEW: CPT

## 2024-06-28 PROCEDURE — 85025 COMPLETE CBC W/AUTO DIFF WBC: CPT | Performed by: NURSE PRACTITIONER

## 2024-06-28 PROCEDURE — 36415 COLL VENOUS BLD VENIPUNCTURE: CPT | Performed by: NURSE PRACTITIONER

## 2024-06-28 PROCEDURE — 80053 COMPREHEN METABOLIC PANEL: CPT | Performed by: NURSE PRACTITIONER

## 2024-06-28 PROCEDURE — 94660 CPAP INITIATION&MGMT: CPT

## 2024-06-28 RX ORDER — ONDANSETRON 4 MG/1
4 TABLET, ORALLY DISINTEGRATING ORAL EVERY 8 HOURS PRN
Status: ACTIVE | OUTPATIENT
Start: 2024-06-28

## 2024-06-28 RX ORDER — FUROSEMIDE 10 MG/ML
40 INJECTION INTRAMUSCULAR; INTRAVENOUS ONCE
Status: COMPLETED | OUTPATIENT
Start: 2024-06-28 | End: 2024-06-28

## 2024-06-28 RX ORDER — ASPIRIN 81 MG/1
81 TABLET ORAL DAILY
Status: DISPENSED | OUTPATIENT
Start: 2024-06-29

## 2024-06-28 RX ORDER — ACETAMINOPHEN 650 MG/1
650 SUPPOSITORY RECTAL EVERY 4 HOURS PRN
Status: ACTIVE | OUTPATIENT
Start: 2024-06-28

## 2024-06-28 RX ORDER — TRAZODONE HYDROCHLORIDE 50 MG/1
25 TABLET ORAL NIGHTLY PRN
Status: DISPENSED | OUTPATIENT
Start: 2024-06-28

## 2024-06-28 RX ORDER — ATORVASTATIN CALCIUM 40 MG/1
40 TABLET, FILM COATED ORAL NIGHTLY
Status: DISPENSED | OUTPATIENT
Start: 2024-06-28

## 2024-06-28 RX ORDER — ACETAMINOPHEN 160 MG/5ML
650 SOLUTION ORAL EVERY 4 HOURS PRN
Status: ACTIVE | OUTPATIENT
Start: 2024-06-28

## 2024-06-28 RX ORDER — METOPROLOL SUCCINATE 25 MG/1
12.5 TABLET, EXTENDED RELEASE ORAL DAILY
Status: DISPENSED | OUTPATIENT
Start: 2024-06-29

## 2024-06-28 RX ORDER — METHYLPHENIDATE HYDROCHLORIDE 5 MG/1
2.5 TABLET ORAL DAILY
Status: ACTIVE | OUTPATIENT
Start: 2024-06-29

## 2024-06-28 RX ORDER — ONDANSETRON HYDROCHLORIDE 2 MG/ML
4 INJECTION, SOLUTION INTRAVENOUS EVERY 8 HOURS PRN
Status: ACTIVE | OUTPATIENT
Start: 2024-06-28

## 2024-06-28 RX ORDER — MAGNESIUM HYDROXIDE 2400 MG/10ML
10 SUSPENSION ORAL DAILY PRN
Status: ACTIVE | OUTPATIENT
Start: 2024-06-28

## 2024-06-28 RX ORDER — FUROSEMIDE 10 MG/ML
40 INJECTION INTRAMUSCULAR; INTRAVENOUS EVERY 12 HOURS
Status: DISCONTINUED | OUTPATIENT
Start: 2024-06-29 | End: 2024-06-29

## 2024-06-28 RX ORDER — AMLODIPINE BESYLATE 5 MG/1
5 TABLET ORAL DAILY
Status: DISPENSED | OUTPATIENT
Start: 2024-06-29

## 2024-06-28 RX ORDER — ACETAMINOPHEN 325 MG/1
650 TABLET ORAL EVERY 4 HOURS PRN
Status: ACTIVE | OUTPATIENT
Start: 2024-06-28

## 2024-06-28 RX ORDER — PANTOPRAZOLE SODIUM 20 MG/1
20 TABLET, DELAYED RELEASE ORAL
Status: DISPENSED | OUTPATIENT
Start: 2024-06-29

## 2024-06-28 RX ORDER — ACETAMINOPHEN 500 MG
5 TABLET ORAL NIGHTLY
Status: DISPENSED | OUTPATIENT
Start: 2024-06-28

## 2024-06-28 SDOH — SOCIAL STABILITY: SOCIAL INSECURITY: ABUSE: ADULT

## 2024-06-28 SDOH — SOCIAL STABILITY: SOCIAL INSECURITY: DO YOU FEEL UNSAFE GOING BACK TO THE PLACE WHERE YOU ARE LIVING?: NO

## 2024-06-28 SDOH — SOCIAL STABILITY: SOCIAL INSECURITY: HAVE YOU HAD ANY THOUGHTS OF HARMING ANYONE ELSE?: NO

## 2024-06-28 SDOH — SOCIAL STABILITY: SOCIAL INSECURITY: ARE YOU OR HAVE YOU BEEN THREATENED OR ABUSED PHYSICALLY, EMOTIONALLY, OR SEXUALLY BY ANYONE?: NO

## 2024-06-28 SDOH — SOCIAL STABILITY: SOCIAL INSECURITY: DO YOU FEEL ANYONE HAS EXPLOITED OR TAKEN ADVANTAGE OF YOU FINANCIALLY OR OF YOUR PERSONAL PROPERTY?: NO

## 2024-06-28 SDOH — SOCIAL STABILITY: SOCIAL INSECURITY: ARE THERE ANY APPARENT SIGNS OF INJURIES/BEHAVIORS THAT COULD BE RELATED TO ABUSE/NEGLECT?: NO

## 2024-06-28 SDOH — SOCIAL STABILITY: SOCIAL INSECURITY: HAVE YOU HAD THOUGHTS OF HARMING ANYONE ELSE?: NO

## 2024-06-28 SDOH — SOCIAL STABILITY: SOCIAL INSECURITY: DOES ANYONE TRY TO KEEP YOU FROM HAVING/CONTACTING OTHER FRIENDS OR DOING THINGS OUTSIDE YOUR HOME?: NO

## 2024-06-28 SDOH — SOCIAL STABILITY: SOCIAL INSECURITY: HAS ANYONE EVER THREATENED TO HURT YOUR FAMILY OR YOUR PETS?: NO

## 2024-06-28 ASSESSMENT — COGNITIVE AND FUNCTIONAL STATUS - GENERAL
PATIENT BASELINE BEDBOUND: NO
MOBILITY SCORE: 24
DAILY ACTIVITIY SCORE: 24

## 2024-06-28 ASSESSMENT — LIFESTYLE VARIABLES
HOW OFTEN DO YOU HAVE A DRINK CONTAINING ALCOHOL: NEVER
HOW MANY STANDARD DRINKS CONTAINING ALCOHOL DO YOU HAVE ON A TYPICAL DAY: PATIENT DOES NOT DRINK
HOW OFTEN DO YOU HAVE 6 OR MORE DRINKS ON ONE OCCASION: NEVER
SKIP TO QUESTIONS 9-10: 1
AUDIT-C TOTAL SCORE: 0
AUDIT-C TOTAL SCORE: 0

## 2024-06-28 ASSESSMENT — ACTIVITIES OF DAILY LIVING (ADL)
ADEQUATE_TO_COMPLETE_ADL: YES
FEEDING YOURSELF: INDEPENDENT
TOILETING: INDEPENDENT
BATHING: INDEPENDENT
WALKS IN HOME: INDEPENDENT
HEARING - RIGHT EAR: FUNCTIONAL
GROOMING: INDEPENDENT
PATIENT'S MEMORY ADEQUATE TO SAFELY COMPLETE DAILY ACTIVITIES?: YES
LACK_OF_TRANSPORTATION: NO
HEARING - LEFT EAR: FUNCTIONAL
DRESSING YOURSELF: INDEPENDENT
JUDGMENT_ADEQUATE_SAFELY_COMPLETE_DAILY_ACTIVITIES: YES

## 2024-06-28 ASSESSMENT — PATIENT HEALTH QUESTIONNAIRE - PHQ9
SUM OF ALL RESPONSES TO PHQ9 QUESTIONS 1 & 2: 0
1. LITTLE INTEREST OR PLEASURE IN DOING THINGS: NOT AT ALL
2. FEELING DOWN, DEPRESSED OR HOPELESS: NOT AT ALL

## 2024-06-28 ASSESSMENT — COLUMBIA-SUICIDE SEVERITY RATING SCALE - C-SSRS
6. HAVE YOU EVER DONE ANYTHING, STARTED TO DO ANYTHING, OR PREPARED TO DO ANYTHING TO END YOUR LIFE?: NO
2. HAVE YOU ACTUALLY HAD ANY THOUGHTS OF KILLING YOURSELF?: NO
2. HAVE YOU ACTUALLY HAD ANY THOUGHTS OF KILLING YOURSELF?: NO
1. IN THE PAST MONTH, HAVE YOU WISHED YOU WERE DEAD OR WISHED YOU COULD GO TO SLEEP AND NOT WAKE UP?: NO
1. IN THE PAST MONTH, HAVE YOU WISHED YOU WERE DEAD OR WISHED YOU COULD GO TO SLEEP AND NOT WAKE UP?: NO
6. HAVE YOU EVER DONE ANYTHING, STARTED TO DO ANYTHING, OR PREPARED TO DO ANYTHING TO END YOUR LIFE?: NO

## 2024-06-28 ASSESSMENT — PAIN - FUNCTIONAL ASSESSMENT
PAIN_FUNCTIONAL_ASSESSMENT: 0-10
PAIN_FUNCTIONAL_ASSESSMENT: 0-10

## 2024-06-28 ASSESSMENT — PAIN SCALES - GENERAL
PAINLEVEL_OUTOF10: 0 - NO PAIN

## 2024-06-28 NOTE — ED PROVIDER NOTES
Chief Complaint   Patient presents with    Shortness of Breath    Dizziness     Patient has had intermittent episodes of sob and dizziness for several days. Denies CP, fevers or cough. Unsure if sob and dizziness is related to potentially having a panic attack. EKG done at bedside.         Patient History    Past Medical History:   Diagnosis Date    Hypertension     Personal history of other diseases of the circulatory system     History of congestive heart failure      Past Surgical History:   Procedure Laterality Date    CARDIAC SURGERY      OTHER SURGICAL HISTORY  11/21/2019    Nose surgery    OTHER SURGICAL HISTORY  11/21/2019    Heart surgery      No family history on file.   Social History     Social History Narrative    Not on file      Allergies   Allergen Reactions    Penicillins Swelling and Unknown     As a child.    House Dust Unknown     Sinusitis.        PMH: Reviewed  PSH: Reviewed  Social History: Reviewed.   Allergies reviewed.     HPI: Og Cooper is a 78 y.o. male who presents to the ED today unaccompanied, via EMS, with complaints of shortness of breath.  Started several days ago.  Has intermittent dizziness.  No chest pain fevers or coughing.  Denies any leg pain or swelling.  Has history of anxiety, concerned that his symptoms may be related to a panic attack.  Denies room spinning sensation.  Denies nausea or vomiting.      REVIEW OF SYSTEMS:  All other systems reviewed and negative except as listed in HPI.    PHYSICAL EXAM:    GENERAL: Vitals noted, no distress. Alert and oriented x 3. Non-toxic.     EENT: TMs clear. Posterior oropharynx unremarkable. EOMI, no nystagmus noted. MMM.     NECK: Supple. No masses. No midline tenderness. No meningeal signs.     CARDIAC: Regular rate, rhythm. No murmurs rubs or gallops. No JVD.    PULMONARY: Lungs clear and equal bilaterally. No wheezes rales or rhonchi. No respiratory distress.     ABDOMEN: Soft, nondistended, and nontender. No peritoneal  signs. Bowel sounds are present and normoactive in all 4 quadrants. No pulsatile masses.     EXTREMITIES: Mild bilateral lower extremity peripheral edema.     SKIN: No rash. Warm, dry, and intact.     NEURO: No focal neurologic deficits.     Labs Reviewed   CBC WITH AUTO DIFFERENTIAL - Abnormal       Result Value    WBC 10.9      nRBC 0.0      RBC 4.82      Hemoglobin 12.5 (*)     Hematocrit 40.3 (*)     MCV 84      MCH 25.9 (*)     MCHC 31.0 (*)     RDW 15.1 (*)     Platelets 151      Neutrophils % 78.3      Immature Granulocytes %, Automated 0.3      Lymphocytes % 9.3      Monocytes % 8.6      Eosinophils % 3.2      Basophils % 0.3      Neutrophils Absolute 8.52 (*)     Immature Granulocytes Absolute, Automated 0.03      Lymphocytes Absolute 1.01      Monocytes Absolute 0.94 (*)     Eosinophils Absolute 0.35      Basophils Absolute 0.03     COMPREHENSIVE METABOLIC PANEL - Abnormal    Glucose 110 (*)     Sodium 137      Potassium 4.7      Chloride 109 (*)     Bicarbonate 21      Anion Gap 12      Urea Nitrogen 34 (*)     Creatinine 1.52 (*)     eGFR 47 (*)     Calcium 8.9      Albumin 4.0      Alkaline Phosphatase 99      Total Protein 6.3 (*)     AST 33      Bilirubin, Total 0.8      ALT 30     B-TYPE NATRIURETIC PEPTIDE - Abnormal     (*)     Narrative:        <100 pg/mL - Heart failure unlikely  100-299 pg/mL - Intermediate probability of acute heart                  failure exacerbation. Correlate with clinical                  context and patient history.    >=300 pg/mL - Heart Failure likely. Correlate with clinical                  context and patient history.    BNP testing is performed using different testing methodology at [facility] than at other [facility] hospitals. Direct result comparisons should only be made within the same method.      SERIAL TROPONIN-INITIAL - Abnormal    Troponin I, High Sensitivity 66 (*)     Narrative:     Less than 99th percentile of normal range  cutoff-  Female and children under 18 years old <14 ng/L; Male <21 ng/L: Negative  Repeat testing should be performed if clinically indicated.     Female and children under 18 years old 14-50 ng/L; Male 21-50 ng/L:  Consistent with possible cardiac damage and possible increased clinical   risk. Serial measurements may help to assess extent of myocardial damage.     >50 ng/L: Consistent with cardiac damage, increased clinical risk and  myocardial infarction. Serial measurements may help assess extent of   myocardial damage.      NOTE: Children less than 1 year old may have higher baseline troponin   levels and results should be interpreted in conjunction with the overall   clinical context.     NOTE: Troponin I testing is performed using a different   testing methodology at [facility] than at other   [facility]. Direct result comparisons should only   be made within the same method.   SERIAL TROPONIN, 1 HOUR - Abnormal    Troponin I, High Sensitivity 58 (*)     Narrative:     Less than 99th percentile of normal range cutoff-  Female and children under 18 years old <14 ng/L; Male <21 ng/L: Negative  Repeat testing should be performed if clinically indicated.     Female and children under 18 years old 14-50 ng/L; Male 21-50 ng/L:  Consistent with possible cardiac damage and possible increased clinical   risk. Serial measurements may help to assess extent of myocardial damage.     >50 ng/L: Consistent with cardiac damage, increased clinical risk and  myocardial infarction. Serial measurements may help assess extent of   myocardial damage.      NOTE: Children less than 1 year old may have higher baseline troponin   levels and results should be interpreted in conjunction with the overall   clinical context.     NOTE: Troponin I testing is performed using a different   testing methodology at [facility] than at other   [facility]. Direct result comparisons should only   be made within  the same method.   MAGNESIUM - Normal    Magnesium 1.83     TROPONIN SERIES- (INITIAL, 1 HR)    Narrative:     The following orders were created for panel order Troponin I Series, High Sensitivity (0, 1 HR).  Procedure                               Abnormality         Status                     ---------                               -----------         ------                     Troponin I, High Sensiti...[[id number]]  Abnormal            Final result               Troponin, High Sensitivi...[[id number]]  Abnormal            Final result                 Please view results for these tests on the individual orders.        XR chest 1 view   Final Result   Prominent basilar interstitial markings new from prior study   suggesting mild edema.        No discrete consolidation or effusion        Signed by: Gio Bennett 6/28/2024 5:12 PM   Dictation workstation:   [id number]           Medical Decision Making  Amount and/or Complexity of Data Reviewed  Labs: ordered.  Radiology: ordered.  ECG/medicine tests: ordered.     EKG interpreted by myself shows paced rhythm with rate of 92. Normal axis. AL interval 162. QRS interval 150. QT interval 408. QTc interval 504. No acute ischemia or injury pattern.      ED COURSE: This patient was seen and examined by myself and Dr. Stallings. He is placed on a continuous cardiac monitor with pulse oximetry monitoring. Old records and EKGs are obtained and reviewed. IV heplock is established, labs are obtained and noted above.  BNP is noted to be elevated at 798.  Troponin elevated at 66 and repeated at 58.  Mild anemia noted, at his baseline.  Creatinine at baseline at 1.58 today.  Chest x-ray shows prominent basilar interstitial markings, new from prior study suggesting mild edema.  No discrete consolidation or effusion.  He is given 40 mg of IV Lasix here in the ED.  Given his CHF exacerbation and elevated troponins, I do feel the patient requires hospital admission.  I spoke with the  patient's daughter, Bari (phone number in emergency contact list), who is a cardiologist, and she is in agreement for admission and the treatment.  Secure chat message sent to cardiology on-call, Dr. Bernard, and hospitalist, Dr. Mendoza, discussing patient condition.  Accepted for admission by hospitalist.             Differential Diagnoses Considered: CHF, MI/NSTEMI, CAD, COPD, asthma    Chronic Medical Conditions Significantly Affecting Care: See above    External Records Reviewed: I reviewed recent and relevant outside records including: PCP notes, prior discharge summary, previous radiologic studies    Diagnostic testing considered: Blood, chest x-ray, EKG    Escalation of Care: Appropriate for inpatient management    Discussion of Management with Other Providers: I discussed the patient/results with: admitting team, consultant/cardiology        DIAGNOSTIC IMPRESSION: #1 CHF exacerbation #2 elevated troponin     EUSEBIA Chan  06/28/24 2033       EUSEBIA Chan  06/28/24 2034

## 2024-06-29 LAB
ANION GAP SERPL CALC-SCNC: 11 MMOL/L (ref 10–20)
BUN SERPL-MCNC: 33 MG/DL (ref 6–23)
CALCIUM SERPL-MCNC: 8.9 MG/DL (ref 8.6–10.3)
CHLORIDE SERPL-SCNC: 106 MMOL/L (ref 98–107)
CO2 SERPL-SCNC: 24 MMOL/L (ref 21–32)
CREAT SERPL-MCNC: 1.56 MG/DL (ref 0.5–1.3)
EGFRCR SERPLBLD CKD-EPI 2021: 46 ML/MIN/1.73M*2
GLUCOSE SERPL-MCNC: 96 MG/DL (ref 74–99)
HOLD SPECIMEN: NORMAL
POTASSIUM SERPL-SCNC: 4.3 MMOL/L (ref 3.5–5.3)
SODIUM SERPL-SCNC: 137 MMOL/L (ref 136–145)

## 2024-06-29 PROCEDURE — 94761 N-INVAS EAR/PLS OXIMETRY MLT: CPT

## 2024-06-29 PROCEDURE — G0378 HOSPITAL OBSERVATION PER HR: HCPCS

## 2024-06-29 PROCEDURE — 94760 N-INVAS EAR/PLS OXIMETRY 1: CPT

## 2024-06-29 PROCEDURE — 36415 COLL VENOUS BLD VENIPUNCTURE: CPT | Performed by: INTERNAL MEDICINE

## 2024-06-29 PROCEDURE — 9420000001 HC RT PATIENT EDUCATION 5 MIN

## 2024-06-29 PROCEDURE — 2500000004 HC RX 250 GENERAL PHARMACY W/ HCPCS (ALT 636 FOR OP/ED): Performed by: INTERNAL MEDICINE

## 2024-06-29 PROCEDURE — 2500000004 HC RX 250 GENERAL PHARMACY W/ HCPCS (ALT 636 FOR OP/ED): Performed by: STUDENT IN AN ORGANIZED HEALTH CARE EDUCATION/TRAINING PROGRAM

## 2024-06-29 PROCEDURE — 2500000001 HC RX 250 WO HCPCS SELF ADMINISTERED DRUGS (ALT 637 FOR MEDICARE OP): Performed by: INTERNAL MEDICINE

## 2024-06-29 PROCEDURE — 99232 SBSQ HOSP IP/OBS MODERATE 35: CPT | Performed by: STUDENT IN AN ORGANIZED HEALTH CARE EDUCATION/TRAINING PROGRAM

## 2024-06-29 PROCEDURE — 94664 DEMO&/EVAL PT USE INHALER: CPT

## 2024-06-29 PROCEDURE — 80048 BASIC METABOLIC PNL TOTAL CA: CPT | Performed by: INTERNAL MEDICINE

## 2024-06-29 PROCEDURE — 2500000002 HC RX 250 W HCPCS SELF ADMINISTERED DRUGS (ALT 637 FOR MEDICARE OP, ALT 636 FOR OP/ED): Performed by: INTERNAL MEDICINE

## 2024-06-29 PROCEDURE — 94660 CPAP INITIATION&MGMT: CPT

## 2024-06-29 PROCEDURE — 2500000005 HC RX 250 GENERAL PHARMACY W/O HCPCS: Performed by: INTERNAL MEDICINE

## 2024-06-29 RX ORDER — ALPRAZOLAM 0.5 MG/1
0.5 TABLET ORAL ONCE
Status: COMPLETED | OUTPATIENT
Start: 2024-06-29 | End: 2024-06-29

## 2024-06-29 RX ORDER — FUROSEMIDE 10 MG/ML
20 INJECTION INTRAMUSCULAR; INTRAVENOUS EVERY 12 HOURS
Status: DISPENSED | OUTPATIENT
Start: 2024-06-29

## 2024-06-29 ASSESSMENT — PAIN - FUNCTIONAL ASSESSMENT
PAIN_FUNCTIONAL_ASSESSMENT: 0-10

## 2024-06-29 ASSESSMENT — PAIN SCALES - GENERAL
PAINLEVEL_OUTOF10: 0 - NO PAIN

## 2024-06-29 NOTE — CARE PLAN
The patient's goals for the shift include  have less SOB by end of shift    The clinical goals for the shift include patient will have SpO2 >90% through shift    Over the shift, patient rested comfortably. No complaints of pain or shortness of breath. Actively participated in plan of care, requesting C-PAP for nighttime use. Patient stood at side of bed and reported no dizziness. During admission questions, patient requested his wallet be taken to security and stored in the safe. Security was called and wallet was taken by them. Patient alert and oriented.

## 2024-06-29 NOTE — PROGRESS NOTES
Og Cooper is a 78 y.o. male on day 0 of admission presenting with CHF (congestive heart failure), NYHA class I, acute on chronic, combined (Multi).      Subjective          Patient seen at the bedside today.  He says he is feeling much better and slept well.      Objective     Last Recorded Vitals  /57 (BP Location: Right arm, Patient Position: Sitting)   Pulse 75   Temp 36.3 °C (97.4 °F) (Temporal)   Resp 16   Wt 95.6 kg (210 lb 11.2 oz)   SpO2 95%   Intake/Output last 3 Shifts:    Intake/Output Summary (Last 24 hours) at 6/29/2024 1513  Last data filed at 6/29/2024 1257  Gross per 24 hour   Intake 360 ml   Output 525 ml   Net -165 ml       Admission Weight  Weight: 95.7 kg (211 lb) (06/28/24 1632)    Daily Weight  06/28/24 : 95.6 kg (210 lb 11.2 oz)    Image Results  XR chest 1 view  Narrative: Interpreted By:  Gio Bennett,   STUDY:  XR CHEST 1 VIEW      INDICATION:  Signs/Symptoms:Chest Pain.      COMPARISON:  May 6, 2024      ACCESSION NUMBER(S):  [id number]      ORDERING CLINICIAN:  LAURA YOO      FINDINGS:  Cardiomegaly with pacemaker.      Prominent basilar interstitial markings new from prior study  suggesting mild edema.      No discrete consolidation or effusion      Impression: Prominent basilar interstitial markings new from prior study  suggesting mild edema.      No discrete consolidation or effusion      Signed by: Gio Bennett 6/28/2024 5:12 PM  Dictation workstation:   [id number]      Physical Exam  HENT:      Head: Normocephalic.   Eyes:      Pupils: Pupils are equal, round, and reactive to light.   Cardiovascular:      Rate and Rhythm: Normal rate and regular rhythm.      Pulses: Normal pulses.   Pulmonary:      Effort: Pulmonary effort is normal.   Abdominal:      Palpations: Abdomen is soft.   Musculoskeletal:         General: Normal range of motion.      Cervical back: Normal range of motion.   Skin:     General: Skin is dry.   Neurological:      General: No focal  deficit present.      Mental Status: He is alert.         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  Patient was found to have CHF exacerbation with pulmonary edema and elevated troponin levels.    Continue current current ongoing care  Continue IV Lasix twice daily will reduce the dose to 20 mg twice daily  Will check BMP and BNP tomorrow  Patient's creatinine is mildly creeping up.  His bicarb also slowly going up.  He does not seem to be fluid overloaded at the bedside  We are still awaiting echo.  Only on Monday  Continue to hold lisinopril.  Continue amlodipine  Continue Eliquis for anticoagulation and DVT prophylaxis         Total time spent 45 minutes       Tosha Mayen MD

## 2024-06-29 NOTE — H&P
History Of Present Illness  Og Cooper is a 78 y.o. male  Who presented to the emergency room for shortness of breath and dizziness.  On presentation, vital signs grossly within normal limits.  93% oxygen saturation on room air.  Pertinent findings on blood workup; troponin 66 with repeat of 58, , creatinine 1.52, BUN 34, and the rest is grossly within normal limits.  Chest x-ray showed findings consistent with pulmonary edema.  Patient was given in the emergency room 40 mg IV Lasix and then admitted to the medical service for further investigation and management.  Upon encounter now, patient reports feeling better.  He currently has his CPAP on.  He said that he has been feeling anxious along with shortness of breath the past week.  No chest pain or chest pressure or palpitations.  No leg edema.  His symptoms were progressively worsening in severity and frequency so he came to the emergency room.  Denies having any new cough.  No fever or chills.  No sore throat.  No runny nose.     ROS  10 systems were reviewed and were negative except for those noted in the history of present illness.    Past Medical History  Past Medical History:   Diagnosis Date    Hypertension     Personal history of other diseases of the circulatory system     History of congestive heart failure     Pertinent medical history also documented in my below narrative    Surgical History  Past Surgical History:   Procedure Laterality Date    CARDIAC SURGERY      OTHER SURGICAL HISTORY  11/21/2019    Nose surgery    OTHER SURGICAL HISTORY  11/21/2019    Heart surgery      Pertinent surgical history also documented in my below narrative    Social History  He reports that he quit smoking about 34 years ago. His smoking use included cigarettes. He has never used smokeless tobacco. He reports that he does not currently use alcohol. He reports that he does not use drugs.    Family History  No family history on file.     Allergies  Penicillins  and House dust    Medications Prior to Admission   Medication Sig Dispense Refill Last Dose    amLODIPine (Norvasc) 5 mg tablet Take 1 tablet (5 mg) by mouth once daily. 90 tablet 3 6/27/2024 at 9PM    apixaban (Eliquis) 5 mg tablet Take 1 tablet (5 mg) by mouth 2 times a day. 180 tablet 3 6/28/2024 at 9AM    aspirin 81 mg EC tablet Take 1 tablet (81 mg) by mouth once daily.   6/28/2024 at 9AM    atorvastatin (Lipitor) 40 mg tablet Take 1 tablet (40 mg) by mouth once daily at bedtime. 90 tablet 3 6/27/2024 at 9PM    buPROPion SR (Wellbutrin SR) 200 mg 12 hr tablet Take 1 tablet (200 mg) by mouth once daily. Do not crush, chew, or split. 90 tablet 1 6/28/2024 at 9AM    furosemide (Lasix) 20 mg tablet TAKE 1 TABLET BY MOUTH EVERY DAY AS NEEDED (Patient taking differently: Take 1 tablet (20 mg) by mouth once daily.) 90 tablet 3 6/28/2024 at 9AM    lisinopril 20 mg tablet Take 1 tablet (20 mg) by mouth once daily. 100 tablet 3 6/28/2024 at 9AM    melatonin 5 mg tablet,disintegrating Take 1 tablet by mouth once daily at bedtime.   6/27/2024 at 9PM    methylphenidate (Ritalin) 5 mg tablet Take 1 tablet (5 mg) by mouth once daily. (Patient taking differently: Take 0.5 tablets (2.5 mg) by mouth once daily.) 30 tablet 0 6/28/2024 at 9AM    metoprolol succinate XL (Toprol-XL) 25 mg 24 hr tablet Take 0.5 tablets (12.5 mg) by mouth once daily. Do not crush or chew. 45 tablet 3 6/28/2024 at 9AM    omeprazole (PriLOSEC) 20 mg DR capsule Take 1 capsule (20 mg) by mouth once daily. 90 capsule 3 6/28/2024 at 9AM    traZODone (Desyrel) 50 mg tablet Take 0.5 tablets (25 mg) by mouth as needed at bedtime for sleep. 30 tablet 1 6/27/2024 at 9PM    buPROPion XL (Wellbutrin XL) 150 mg 24 hr tablet Take 1 tablet (150 mg) by mouth once daily in the morning. Do not crush, chew, or split. 90 tablet 1     buPROPion XL (Wellbutrin XL) 300 mg 24 hr tablet TAKE 1 TABLET BY MOUTH ONCE A DAY TAKE WITH 150MG TAB FOR 450MG        "diphenhydrAMINE-acetaminophen (Tylenol PM)  mg per tablet Take 1 tablet by mouth as needed at bedtime for sleep.   Unknown    escitalopram (Lexapro) 10 mg tablet TAKE 1 TABLET BY MOUTH EVERY DAY 90 tablet 1     predniSONE (Deltasone) 20 mg tablet TAKE 3 TABLETS DAILY FOR 3 DAYS 2 TABS DAILY FOR 3 DAYS 1 TAB DAILY FOR 3 DAYS 18 tablet 0     semaglutide, weight loss, (Wegovy) 0.25 mg/0.5 mL pen injector Inject 0.25 mg under the skin 1 (one) time per week for 4 doses. 2 mL 0         Last Recorded Vitals  Blood pressure 141/58, pulse 86, temperature 36.7 °C (98.1 °F), temperature source Temporal, resp. rate 18, height 1.702 m (5' 7\"), weight 95.6 kg (210 lb 11.2 oz), SpO2 93%.    Physical Exam  Constitutional:       General: He is not in acute distress.     Appearance: He is obese. He is not ill-appearing.      Comments: Awake alert and oriented x3   HENT:      Mouth/Throat:      Pharynx: Oropharynx is clear.   Eyes:      Pupils: Pupils are equal, round, and reactive to light.   Cardiovascular:      Rate and Rhythm: Normal rate and regular rhythm.      Heart sounds: Murmur heard.   Pulmonary:      Breath sounds: No wheezing or rhonchi.      Comments: Distant breath sounds with diminished air entry at the lower lobe  Abdominal:      General: Abdomen is flat. Bowel sounds are normal. There is no distension.      Palpations: Abdomen is soft.      Tenderness: There is no abdominal tenderness.   Musculoskeletal:         General: No swelling.   Skin:     General: Skin is warm.   Neurological:      General: No focal deficit present.   Psychiatric:         Mood and Affect: Mood normal.         Behavior: Behavior normal.         Thought Content: Thought content normal.         Judgment: Judgment normal.           Relevant Results  Results for orders placed or performed during the hospital encounter of 06/28/24 (from the past 24 hour(s))   CBC and Auto Differential   Result Value Ref Range    WBC 10.9 4.4 - 11.3 x10*3/uL    " nRBC 0.0 0.0 - 0.0 /100 WBCs    RBC 4.82 4.50 - 5.90 x10*6/uL    Hemoglobin 12.5 (L) 13.5 - 17.5 g/dL    Hematocrit 40.3 (L) 41.0 - 52.0 %    MCV 84 80 - 100 fL    MCH 25.9 (L) 26.0 - 34.0 pg    MCHC 31.0 (L) 32.0 - 36.0 g/dL    RDW 15.1 (H) 11.5 - 14.5 %    Platelets 151 150 - 450 x10*3/uL    Neutrophils % 78.3 40.0 - 80.0 %    Immature Granulocytes %, Automated 0.3 0.0 - 0.9 %    Lymphocytes % 9.3 13.0 - 44.0 %    Monocytes % 8.6 2.0 - 10.0 %    Eosinophils % 3.2 0.0 - 6.0 %    Basophils % 0.3 0.0 - 2.0 %    Neutrophils Absolute 8.52 (H) 1.60 - 5.50 x10*3/uL    Immature Granulocytes Absolute, Automated 0.03 0.00 - 0.50 x10*3/uL    Lymphocytes Absolute 1.01 0.80 - 3.00 x10*3/uL    Monocytes Absolute 0.94 (H) 0.05 - 0.80 x10*3/uL    Eosinophils Absolute 0.35 0.00 - 0.40 x10*3/uL    Basophils Absolute 0.03 0.00 - 0.10 x10*3/uL   Comprehensive Metabolic Panel   Result Value Ref Range    Glucose 110 (H) 74 - 99 mg/dL    Sodium 137 136 - 145 mmol/L    Potassium 4.7 3.5 - 5.3 mmol/L    Chloride 109 (H) 98 - 107 mmol/L    Bicarbonate 21 21 - 32 mmol/L    Anion Gap 12 10 - 20 mmol/L    Urea Nitrogen 34 (H) 6 - 23 mg/dL    Creatinine 1.52 (H) 0.50 - 1.30 mg/dL    eGFR 47 (L) >60 mL/min/1.73m*2    Calcium 8.9 8.6 - 10.3 mg/dL    Albumin 4.0 3.4 - 5.0 g/dL    Alkaline Phosphatase 99 33 - 136 U/L    Total Protein 6.3 (L) 6.4 - 8.2 g/dL    AST 33 9 - 39 U/L    Bilirubin, Total 0.8 0.0 - 1.2 mg/dL    ALT 30 10 - 52 U/L   Magnesium   Result Value Ref Range    Magnesium 1.83 1.60 - 2.40 mg/dL   B-Type Natriuretic Peptide   Result Value Ref Range     (H) 0 - 99 pg/mL   Troponin I, High Sensitivity, Initial   Result Value Ref Range    Troponin I, High Sensitivity 66 (HH) 0 - 20 ng/L   Troponin, High Sensitivity, 1 Hour   Result Value Ref Range    Troponin I, High Sensitivity 58 (HH) 0 - 20 ng/L        XR chest 1 view    Result Date: 6/28/2024  Interpreted By:  Gio Bennett, STUDY: XR CHEST 1 VIEW   INDICATION:  Signs/Symptoms:Chest Pain.   COMPARISON: May 6, 2024   ACCESSION NUMBER(S): [id number]   ORDERING CLINICIAN: LAURA YOO   FINDINGS: Cardiomegaly with pacemaker.   Prominent basilar interstitial markings new from prior study suggesting mild edema.   No discrete consolidation or effusion       Prominent basilar interstitial markings new from prior study suggesting mild edema.   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  Patient was found to have CHF exacerbation with pulmonary edema and elevated troponin levels.    I will admit the patient to observation medical service with telemetry and vital signs monitoring.  I will continue giving Lasix 40 mg IV twice a day and monitor ins and outs closely.  Repeat BMP in the morning.  Troponin elevation is most likely in the setting of an underlying demand ischemia from the CHF exacerbation.  Patient not complaining from chest pain.  No cardiology services available at the hospital over the weekend.  If patient remains at the hospital till Monday, then would consult cardiology for input.  Until then, we will continue monitoring closely.  Since creatinine is slightly elevated on presentation, I will hold the lisinopril for now.  Continue the rest of home medications  Patient's daughter is a cardiologist who works out of state.  Her name is Benjamin [phone]  SCDs and Eliquis for DVT prophylaxis  Patient is full code      (This note was generated with  voice recognition software and may contain errors including spelling, grammar, syntax and misrecognition of what was dictated, that are not fully corrected)     Reji Person MD

## 2024-06-29 NOTE — PROGRESS NOTES
06/29/24 1528   Discharge Planning   Living Arrangements Alone   Support Systems Family members   Assistance Needed none   Type of Residence Private residence   Number of Stairs to Enter Residence 0   Number of Stairs Within Residence 0   Do you have animals or pets at home? No   Who is requesting discharge planning? Provider   Home or Post Acute Services Other (Comment)   Patient expects to be discharged to: return home with OP therapy at [facility]   Does the patient need discharge transport arranged? No     SW met with patient and reviewed with him our role with the discharge planning. Confirmed his address and insurance. He does live at [facility] -in the independent apartments. His PCP is Amy Montano and uses [facility] for pharmacy.He shared that he does see cardiology at [facility]. The MOON was signed on 6/28/24.  Patient said that he will return to his apartment and does not anticipate any needs. SW did call patients daughter Ammy and we spoke about him. He does have Cpap at home. She did ask about possible OP therapy at [facility] as he always worked out and being in here -he may need some therapy. Will ask when close to discharge. Patient will be here thru the weekend. CT to follow.

## 2024-06-30 ENCOUNTER — APPOINTMENT (OUTPATIENT)
Dept: CARDIOLOGY | Facility: HOSPITAL | Age: 79
End: 2024-06-30
Payer: MEDICARE

## 2024-06-30 VITALS
SYSTOLIC BLOOD PRESSURE: 122 MMHG | HEIGHT: 67 IN | WEIGHT: 210.7 LBS | BODY MASS INDEX: 33.07 KG/M2 | DIASTOLIC BLOOD PRESSURE: 67 MMHG | TEMPERATURE: 96.6 F | OXYGEN SATURATION: 93 % | RESPIRATION RATE: 18 BRPM | HEART RATE: 66 BPM

## 2024-06-30 LAB
ANION GAP SERPL CALC-SCNC: 11 MMOL/L (ref 10–20)
BNP SERPL-MCNC: 468 PG/ML (ref 0–99)
BODY SURFACE AREA: 2.13 M2
BUN SERPL-MCNC: 50 MG/DL (ref 6–23)
CALCIUM SERPL-MCNC: 8.7 MG/DL (ref 8.6–10.3)
CHLORIDE SERPL-SCNC: 108 MMOL/L (ref 98–107)
CO2 SERPL-SCNC: 24 MMOL/L (ref 21–32)
CREAT SERPL-MCNC: 1.92 MG/DL (ref 0.5–1.3)
EGFRCR SERPLBLD CKD-EPI 2021: 36 ML/MIN/1.73M*2
ERYTHROCYTE [DISTWIDTH] IN BLOOD BY AUTOMATED COUNT: 15.4 % (ref 11.5–14.5)
GLUCOSE SERPL-MCNC: 99 MG/DL (ref 74–99)
HCT VFR BLD AUTO: 37.8 % (ref 41–52)
HGB BLD-MCNC: 12 G/DL (ref 13.5–17.5)
MAGNESIUM SERPL-MCNC: 1.96 MG/DL (ref 1.6–2.4)
MCH RBC QN AUTO: 26.7 PG (ref 26–34)
MCHC RBC AUTO-ENTMCNC: 31.7 G/DL (ref 32–36)
MCV RBC AUTO: 84 FL (ref 80–100)
NRBC BLD-RTO: 0 /100 WBCS (ref 0–0)
PLATELET # BLD AUTO: 148 X10*3/UL (ref 150–450)
POTASSIUM SERPL-SCNC: 4 MMOL/L (ref 3.5–5.3)
RBC # BLD AUTO: 4.49 X10*6/UL (ref 4.5–5.9)
SODIUM SERPL-SCNC: 139 MMOL/L (ref 136–145)
WBC # BLD AUTO: 9.3 X10*3/UL (ref 4.4–11.3)

## 2024-06-30 PROCEDURE — 99232 SBSQ HOSP IP/OBS MODERATE 35: CPT | Performed by: STUDENT IN AN ORGANIZED HEALTH CARE EDUCATION/TRAINING PROGRAM

## 2024-06-30 PROCEDURE — 94760 N-INVAS EAR/PLS OXIMETRY 1: CPT

## 2024-06-30 PROCEDURE — 80048 BASIC METABOLIC PNL TOTAL CA: CPT | Performed by: STUDENT IN AN ORGANIZED HEALTH CARE EDUCATION/TRAINING PROGRAM

## 2024-06-30 PROCEDURE — 2500000005 HC RX 250 GENERAL PHARMACY W/O HCPCS: Performed by: INTERNAL MEDICINE

## 2024-06-30 PROCEDURE — 2500000002 HC RX 250 W HCPCS SELF ADMINISTERED DRUGS (ALT 637 FOR MEDICARE OP, ALT 636 FOR OP/ED): Performed by: INTERNAL MEDICINE

## 2024-06-30 PROCEDURE — 1200000002 HC GENERAL ROOM WITH TELEMETRY DAILY

## 2024-06-30 PROCEDURE — 36415 COLL VENOUS BLD VENIPUNCTURE: CPT | Performed by: STUDENT IN AN ORGANIZED HEALTH CARE EDUCATION/TRAINING PROGRAM

## 2024-06-30 PROCEDURE — 83880 ASSAY OF NATRIURETIC PEPTIDE: CPT | Performed by: STUDENT IN AN ORGANIZED HEALTH CARE EDUCATION/TRAINING PROGRAM

## 2024-06-30 PROCEDURE — 83735 ASSAY OF MAGNESIUM: CPT | Performed by: STUDENT IN AN ORGANIZED HEALTH CARE EDUCATION/TRAINING PROGRAM

## 2024-06-30 PROCEDURE — 93280 PM DEVICE PROGR EVAL DUAL: CPT

## 2024-06-30 PROCEDURE — 85027 COMPLETE CBC AUTOMATED: CPT | Performed by: STUDENT IN AN ORGANIZED HEALTH CARE EDUCATION/TRAINING PROGRAM

## 2024-06-30 PROCEDURE — 2500000001 HC RX 250 WO HCPCS SELF ADMINISTERED DRUGS (ALT 637 FOR MEDICARE OP): Performed by: INTERNAL MEDICINE

## 2024-06-30 ASSESSMENT — COGNITIVE AND FUNCTIONAL STATUS - GENERAL
CLIMB 3 TO 5 STEPS WITH RAILING: A LITTLE
DAILY ACTIVITIY SCORE: 24
MOBILITY SCORE: 23

## 2024-06-30 ASSESSMENT — PAIN - FUNCTIONAL ASSESSMENT
PAIN_FUNCTIONAL_ASSESSMENT: 0-10
PAIN_FUNCTIONAL_ASSESSMENT: 0-10

## 2024-06-30 ASSESSMENT — PAIN SCALES - GENERAL
PAINLEVEL_OUTOF10: 0 - NO PAIN
PAINLEVEL_OUTOF10: 0 - NO PAIN

## 2024-06-30 NOTE — PROGRESS NOTES
Og Cooper is a 78 y.o. male on day 0 of admission presenting with CHF (congestive heart failure), NYHA class I, acute on chronic, combined (Multi).      Subjective          Patient seen at the bedside today.  Is doing well and no specific complaints today     Objective     Last Recorded Vitals  /52   Pulse 71   Temp 36.7 °C (98.1 °F) (Temporal)   Resp 16   Wt 95.6 kg (210 lb 11.2 oz)   SpO2 96%   Intake/Output last 3 Shifts:    Intake/Output Summary (Last 24 hours) at 6/30/2024 1034  Last data filed at 6/30/2024 1017  Gross per 24 hour   Intake 570 ml   Output 125 ml   Net 445 ml       Admission Weight  Weight: 95.7 kg (211 lb) (06/28/24 1632)    Daily Weight  06/28/24 : 95.6 kg (210 lb 11.2 oz)    Image Results  XR chest 1 view  Narrative: Interpreted By:  Gio Bennett,   STUDY:  XR CHEST 1 VIEW      INDICATION:  Signs/Symptoms:Chest Pain.      COMPARISON:  May 6, 2024      ACCESSION NUMBER(S):  [id number]      ORDERING CLINICIAN:  LAURA YOO      FINDINGS:  Cardiomegaly with pacemaker.      Prominent basilar interstitial markings new from prior study  suggesting mild edema.      No discrete consolidation or effusion      Impression: Prominent basilar interstitial markings new from prior study  suggesting mild edema.      No discrete consolidation or effusion      Signed by: Gio Bennett 6/28/2024 5:12 PM  Dictation workstation:   [id number]      Physical Exam  HENT:      Head: Normocephalic.   Eyes:      Pupils: Pupils are equal, round, and reactive to light.   Cardiovascular:      Rate and Rhythm: Normal rate and regular rhythm.      Pulses: Normal pulses.   Pulmonary:      Effort: Pulmonary effort is normal.   Abdominal:      Palpations: Abdomen is soft.   Musculoskeletal:         General: Normal range of motion.      Cervical back: Normal range of motion.   Skin:     General: Skin is dry.   Neurological:      General: No focal deficit present.      Mental Status: He is alert.          Relevant Results             Scheduled medications  amLODIPine, 5 mg, oral, Daily  apixaban, 5 mg, oral, BID  aspirin, 81 mg, oral, Daily  atorvastatin, 40 mg, oral, Nightly  [Held by provider] furosemide, 20 mg, intravenous, q12h  melatonin, 5 mg, oral, Nightly  [Held by provider] methylphenidate, 2.5 mg, oral, Daily  metoprolol succinate XL, 12.5 mg, oral, Daily  oxygen, , inhalation, Continuous - Inhalation  pantoprazole, 20 mg, oral, Daily before breakfast      Continuous medications     PRN medications  PRN medications: acetaminophen **OR** acetaminophen **OR** acetaminophen, magnesium hydroxide, ondansetron ODT **OR** ondansetron, traZODone    Assessment/Plan      This is a pleasant 78-year-old obese male with a past medical history of 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  Patient was found to have CHF exacerbation with pulmonary edema and elevated troponin levels.    Patient has been having good diuresis.  He had a bump in creatinine.  Will hold Lasix for today  Awaiting echocardiogram  BNP improving  Cardiologist review tomorrow  Plan for DC on oral Lasix  Continue Eliquis for anticoagulation and DVT prophylaxis  [employer] investigation completed and no rhythm issues  Please call the daughter cardiologist Madi Coker for updates  Vitals have been stable so far  Plan to DC to [facility] once     DVT prophylaxis: Eliquis  GI prophylaxis: Protonix  Full code    Total time spent 45 minutes       Tosha Mayen MD

## 2024-06-30 NOTE — CARE PLAN
The patient's goals for the shift include      The clinical goals for the shift include patient will have decreased shortness of breath on exertion by end of shift    Over the shift, the patient reported to feeling better and being less short of breath.

## 2024-07-01 ENCOUNTER — APPOINTMENT (OUTPATIENT)
Dept: CARDIOLOGY | Facility: HOSPITAL | Age: 79
End: 2024-07-01
Payer: MEDICARE

## 2024-07-01 LAB
ANION GAP SERPL CALC-SCNC: 11 MMOL/L (ref 10–20)
AORTIC VALVE MEAN GRADIENT: 9 MMHG
AORTIC VALVE PEAK VELOCITY: 2.11 M/S
AV PEAK GRADIENT: 17.8 MMHG
AVA (PEAK VEL): 2.46 CM2
AVA (VTI): 2.39 CM2
BUN SERPL-MCNC: 59 MG/DL (ref 6–23)
CALCIUM SERPL-MCNC: 8.5 MG/DL (ref 8.6–10.3)
CHLORIDE SERPL-SCNC: 106 MMOL/L (ref 98–107)
CO2 SERPL-SCNC: 23 MMOL/L (ref 21–32)
CREAT SERPL-MCNC: 1.68 MG/DL (ref 0.5–1.3)
EGFRCR SERPLBLD CKD-EPI 2021: 42 ML/MIN/1.73M*2
EJECTION FRACTION APICAL 4 CHAMBER: 57.4
EJECTION FRACTION: 50 %
GLUCOSE SERPL-MCNC: 99 MG/DL (ref 74–99)
HOLD SPECIMEN: NORMAL
LEFT ATRIUM VOLUME AREA LENGTH INDEX BSA: 25 ML/M2
LEFT VENTRICLE INTERNAL DIMENSION DIASTOLE: 4.23 CM (ref 3.5–6)
LEFT VENTRICULAR OUTFLOW TRACT DIAMETER: 2.3 CM
LV EJECTION FRACTION BIPLANE: 50 %
MAGNESIUM SERPL-MCNC: 1.94 MG/DL (ref 1.6–2.4)
POTASSIUM SERPL-SCNC: 4.2 MMOL/L (ref 3.5–5.3)
RIGHT VENTRICLE PEAK SYSTOLIC PRESSURE: 46 MMHG
SODIUM SERPL-SCNC: 136 MMOL/L (ref 136–145)
TRICUSPID ANNULAR PLANE SYSTOLIC EXCURSION: 1.7 CM

## 2024-07-01 PROCEDURE — 1200000002 HC GENERAL ROOM WITH TELEMETRY DAILY

## 2024-07-01 PROCEDURE — 2500000004 HC RX 250 GENERAL PHARMACY W/ HCPCS (ALT 636 FOR OP/ED): Performed by: NURSE PRACTITIONER

## 2024-07-01 PROCEDURE — 2500000004 HC RX 250 GENERAL PHARMACY W/ HCPCS (ALT 636 FOR OP/ED): Performed by: INTERNAL MEDICINE

## 2024-07-01 PROCEDURE — 2500000001 HC RX 250 WO HCPCS SELF ADMINISTERED DRUGS (ALT 637 FOR MEDICARE OP): Performed by: INTERNAL MEDICINE

## 2024-07-01 PROCEDURE — 80048 BASIC METABOLIC PNL TOTAL CA: CPT | Performed by: STUDENT IN AN ORGANIZED HEALTH CARE EDUCATION/TRAINING PROGRAM

## 2024-07-01 PROCEDURE — 36415 COLL VENOUS BLD VENIPUNCTURE: CPT | Performed by: STUDENT IN AN ORGANIZED HEALTH CARE EDUCATION/TRAINING PROGRAM

## 2024-07-01 PROCEDURE — 94761 N-INVAS EAR/PLS OXIMETRY MLT: CPT

## 2024-07-01 PROCEDURE — 99233 SBSQ HOSP IP/OBS HIGH 50: CPT | Performed by: NURSE PRACTITIONER

## 2024-07-01 PROCEDURE — 99222 1ST HOSP IP/OBS MODERATE 55: CPT

## 2024-07-01 PROCEDURE — 93306 TTE W/DOPPLER COMPLETE: CPT

## 2024-07-01 PROCEDURE — 93306 TTE W/DOPPLER COMPLETE: CPT | Performed by: STUDENT IN AN ORGANIZED HEALTH CARE EDUCATION/TRAINING PROGRAM

## 2024-07-01 PROCEDURE — 83735 ASSAY OF MAGNESIUM: CPT | Performed by: STUDENT IN AN ORGANIZED HEALTH CARE EDUCATION/TRAINING PROGRAM

## 2024-07-01 PROCEDURE — 2500000002 HC RX 250 W HCPCS SELF ADMINISTERED DRUGS (ALT 637 FOR MEDICARE OP, ALT 636 FOR OP/ED): Performed by: INTERNAL MEDICINE

## 2024-07-01 PROCEDURE — 2500000005 HC RX 250 GENERAL PHARMACY W/O HCPCS: Performed by: INTERNAL MEDICINE

## 2024-07-01 PROCEDURE — 2500000001 HC RX 250 WO HCPCS SELF ADMINISTERED DRUGS (ALT 637 FOR MEDICARE OP): Performed by: NURSE PRACTITIONER

## 2024-07-01 RX ORDER — ALPRAZOLAM 0.5 MG/1
0.5 TABLET ORAL ONCE
Status: COMPLETED | OUTPATIENT
Start: 2024-07-01 | End: 2024-07-01

## 2024-07-01 RX ORDER — FUROSEMIDE 20 MG/1
20 TABLET ORAL DAILY
Status: DISCONTINUED | OUTPATIENT
Start: 2024-07-01 | End: 2024-07-02 | Stop reason: HOSPADM

## 2024-07-01 ASSESSMENT — COGNITIVE AND FUNCTIONAL STATUS - GENERAL
CLIMB 3 TO 5 STEPS WITH RAILING: A LITTLE
DAILY ACTIVITIY SCORE: 24
CLIMB 3 TO 5 STEPS WITH RAILING: A LITTLE
WALKING IN HOSPITAL ROOM: A LITTLE
WALKING IN HOSPITAL ROOM: A LITTLE
MOBILITY SCORE: 22
MOBILITY SCORE: 22

## 2024-07-01 ASSESSMENT — PAIN SCALES - GENERAL
PAINLEVEL_OUTOF10: 0 - NO PAIN
PAINLEVEL_OUTOF10: 0 - NO PAIN

## 2024-07-01 ASSESSMENT — PAIN - FUNCTIONAL ASSESSMENT: PAIN_FUNCTIONAL_ASSESSMENT: 0-10

## 2024-07-01 NOTE — PROGRESS NOTES
Spiritual Care Visit    Clinical Encounter Type  Visited With: Patient  Routine Visit: Follow-up  Continue Visiting: Yes  Referral To:     Tenriism Encounters  Tenriism Needs: Sacred text, Literature, Spiritual care brochure, Prayer              Patient Spiritual Care Encounters  Suffering Severity: Mild  Fear Level: Mild  Feelings of Loneliness: Good  Feelings of Hopelessness: Good  Coping: Often demonstrated  Social Interaction: Participates in daily activites    Family Spiritual Care Encounters  Family Coping: Open/discussion  Family Participation in Care: Consistently demonstrated  Family Support During Treatment: Consistently demonstrated  Caregiver-Patient Relationship: Not compromised         PC-7 Assessment (Level of Unmet Needs)  Existential Struggle: None  Spiritual/Tenriism Struggle: None  Legacy: None  Relationships: None  Fear of Death/Dying: Further assess  Values/Medical Decision Making: None  Ritual/Other: None  PC-7 Score: 0    SDAT (Spiritual Distress Assessment Tool)  Need for Life Balance: No evidence of unmet spiritual need  Need for Connection: No evidence of unmet spiritual need  Need for Values Acknowledgement: No evidence of unmet spiritual need  Need to Maintain Control: Some evidence of unmet spiritual need  Need to Maintain Identity: Some evidence of unmet spiritual need  SDAT Score: 2  SDAT Average Score: 0.4    Taxonomy  Intended Effects: Aligning care plan with patient's values, Build relationship of care and support, Convey a calming presence, Demonstrate caring and concern, Establish rapport and connectedness, Leydi affirmation, Helping someone feel comforted, Journeying with someone in the grief process, Lessen someone's feelings of isolation

## 2024-07-01 NOTE — PROGRESS NOTES
Og Cooper is a 79 y.o. male on day 1 of admission presenting with CHF (congestive heart failure), NYHA class I, acute on chronic, combined (Multi).      Subjective   Pt seen and examined, pt sitting on the side of the bed eating breakfast Pt denies any sob at this time  Pt states that he had a little trouble breathing last night with the bipap in feeling like he was being smothered and needed his mask adjusted but did ok once it was  Pt updated on plan of care for the day       Objective     Last Recorded Vitals  /55 (BP Location: Right arm, Patient Position: Lying)   Pulse 64   Temp 35.8 °C (96.5 °F) (Temporal)   Resp 16   Wt 95.6 kg (210 lb 11.2 oz)   SpO2 95%   Intake/Output last 3 Shifts:    Intake/Output Summary (Last 24 hours) at 7/1/2024 1023  Last data filed at 7/1/2024 0957  Gross per 24 hour   Intake 400 ml   Output --   Net 400 ml       Admission Weight  Weight: 95.7 kg (211 lb) (06/28/24 1632)    Daily Weight  06/28/24 : 95.6 kg (210 lb 11.2 oz)    Image Results  ECG 12 lead  Atrial-sensed ventricular-paced rhythm  Abnormal ECG  When compared with ECG of 28-JUN-2024 13:04, (unconfirmed)  Electronic ventricular pacemaker has replaced Sinus rhythm  Vent. rate has increased BY  32 BPM      Physical Exam  General Appearance: AAO x 3, not in acute distress  Skin: skin color pink, warm, and dry; no suspicious rashes or lesions  Eyes : PERRL, EOM's intact  ENT: mucous membranes pink and moist  Neck: normocephalic  Respiratory: lungs diminished sravan lower   Heart: regular rate and rhythm. telemetry shows Sinus rhythm  Abdomen: Nondistended, positive bowel sounds x4, soft,  nontender  Extremities: trace sravan lower ext edema noted   Peripheral pulses: normal x4 extremities  Neuro: alert, coherent and conversant, no focal motor deficits.      Relevant Results  Scheduled medications  amLODIPine, 5 mg, oral, Daily  apixaban, 5 mg, oral, BID  aspirin, 81 mg, oral, Daily  atorvastatin, 40 mg, oral,  Nightly  [Held by provider] furosemide, 20 mg, intravenous, q12h  melatonin, 5 mg, oral, Nightly  [Held by provider] methylphenidate, 2.5 mg, oral, Daily  metoprolol succinate XL, 12.5 mg, oral, Daily  oxygen, , inhalation, Continuous - Inhalation  pantoprazole, 20 mg, oral, Daily before breakfast      Continuous medications     PRN medications  PRN medications: acetaminophen **OR** acetaminophen **OR** acetaminophen, magnesium hydroxide, ondansetron ODT **OR** ondansetron, traZODone    Results for orders placed or performed during the hospital encounter of 06/28/24 (from the past 24 hour(s))   Basic Metabolic Panel   Result Value Ref Range    Glucose 99 74 - 99 mg/dL    Sodium 136 136 - 145 mmol/L    Potassium 4.2 3.5 - 5.3 mmol/L    Chloride 106 98 - 107 mmol/L    Bicarbonate 23 21 - 32 mmol/L    Anion Gap 11 10 - 20 mmol/L    Urea Nitrogen 59 (H) 6 - 23 mg/dL    Creatinine 1.68 (H) 0.50 - 1.30 mg/dL    eGFR 42 (L) >60 mL/min/1.73m*2    Calcium 8.5 (L) 8.6 - 10.3 mg/dL   Magnesium   Result Value Ref Range    Magnesium 1.94 1.60 - 2.40 mg/dL   Lavender Top   Result Value Ref Range    Extra Tube Hold for add-ons.            Assessment/Plan          Principal Problem:    CHF (congestive heart failure), NYHA class I, acute on chronic, combined (Multi)    Og Cooper is a 78 y.o. male  Who presented to the emergency room for shortness of breath and dizziness.  On presentation, vital signs grossly within normal limits.  93% oxygen saturation on room air.  Pertinent findings on blood workup; troponin 66 with repeat of 58, , creatinine 1.52, BUN 34, and the rest is grossly within normal limits.  Chest x-ray showed findings consistent with pulmonary edema.  Patient was given in the emergency room 40 mg IV Lasix and then admitted to the medical service for further investigation and management.  Upon encounter now, patient reports feeling better.  He currently has his CPAP on.  He said that he has been feeling  anxious along with shortness of breath the past week.  No chest pain or chest pressure or palpitations.  No leg edema.  His symptoms were progressively worsening in severity and frequency so he came to the emergency room.  Denies having any new cough.  No fever or chills.  No sore throat.  No runny nose.    #Acute on chronic combined systolic and diastolic heart failure  -pt had lasix held yesterday due to rise in creat, creat now downtrending 1.68  -will likely restart oral today, bnp down to 468  -awaiting echo  -cardiology consulted  -bp stable without lisinopril at this time     #SUNSHINE  -continue home cpap    #HTN  -continue amlodipine, metoprolol    #HX TAVR  -continue asa and eliquis             HANNA Kimball-CNP

## 2024-07-01 NOTE — CARE PLAN
The patient's goals for the shift include      The clinical goals for the shift include VSS throughout this shift      Problem: Fall/Injury  Goal: Not fall by end of shift  Outcome: Progressing  Goal: Be free from injury by end of the shift  Outcome: Progressing  Goal: Verbalize understanding of personal risk factors for fall in the hospital  Outcome: Progressing  Goal: Verbalize understanding of risk factor reduction measures to prevent injury from fall in the home  Outcome: Progressing  Goal: Use assistive devices by end of the shift  Outcome: Progressing  Goal: Pace activities to prevent fatigue by end of the shift  Outcome: Progressing     Problem: Heart Failure  Goal: Improved gas exchange this shift  Outcome: Progressing  Goal: Improved urinary output this shift  Outcome: Progressing  Goal: Reduction in peripheral edema within 24 hours  Outcome: Progressing  Goal: Report improvement of dyspnea/breathlessness this shift  Outcome: Progressing  Goal: Weight from fluid excess reduced over 2-3 days, then stabilize  Outcome: Progressing  Goal: Increase self care and/or family involvement in 24 hours  Outcome: Progressing     Problem: Pain - Adult  Goal: Verbalizes/displays adequate comfort level or baseline comfort level  Outcome: Progressing     Problem: Safety - Adult  Goal: Free from fall injury  Outcome: Progressing     Problem: Discharge Planning  Goal: Discharge to home or other facility with appropriate resources  Outcome: Progressing     Problem: Chronic Conditions and Co-morbidities  Goal: Patient's chronic conditions and co-morbidity symptoms are monitored and maintained or improved  Outcome: Progressing

## 2024-07-01 NOTE — PROGRESS NOTES
Pt reviewed in care rounds this morning. Pt not medically ready for discharge. ADOD is 24-48 hours. Plan is for Pt to discharge to home to apartment at [facility], no need for additional supports or services. SW to follow.

## 2024-07-01 NOTE — CONSULTS
CHF Clinic was consulted for CHF Clinic screening.    Notes, labs, flowsheets and medications reviewed in EMR.    Admitting Dx: CHF    Relevant Medications: Lisinopril, Lasix, Lipitor, Aspirin, Eliquis, Norvasc, Metoprolol Succinate  Cardiologist/PCP: [facility]    Last Echo: 7/1/24  EF: pending- previous echo 11/2023 was 40-45%  BNP: 6/28/24 798    Og is currently seeing Cardiology with [facility], but is wanting to switch to Cardiology locally. Will be getting scheduled with [facility]. Will schedule appointment with Og once discharged from the hospital.

## 2024-07-01 NOTE — CONSULTS
Inpatient consult to Cardiology  Consult performed by: HANNA Goyal-CNP  Consult ordered by: HANNA Kimball-CNP  Reason for consult: CHF      Cardiology Consult  [facility] [place]    Reason for Consult  CHF    History of Present Illness  This is a 79 y.o. 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  Follows with [facility] [place] cardiology.     ASSESSMENT AND PLAN  Heart failure with mildly reduced ejection fraction  ACC stage C  -Echocardiogram November 2023 showed mildly reduced LV systolic function with estimated LVEF of 40 to 45%, global LV hypokinesis and mild TR with , however echo December 2023 with [facility] showed normal LV systolic function with estimated LVEF of 55%.  -Repeat echo pending  -warm and dry on exam, he has some slight crackles in his bases.  Recommend furosemide 20 mg PO daily.   -If his echo comes back with any more reduced LVEF I will plan to initiate GDMT.  -He is asked expressed a desire to follow-up with local cardiology, I will message the office for hospital follow-up for him.    Aortic stenosis  -Status post SAVR and TAVR  -He still has a tremendous systolic murmur, I will appreciate the repeat echo for any changes.     Permanent atrial fibrillation  -EKG shows atrial sensing and ventricular paced rhythm with a heart rate of 92 bpm.  -Primary stroke prevention with Eliquis 5 mg twice a day  -Rate control with metoprolol 12.5 mg daily  -denies chest pain or palpations.    Subjective  A review of 12 systems was complete and negative with pertinent findings noted in the HPI.     Medical History  Past Medical History:   Diagnosis Date    Hypertension     Personal history of other  "diseases of the circulatory system     History of congestive heart failure       Surgical History  Past Surgical History:  No date: CARDIAC SURGERY  11/21/2019: OTHER SURGICAL HISTORY      Comment:  Nose surgery  11/21/2019: OTHER SURGICAL HISTORY      Comment:  Heart surgery    Family History  No family history on file.    Social History   reports that he quit smoking about 34 years ago. His smoking use included cigarettes. He has never used smokeless tobacco. He reports that he does not currently use alcohol. He reports that he does not use drugs.    Objective:  Cardiac Testing  See above    Notable Studies:   Imaging personally reviewed   Chest x-ray shows some mild edema    Telemetry Reviewed  V-paced in 60s.     Labs:   CMP:  Recent Labs     07/01/24  0421 06/30/24  0424 06/29/24  0345 06/28/24  1640 05/28/24  1450    139 137 137 138   K 4.2 4.0 4.3 4.7 4.8    108* 106 109* 105   CO2 23 24 24 21 27   ANIONGAP 11 11 11 12 11   BUN 59* 50* 33* 34* 27*   CREATININE 1.68* 1.92* 1.56* 1.52* 1.58*   EGFR 42* 36* 46* 47* 45*   MG 1.94 1.96  --  1.83  --      Recent Labs     06/28/24  1640 05/28/24  1450 05/06/24  1836 11/05/23  1736 11/05/23  1204   ALBUMIN 4.0 4.0 4.1 4.2 4.2   ALKPHOS 99 86 101 93 93   ALT 30 13 22 23 23   AST 33 19 26 25 25   BILITOT 0.8 0.6 0.5 0.6 0.8     CBC:  Recent Labs     06/30/24  0424 06/28/24 1640 05/06/24  1836 11/05/23  1204   WBC 9.3 10.9 8.8 8.0   HGB 12.0* 12.5* 12.8* 13.2*   HCT 37.8* 40.3* 40.2* 41.3   * 151 180 161   MCV 84 84 86 85     COAG:   Recent Labs     11/05/23  1204   INR 1.7*     ABO: No results for input(s): \"ABO\" in the last 76873 hours.  HEME/ENDO:  Recent Labs     11/06/23  0501 10/17/23  1341 12/22/21  1455   TSH  --  0.79  --    HGBA1C 5.8*  --  5.9*      CARDIAC:   Recent Labs     06/30/24  0424 06/28/24  1735 06/28/24  1640 05/06/24  2006 05/06/24  1836 11/05/23  1736 11/05/23  1204   TROPHS  --  58* 66* 31* 27*  --  31*   *  --  798*  " --   --  271*  --      Recent Labs     11/06/23  0501 11/05/23  1736 02/02/23  0930   CHOL 130 147 146   LDLF  --   --  82   HDL 30.0 34.0 31.0*   TRIG 125 127 165*       Intake & Output  Net IO Since Admission: 440 mL [07/01/24 1134]    Today's Weight:  Vitals:    06/28/24 2020   Weight: 95.6 kg (210 lb 11.2 oz)       Inpatient Medications:    Current Facility-Administered Medications:     acetaminophen (Tylenol) tablet 650 mg, 650 mg, oral, q4h PRN **OR** acetaminophen (Tylenol) oral liquid 650 mg, 650 mg, oral, q4h PRN **OR** acetaminophen (Tylenol) suppository 650 mg, 650 mg, rectal, q4h PRN, Reji Person MD    amLODIPine (Norvasc) tablet 5 mg, 5 mg, oral, Daily, Reji Person MD, 5 mg at 07/01/24 0845    apixaban (Eliquis) tablet 5 mg, 5 mg, oral, BID, Reji Person MD, 5 mg at 07/01/24 0845    aspirin EC tablet 81 mg, 81 mg, oral, Daily, Reji Person MD, 81 mg at 07/01/24 0845    atorvastatin (Lipitor) tablet 40 mg, 40 mg, oral, Nightly, Reji Person MD, 40 mg at 06/30/24 2048    furosemide (Lasix) tablet 20 mg, 20 mg, oral, Daily, Rhiannon Meyer, APRN-CNP, 20 mg at 07/01/24 1053    magnesium hydroxide (Milk of Magnesia) 2,400 mg/10 mL suspension 10 mL, 10 mL, oral, Daily PRN, Reji Person MD    melatonin tablet 5 mg, 5 mg, oral, Nightly, Reji Person MD, 5 mg at 06/30/24 2048    [Held by provider] methylphenidate (Ritalin) tablet 2.5 mg, 2.5 mg, oral, Daily, Reji Person MD    metoprolol succinate XL (Toprol-XL) 24 hr tablet 12.5 mg, 12.5 mg, oral, Daily, Reji Person MD, 12.5 mg at 07/01/24 0845    ondansetron ODT (Zofran-ODT) disintegrating tablet 4 mg, 4 mg, oral, q8h PRN **OR** ondansetron (Zofran) injection 4 mg, 4 mg, intravenous, q8h PRN, Reji Person MD, 4 mg at 07/01/24 0240    oxygen (O2) therapy, , inhalation, Continuous - Inhalation, Reji Person MD, 21 percent at 07/01/24 1110    pantoprazole (ProtoNix) EC tablet 20 mg, 20 mg, oral, Daily  before breakfast, Reji Person MD, 20 mg at 07/01/24 0845    traZODone (Desyrel) tablet 25 mg, 25 mg, oral, Nightly PRN, Reji Person MD, 25 mg at 06/30/24 2155     VITALS  Vitals:    07/01/24 1110   BP:    Pulse:    Resp:    Temp:    SpO2: 96%       PHYSICAL EXAM  Physical Exam  Vitals and nursing note reviewed.   Constitutional:       General: He is not in acute distress.  HENT:      Head: Normocephalic and atraumatic.      Mouth/Throat:      Mouth: Mucous membranes are moist.      Pharynx: Oropharynx is clear.   Eyes:      General: No scleral icterus.     Pupils: Pupils are equal, round, and reactive to light.   Cardiovascular:      Rate and Rhythm: Normal rate and regular rhythm.      Chest Wall: PMI is not displaced.      Pulses: Normal pulses.      Heart sounds: Murmur heard.      No systolic murmur is present.      No friction rub.   Pulmonary:      Effort: Pulmonary effort is normal.      Breath sounds: Normal breath sounds.   Abdominal:      General: Bowel sounds are normal. There is no distension.      Palpations: Abdomen is soft.      Tenderness: There is no abdominal tenderness.   Musculoskeletal:         General: No swelling. Normal range of motion.      Cervical back: Normal range of motion and neck supple.      Right lower leg: No edema.      Left lower leg: No edema.   Skin:     General: Skin is warm and dry.      Capillary Refill: Capillary refill takes less than 2 seconds.      Findings: No rash.   Neurological:      General: No focal deficit present.      Mental Status: He is alert.   Psychiatric:         Mood and Affect: Mood normal.         Behavior: Behavior normal.          Cardiology will continue to follow    Thank you for this interesting clinical case and allowing me to participate in the care of this patient.  Please reach me out if you have any questions or if you need any clarifications regarding the patient's care.    **Disclaimer: This note was dictated by speech  recognition, and every effort has been made to prevent any error in transcription, however minor errors may be present**  _________________________________________________________  Efraín Mcgregor, ALBERT, APRN-CNP, ACNPC-AG, CCRN  Division of Cardiovascular Medicine  [facility]  [facility]

## 2024-07-02 VITALS
BODY MASS INDEX: 34.08 KG/M2 | OXYGEN SATURATION: 95 % | HEART RATE: 91 BPM | WEIGHT: 217.15 LBS | RESPIRATION RATE: 20 BRPM | SYSTOLIC BLOOD PRESSURE: 147 MMHG | TEMPERATURE: 96.8 F | HEIGHT: 67 IN | DIASTOLIC BLOOD PRESSURE: 73 MMHG

## 2024-07-02 LAB
ANION GAP SERPL CALC-SCNC: 12 MMOL/L (ref 10–20)
BUN SERPL-MCNC: 59 MG/DL (ref 6–23)
CALCIUM SERPL-MCNC: 8.5 MG/DL (ref 8.6–10.3)
CHLORIDE SERPL-SCNC: 106 MMOL/L (ref 98–107)
CO2 SERPL-SCNC: 24 MMOL/L (ref 21–32)
CREAT SERPL-MCNC: 1.78 MG/DL (ref 0.5–1.3)
EGFRCR SERPLBLD CKD-EPI 2021: 39 ML/MIN/1.73M*2
ERYTHROCYTE [DISTWIDTH] IN BLOOD BY AUTOMATED COUNT: 15 % (ref 11.5–14.5)
GLUCOSE SERPL-MCNC: 102 MG/DL (ref 74–99)
HCT VFR BLD AUTO: 37.3 % (ref 41–52)
HGB BLD-MCNC: 11.8 G/DL (ref 13.5–17.5)
MCH RBC QN AUTO: 26.6 PG (ref 26–34)
MCHC RBC AUTO-ENTMCNC: 31.6 G/DL (ref 32–36)
MCV RBC AUTO: 84 FL (ref 80–100)
NRBC BLD-RTO: 0 /100 WBCS (ref 0–0)
PLATELET # BLD AUTO: 156 X10*3/UL (ref 150–450)
POTASSIUM SERPL-SCNC: 4.3 MMOL/L (ref 3.5–5.3)
RBC # BLD AUTO: 4.44 X10*6/UL (ref 4.5–5.9)
SODIUM SERPL-SCNC: 138 MMOL/L (ref 136–145)
WBC # BLD AUTO: 8.6 X10*3/UL (ref 4.4–11.3)

## 2024-07-02 PROCEDURE — 99222 1ST HOSP IP/OBS MODERATE 55: CPT

## 2024-07-02 PROCEDURE — 2500000001 HC RX 250 WO HCPCS SELF ADMINISTERED DRUGS (ALT 637 FOR MEDICARE OP): Performed by: INTERNAL MEDICINE

## 2024-07-02 PROCEDURE — 36415 COLL VENOUS BLD VENIPUNCTURE: CPT | Performed by: NURSE PRACTITIONER

## 2024-07-02 PROCEDURE — 85027 COMPLETE CBC AUTOMATED: CPT | Performed by: NURSE PRACTITIONER

## 2024-07-02 PROCEDURE — 2500000002 HC RX 250 W HCPCS SELF ADMINISTERED DRUGS (ALT 637 FOR MEDICARE OP, ALT 636 FOR OP/ED): Performed by: INTERNAL MEDICINE

## 2024-07-02 PROCEDURE — 2500000001 HC RX 250 WO HCPCS SELF ADMINISTERED DRUGS (ALT 637 FOR MEDICARE OP): Performed by: NURSE PRACTITIONER

## 2024-07-02 PROCEDURE — 82374 ASSAY BLOOD CARBON DIOXIDE: CPT | Performed by: NURSE PRACTITIONER

## 2024-07-02 PROCEDURE — 2500000005 HC RX 250 GENERAL PHARMACY W/O HCPCS: Performed by: INTERNAL MEDICINE

## 2024-07-02 PROCEDURE — 2500000002 HC RX 250 W HCPCS SELF ADMINISTERED DRUGS (ALT 637 FOR MEDICARE OP, ALT 636 FOR OP/ED): Performed by: NURSE PRACTITIONER

## 2024-07-02 PROCEDURE — 94664 DEMO&/EVAL PT USE INHALER: CPT

## 2024-07-02 PROCEDURE — 94640 AIRWAY INHALATION TREATMENT: CPT

## 2024-07-02 PROCEDURE — 94660 CPAP INITIATION&MGMT: CPT

## 2024-07-02 PROCEDURE — 9420000001 HC RT PATIENT EDUCATION 5 MIN

## 2024-07-02 PROCEDURE — 99239 HOSP IP/OBS DSCHRG MGMT >30: CPT | Performed by: NURSE PRACTITIONER

## 2024-07-02 PROCEDURE — 5A09357 ASSISTANCE WITH RESPIRATORY VENTILATION, LESS THAN 24 CONSECUTIVE HOURS, CONTINUOUS POSITIVE AIRWAY PRESSURE: ICD-10-PCS | Performed by: NURSE PRACTITIONER

## 2024-07-02 RX ORDER — IPRATROPIUM BROMIDE AND ALBUTEROL SULFATE 2.5; .5 MG/3ML; MG/3ML
3 SOLUTION RESPIRATORY (INHALATION)
Status: DISCONTINUED | OUTPATIENT
Start: 2024-07-02 | End: 2024-07-02 | Stop reason: HOSPADM

## 2024-07-02 RX ORDER — AMLODIPINE BESYLATE 5 MG/1
2.5 TABLET ORAL DAILY
Status: DISCONTINUED | OUTPATIENT
Start: 2024-07-03 | End: 2024-07-02

## 2024-07-02 RX ORDER — HYDROXYZINE HYDROCHLORIDE 10 MG/1
10 TABLET, FILM COATED ORAL ONCE
Status: COMPLETED | OUTPATIENT
Start: 2024-07-02 | End: 2024-07-02

## 2024-07-02 RX ORDER — AMLODIPINE BESYLATE 2.5 MG/1
2.5 TABLET ORAL DAILY
Start: 2024-07-03 | End: 2024-08-02

## 2024-07-02 RX ORDER — FUROSEMIDE 20 MG/1
20 TABLET ORAL DAILY
Qty: 30 TABLET | Refills: 0 | Status: SHIPPED | OUTPATIENT
Start: 2024-07-03 | End: 2024-08-02

## 2024-07-02 RX ORDER — AMLODIPINE BESYLATE 5 MG/1
2.5 TABLET ORAL DAILY
Status: DISCONTINUED | OUTPATIENT
Start: 2024-07-02 | End: 2024-07-02 | Stop reason: HOSPADM

## 2024-07-02 RX ORDER — ALPRAZOLAM 0.25 MG/1
0.25 TABLET ORAL ONCE AS NEEDED
Status: COMPLETED | OUTPATIENT
Start: 2024-07-02 | End: 2024-07-02

## 2024-07-02 ASSESSMENT — COGNITIVE AND FUNCTIONAL STATUS - GENERAL
MOBILITY SCORE: 23
DAILY ACTIVITIY SCORE: 24
CLIMB 3 TO 5 STEPS WITH RAILING: A LITTLE

## 2024-07-02 ASSESSMENT — PAIN - FUNCTIONAL ASSESSMENT: PAIN_FUNCTIONAL_ASSESSMENT: 0-10

## 2024-07-02 ASSESSMENT — PAIN SCALES - GENERAL: PAINLEVEL_OUTOF10: 0 - NO PAIN

## 2024-07-02 NOTE — CARE PLAN
Problem: Fall/Injury  Goal: Not fall by end of shift  Outcome: Progressing  Goal: Be free from injury by end of the shift  Outcome: Progressing  Goal: Verbalize understanding of personal risk factors for fall in the hospital  Outcome: Progressing  Goal: Verbalize understanding of risk factor reduction measures to prevent injury from fall in the home  Outcome: Progressing  Goal: Use assistive devices by end of the shift  Outcome: Progressing  Goal: Pace activities to prevent fatigue by end of the shift  Outcome: Progressing     Problem: Heart Failure  Goal: Improved gas exchange this shift  Outcome: Progressing  Goal: Improved urinary output this shift  Outcome: Progressing  Goal: Reduction in peripheral edema within 24 hours  Outcome: Progressing  Goal: Report improvement of dyspnea/breathlessness this shift  Outcome: Progressing  Goal: Weight from fluid excess reduced over 2-3 days, then stabilize  Outcome: Progressing  Goal: Increase self care and/or family involvement in 24 hours  Outcome: Progressing     Problem: Pain - Adult  Goal: Verbalizes/displays adequate comfort level or baseline comfort level  Outcome: Progressing     Problem: Safety - Adult  Goal: Free from fall injury  Outcome: Progressing     Problem: Discharge Planning  Goal: Discharge to home or other facility with appropriate resources  Outcome: Progressing     Problem: Chronic Conditions and Co-morbidities  Goal: Patient's chronic conditions and co-morbidity symptoms are monitored and maintained or improved  Outcome: Progressing   The patient's goals for the shift include      The clinical goals for the shift include no c/o shortness of breath    Over the shift, the patient did not make progress toward the following goals. Barriers to progression include . Recommendations to address these barriers include .

## 2024-07-02 NOTE — SIGNIFICANT EVENT
Patient very anxious.  Very upset about her health condition and upcoming transfer to another facility.  Will give Xanax 0.25 x 1.

## 2024-07-02 NOTE — PROGRESS NOTES
Cardiology Inpatient Progress Note  [facility] [place]    ASSESSMENT AND PLAN  This is a 79 y.o.  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  Follows with [facility] cardiology.      ASSESSMENT AND PLAN  Heart failure with mildly reduced ejection fraction  ACC stage C  -Echocardiogram shows mildly decreased LV systolic function with estimated LVEF of 50% with some reduced RV systolic function.  Moderate pulm vegetation  -warm and dry on exam. Continue furosemide 20 mg PO daily to maintain euvolemia.   -He is asked expressed a desire to follow-up with local cardiology, I will message the office for hospital follow-up for him.  -Net fluid loss -300 mL  -Strict I's and O's and daily weights     Aortic regurgitation  -Status post SAVR and TAVR  -He still has a tremendous systolic murmur.   -Echo shows moderate aortic regurgitation due to paravalvular leak     Permanent atrial fibrillation  -EKG shows atrial sensing and ventricular paced rhythm with a heart rate of 92 bpm.  -Primary stroke prevention with Eliquis 5 mg twice a day  -Rate control with metoprolol 12.5 mg daily  -denies chest pain or palpations.    Hypertension  -Lisinopril discontinued due to low trending blood pressure, 90s to 110s systolic, I will also decrease amlodipine to 2.5 mg daily.     -Care plan meeting was held with Ammy Melgar who is a  here at [facility] also the patient's daughter, and the patient's other daughter on the phone who is an electrophysiologist.  The plan is to continue soft diuresis with Lasix 20 mg p.o. today, repeat BMP tomorrow to trend serum creatinine, and transfer has been initiated including clinic  or structural cardiology to address the paravalvular leak in his TAVR valve.      Subjective  Denies complaints overnight    Objective:  Notable Studies:   Cardiac Testing:  See above    Imaging personally reviewed   Chest x-ray shows some mild edema      Telemetry Reviewed  V-paced in 60s.         Intake & Output  Net IO Since Admission: 205 mL [07/02/24 1027]    Today's Weight:  Vitals:    07/02/24 0630   Weight: 98.5 kg (217 lb 2.5 oz)       PHYSICAL EXAM  Physical Exam  Vitals and nursing note reviewed.   Constitutional:       General: He is not in acute distress.  HENT:      Head: Normocephalic and atraumatic.      Mouth/Throat:      Mouth: Mucous membranes are moist.      Pharynx: Oropharynx is clear.   Eyes:      General: No scleral icterus.     Pupils: Pupils are equal, round, and reactive to light.   Cardiovascular:      Rate and Rhythm: Normal rate and regular rhythm.      Chest Wall: PMI is not displaced.      Pulses: Normal pulses.      Heart sounds: Murmur heard.      Systolic murmur is present with a grade of 4/6.      No friction rub.   Pulmonary:      Effort: Pulmonary effort is normal.      Breath sounds: Normal breath sounds.   Abdominal:      General: Bowel sounds are normal. There is no distension.      Palpations: Abdomen is soft.      Tenderness: There is no abdominal tenderness.   Musculoskeletal:         General: No swelling. Normal range of motion.      Cervical back: Normal range of motion and neck supple.      Right lower leg: No edema.      Left lower leg: No edema.   Skin:     General: Skin is warm and dry.      Capillary Refill: Capillary refill takes less than 2 seconds.      Findings: No rash.   Neurological:      General: No focal deficit present.      Mental Status: He is alert.   Psychiatric:         Mood and Affect: Mood normal.         Behavior: Behavior normal.        Labs:     CMP:  Recent Labs     07/02/24  0450 07/01/24  0421 06/30/24  0424 06/29/24  0345 06/28/24  1640   NA  "138 136 139 137 137   K 4.3 4.2 4.0 4.3 4.7    106 108* 106 109*   CO2 24 23 24 24 21   ANIONGAP 12 11 11 11 12   BUN 59* 59* 50* 33* 34*   CREATININE 1.78* 1.68* 1.92* 1.56* 1.52*   EGFR 39* 42* 36* 46* 47*   MG  --  1.94 1.96  --  1.83     Recent Labs     06/28/24 1640 05/28/24  1450 05/06/24  1836 11/05/23 1736 11/05/23  1204   ALBUMIN 4.0 4.0 4.1 4.2 4.2   ALKPHOS 99 86 101 93 93   ALT 30 13 22 23 23   AST 33 19 26 25 25   BILITOT 0.8 0.6 0.5 0.6 0.8     CBC:  Recent Labs     07/02/24  0450 06/30/24 0424 06/28/24 1640 05/06/24 1836 11/05/23  1204   WBC 8.6 9.3 10.9 8.8 8.0   HGB 11.8* 12.0* 12.5* 12.8* 13.2*   HCT 37.3* 37.8* 40.3* 40.2* 41.3    148* 151 180 161   MCV 84 84 84 86 85     COAG:   Recent Labs     11/05/23  1204   INR 1.7*     ABO: No results for input(s): \"ABO\" in the last 26583 hours.  HEME/ENDO:  Recent Labs     11/06/23  0501 10/17/23  1341 12/22/21  1455   TSH  --  0.79  --    HGBA1C 5.8*  --  5.9*      CARDIAC:   Recent Labs     06/30/24  0424 06/28/24  1735 06/28/24 1640 05/06/24  2006 05/06/24  1836 11/05/23  1736 11/05/23  1204   TROPHS  --  58* 66* 31* 27*  --  31*   *  --  798*  --   --  271*  --      Recent Labs     11/06/23  0501 11/05/23 1736 02/02/23  0930   CHOL 130 147 146   LDLF  --   --  82   HDL 30.0 34.0 31.0*   TRIG 125 127 165*       Inpatient Medications:    Current Facility-Administered Medications:     acetaminophen (Tylenol) tablet 650 mg, 650 mg, oral, q4h PRN **OR** acetaminophen (Tylenol) oral liquid 650 mg, 650 mg, oral, q4h PRN **OR** acetaminophen (Tylenol) suppository 650 mg, 650 mg, rectal, q4h PRN, Reji Person MD    amLODIPine (Norvasc) tablet 2.5 mg, 2.5 mg, oral, Daily, Rhiannon Meyer, APRN-CNP, 2.5 mg at 07/02/24 1014    apixaban (Eliquis) tablet 5 mg, 5 mg, oral, BID, Reji Person MD, 5 mg at 07/02/24 1008    atorvastatin (Lipitor) tablet 40 mg, 40 mg, oral, Nightly, Reji Person MD, 40 mg at 07/01/24 2020    " furosemide (Lasix) tablet 20 mg, 20 mg, oral, Daily, EUSEBIA Kimball, 20 mg at 07/02/24 1008    magnesium hydroxide (Milk of Magnesia) 2,400 mg/10 mL suspension 10 mL, 10 mL, oral, Daily PRN, Reji Person MD    melatonin tablet 5 mg, 5 mg, oral, Nightly, Reji Person MD, 5 mg at 07/01/24 2020    [Held by provider] methylphenidate (Ritalin) tablet 2.5 mg, 2.5 mg, oral, Daily, Reji Person MD    metoprolol succinate XL (Toprol-XL) 24 hr tablet 12.5 mg, 12.5 mg, oral, Daily, Reji Person MD, 12.5 mg at 07/02/24 1008    ondansetron ODT (Zofran-ODT) disintegrating tablet 4 mg, 4 mg, oral, q8h PRN, 4 mg at 07/02/24 0227 **OR** ondansetron (Zofran) injection 4 mg, 4 mg, intravenous, q8h PRN, Reji Person MD, 4 mg at 07/01/24 0240    oxygen (O2) therapy, , inhalation, Continuous - Inhalation, Reji Person MD, 21 percent at 07/02/24 0600    pantoprazole (ProtoNix) EC tablet 20 mg, 20 mg, oral, Daily before breakfast, Reji Person MD, 20 mg at 07/02/24 1008    perflutren protein A microsphere (Optison) injection 0.5 mL, 0.5 mL, intravenous, Once in imaging, EUSEBIA Kimball    sulfur hexafluoride microsphr (Lumason) injection 24.28 mg, 2 mL, intravenous, Once in imaging, EUSEBIA Kimball    traZODone (Desyrel) tablet 25 mg, 25 mg, oral, Nightly PRN, Reji Person MD, 25 mg at 07/01/24 2323     VITALS  Vitals:    07/02/24 0727   BP: 115/61   Pulse: 62   Resp: 18   Temp: 35.9 °C (96.6 °F)   SpO2: 95%       Cardiology will continue to follow.     Thank you for this interesting clinical case and allowing me to participate in the care of this patient.  Please reach me out if you have any questions or if you need any clarifications regarding the patient's care.    **Disclaimer: This note was dictated by speech recognition, and every effort has been made to prevent any error in transcription, however minor errors may be  present**  _________________________________________________________  Efraín Mcgregor, ALBERT, APRN-CNP, ACNPC-AG, CCRN  Division of Cardiovascular Medicine  [place] [facility] [place]  [facility]

## 2024-07-02 NOTE — DISCHARGE SUMMARY
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     Discharge Meds     Your medication list        CHANGE how you take these medications        Instructions Last Dose Given Next Dose Due   amLODIPine 2.5 mg tablet  Commonly known as: Norvasc  Start taking on: July 3, 2024  What changed:   medication strength  how much to take      Take 1 tablet (2.5 mg) by mouth once daily.       buPROPion  mg 24 hr tablet  Commonly known as: Wellbutrin XL  What changed: Another medication with the same name was removed. Continue taking this medication, and follow the directions you see here.           buPROPion  mg 12 hr tablet  Commonly known as: Wellbutrin SR  What changed: Another medication with the same name was removed. Continue taking this medication, and follow the directions you see here.      Take 1 tablet (200 mg) by mouth once daily. Do not crush, chew, or split.              CONTINUE taking these medications        Instructions Last Dose Given Next Dose Due   aspirin 81 mg EC tablet           atorvastatin 40 mg tablet  Commonly known as: Lipitor      Take 1 tablet (40 mg) by mouth once daily at bedtime.       diphenhydrAMINE-acetaminophen  mg per tablet  Commonly known as: Tylenol PM           Eliquis 5 mg tablet  Generic drug: apixaban      Take 1 tablet (5 mg) by mouth 2 times a day.       furosemide 20 mg tablet  Commonly known as: Lasix  Start taking on: July 3, 2024      Take 1 tablet (20 mg) by mouth once daily.       lisinopril 20 mg tablet      Take 1 tablet (20 mg) by mouth once daily.       melatonin 5 mg tablet,disintegrating           methylphenidate 5 mg tablet  Commonly known as: Ritalin      Take 1 tablet (5 mg) by mouth once daily.       metoprolol succinate XL 25 mg 24 hr tablet  Commonly known as: Toprol-XL      Take 0.5 tablets (12.5 mg) by mouth once daily.  Do not crush or chew.       omeprazole 20 mg DR capsule  Commonly known as: PriLOSEC      Take 1 capsule (20 mg) by mouth once daily.       traZODone 50 mg tablet  Commonly known as: Desyrel      Take 0.5 tablets (25 mg) by mouth as needed at bedtime for sleep.              STOP taking these medications      escitalopram 10 mg tablet  Commonly known as: Lexapro        predniSONE 20 mg tablet  Commonly known as: Deltasone        Wegovy 0.25 mg/0.5 mL pen injector  Generic drug: semaglutide (weight loss)                  Where to Get Your Medications        These medications were sent to [facility]/pharmacy #[id number] - [place] [address]      Phone: [phone]   furosemide 20 mg tablet       Information about where to get these medications is not yet available    Ask your nurse or doctor about these medications  amLODIPine 2.5 mg tablet         Test Results Pending At Discharge  Pending Labs       No current pending labs.            Hospital Course   Og Cooper is a 78 y.o. male  Who presented to the emergency room for shortness of breath and dizziness.  On presentation, vital signs grossly within normal limits.  93% oxygen saturation on room air.  Pertinent findings on blood workup; troponin 66 with repeat of 58, , creatinine 1.52, BUN 34, and the rest is grossly within normal limits.  Chest x-ray showed findings consistent with pulmonary edema.  Patient was given in the emergency room 40 mg IV Lasix and then admitted to the medical service for further investigation and management.  Upon encounter now, patient reports feeling better.  He currently has his CPAP on.  He said that he has been feeling anxious along with shortness of breath the past week.  No chest pain or chest pressure or palpitations.  No leg edema.  His symptoms were progressively worsening in severity and frequency so he came to the emergency room.  Denies having any new cough.  No fever or  chills.  No sore throat.  No runny nose. Pt was started on iv lasix due to acute on chronic systolic and diastolic heart failure and this was handled lightly due to his creatinine and it bumping higher than his baseline, this was then changed over to oral. Pt was seen by cardiology, and pt had echocardiogram done and he was found to have paravalvular leak in his TAVR valve. Pt was requested to transfer to [facility] per family request.     Pertinent Physical Exam At Time of Discharge  Physical Exam    General Appearance: AAO x 3, not in acute distress  Skin: skin color pink, warm, and dry; no suspicious rashes or lesions  Eyes : PERRL, EOM's intact  ENT: mucous membranes pink and moist  Neck: normocephalic  Respiratory: lungs diminished sravan lower   Heart: regular rate and rhythm. telemetry shows Sinus rhythm + murmur   Abdomen: Nondistended, positive bowel sounds x4, soft,  nontender  Extremities: trace sravan lower ext edema noted   Peripheral pulses: normal x4 extremities  Neuro: alert, coherent and conversant, no focal motor deficits.    Outpatient Follow-Up  Future Appointments   Date Time Provider Department Center   7/16/2024 11:00 AM Amy Mart DO [email or web address] Nam Meyer, HANNA-CNP

## 2024-07-02 NOTE — PROGRESS NOTES
Pt reviewed during Care Rounds today and will be transferred to [facility]. Per daughter, the transfer can be initiated to [facility] Transfer Line [phone] and a bed is available.  Rhiannon/NP and Charge Nurse/Lindsey updated.  IM reviewed with pt today with no questions/concerns- copy added to pt's chart, another copy provided to daughter. Nursing to coordinate transportation. No further needs identified.       JAMEL Rico

## 2024-07-02 NOTE — NURSING NOTE
1415 patient's daughter, Ammy, notified of move to room 306.  
Attempted to call report to [facility] and was asked to hold, this nurse waited on the phone for 8 minutes and nobody ever picked up the phone for report    
This nurse called [facility] [phone] for a second time today and was put on hold and the receiving nurse once again did not answer the phone for report. This time I waited on hold for 5 minutes.    
normal

## 2024-07-05 ENCOUNTER — TELEPHONE (OUTPATIENT)
Dept: CARDIOLOGY | Facility: CLINIC | Age: 79
End: 2024-07-05
Payer: MEDICARE

## 2024-07-05 LAB
ATRIAL RATE: 92 BPM
P AXIS: 95 DEGREES
P OFFSET: 144 MS
P ONSET: 116 MS
PR INTERVAL: 162 MS
Q ONSET: 197 MS
QRS COUNT: 15 BEATS
QRS DURATION: 150 MS
QT INTERVAL: 408 MS
QTC CALCULATION(BAZETT): 504 MS
QTC FREDERICIA: 470 MS
R AXIS: 92 DEGREES
T AXIS: -59 DEGREES
T OFFSET: 401 MS
VENTRICULAR RATE: 92 BPM

## 2024-07-05 NOTE — TELEPHONE ENCOUNTER
----- Message from Sarah E Markley, LPN sent at 7/2/2024  3:41 PM EDT -----  Regarding: RE: Hospital Follow Up  [facility]  ----- Message -----  From: HANNA Goyal-CNP  Sent: 7/2/2024   3:09 PM EDT  To: [facility] Clinical Support Staff  Subject: Hospital Follow Up                               Can we please schedule this patient for a hospital follow-up appointment in 6 weeks please? Thank You!

## 2024-07-10 PROCEDURE — RXMED WILLOW AMBULATORY MEDICATION CHARGE

## 2024-07-11 NOTE — DOCUMENTATION CLARIFICATION NOTE
"    PATIENT:               LORENZA CRUZ  ACCT #:                  [id number]  MRN:                       [id number]  :                       1945  ADMIT DATE:       2024 4:31 PM  DISCH DATE:        2024 6:55 PM  RESPONDING PROVIDER #:        [id number]          PROVIDER RESPONSE TEXT:    Demand Ischemia ruled out after workup    CDI QUERY TEXT:    Clarification    Instruction:    Based on your assessment of the patient and the clinical information, please provide the requested documentation by clicking on the appropriate radio button and enter any additional information if prompted.    Question: Please further clarify the diagnosis of CDI TO ENTER as    When answering this query, please exercise your independent professional judgment. The fact that a question is being asked, does not imply that any particular answer is desired or expected.    The patient's clinical indicators include:  Clinical Information: 78 y.o. male  Who presented to the emergency room for shortness of breath and dizziness.    Clinical Indicators: History and physical notes \"Troponin elevation is most likely in the setting of an underlying demand ischemia from the CHF exacerbation\".  Troponins 24- 66- and repeat- 58    Treatment: Serial troponins- IV Lasix for CHF management and Cardiology consult    Risk Factors: Acute on chronic combined CHF - CKD- and hypertension  Options provided:  -- Demand Ischemia ruled out after workup  -- Demand ischemia ruled in for this admission  -- Other - I will add my own diagnosis  -- Refer to Clinical Documentation Reviewer    Query created by: Rayna Quigley on 2024 11:52 AM      Electronically signed by:  HARSHAD RANKIN MD 7/10/2024 11:01 PM          "

## 2024-07-16 ENCOUNTER — LAB REQUISITION (OUTPATIENT)
Dept: LAB | Facility: HOSPITAL | Age: 79
End: 2024-07-16

## 2024-07-16 ENCOUNTER — APPOINTMENT (OUTPATIENT)
Dept: PRIMARY CARE | Facility: CLINIC | Age: 79
End: 2024-07-16
Payer: MEDICARE

## 2024-07-16 DIAGNOSIS — I48.11 LONGSTANDING PERSISTENT ATRIAL FIBRILLATION (MULTI): ICD-10-CM

## 2024-07-16 DIAGNOSIS — D64.9 ANEMIA, UNSPECIFIED: ICD-10-CM

## 2024-07-16 DIAGNOSIS — Z48.812 ENCOUNTER FOR SURGICAL AFTERCARE FOLLOWING SURGERY ON THE CIRCULATORY SYSTEM: ICD-10-CM

## 2024-07-16 LAB
ALBUMIN SERPL BCP-MCNC: 3.4 G/DL (ref 3.4–5)
ALP SERPL-CCNC: 73 U/L (ref 33–136)
ALT SERPL W P-5'-P-CCNC: 26 U/L (ref 10–52)
ANION GAP SERPL CALC-SCNC: 10 MMOL/L (ref 10–20)
AST SERPL W P-5'-P-CCNC: 24 U/L (ref 9–39)
BILIRUB SERPL-MCNC: 0.5 MG/DL (ref 0–1.2)
BUN SERPL-MCNC: 30 MG/DL (ref 6–23)
CALCIUM SERPL-MCNC: 8.6 MG/DL (ref 8.6–10.3)
CHLORIDE SERPL-SCNC: 110 MMOL/L (ref 98–107)
CO2 SERPL-SCNC: 22 MMOL/L (ref 21–32)
CREAT SERPL-MCNC: 1.35 MG/DL (ref 0.5–1.3)
EGFRCR SERPLBLD CKD-EPI 2021: 54 ML/MIN/1.73M*2
ERYTHROCYTE [DISTWIDTH] IN BLOOD BY AUTOMATED COUNT: 15.8 % (ref 11.5–14.5)
EST. AVERAGE GLUCOSE BLD GHB EST-MCNC: 120 MG/DL
GLUCOSE SERPL-MCNC: 77 MG/DL (ref 74–99)
HBA1C MFR BLD: 5.8 %
HCT VFR BLD AUTO: 34.4 % (ref 41–52)
HGB BLD-MCNC: 10.9 G/DL (ref 13.5–17.5)
MAGNESIUM SERPL-MCNC: 1.92 MG/DL (ref 1.6–2.4)
MCH RBC QN AUTO: 26.7 PG (ref 26–34)
MCHC RBC AUTO-ENTMCNC: 31.7 G/DL (ref 32–36)
MCV RBC AUTO: 84 FL (ref 80–100)
NRBC BLD-RTO: 0 /100 WBCS (ref 0–0)
PLATELET # BLD AUTO: 190 X10*3/UL (ref 150–450)
POTASSIUM SERPL-SCNC: 4.1 MMOL/L (ref 3.5–5.3)
PROT SERPL-MCNC: 5.4 G/DL (ref 6.4–8.2)
RBC # BLD AUTO: 4.09 X10*6/UL (ref 4.5–5.9)
SODIUM SERPL-SCNC: 138 MMOL/L (ref 136–145)
WBC # BLD AUTO: 8.1 X10*3/UL (ref 4.4–11.3)

## 2024-07-16 PROCEDURE — 83036 HEMOGLOBIN GLYCOSYLATED A1C: CPT | Mod: OUT | Performed by: INTERNAL MEDICINE

## 2024-07-16 PROCEDURE — 83735 ASSAY OF MAGNESIUM: CPT | Mod: OUT | Performed by: INTERNAL MEDICINE

## 2024-07-16 PROCEDURE — 80053 COMPREHEN METABOLIC PANEL: CPT | Mod: OUT | Performed by: INTERNAL MEDICINE

## 2024-07-16 PROCEDURE — 85027 COMPLETE CBC AUTOMATED: CPT | Mod: OUT | Performed by: INTERNAL MEDICINE

## 2024-07-21 ENCOUNTER — NURSING HOME VISIT (OUTPATIENT)
Dept: POST ACUTE CARE | Facility: EXTERNAL LOCATION | Age: 79
End: 2024-07-21
Payer: MEDICARE

## 2024-07-21 DIAGNOSIS — Z48.812 AFTERCARE FOLLOWING SURGERY OF THE CIRCULATORY SYSTEM: Primary | ICD-10-CM

## 2024-07-21 PROCEDURE — 99304 1ST NF CARE SF/LOW MDM 25: CPT | Performed by: INTERNAL MEDICINE

## 2024-07-21 NOTE — LETTER
Patient: Og Cooper  : 1945    Encounter Date: 2024    Pt was seen in the NH.  Pt is 78 yo male with PMH  ADD Afib HTN CHF aORTIC VALVE STENOSIS ,obesity here after dc for rehab after TAVR  General appearance: Comfortable, no distress  ROS: No SOB  Medications reviewed  Head: Normal  Neck: Soft  Heart: Irregular  Lungs: Clear  Abdomen: soft    Plan:   1) PT OT  2) To continue LASIX 20 MG DAILY AND ELIQUIS 5 MG BID    Problem List Items Addressed This Visit    None  Visit Diagnoses       Aftercare following surgery of the circulatory system    -  Primary               Electronically Signed By: Nick Blank MD   24  9:39 PM

## 2024-07-22 NOTE — PROGRESS NOTES
Pt was seen in the NH.  Pt is 80 yo male with PMH  ADD Afib HTN CHF aORTIC VALVE STENOSIS ,obesity here after dc for rehab after TAVR  General appearance: Comfortable, no distress  ROS: No SOB  Medications reviewed  Head: Normal  Neck: Soft  Heart: Irregular  Lungs: Clear  Abdomen: soft    Plan:   1) PT OT  2) To continue LASIX 20 MG DAILY AND ELIQUIS 5 MG BID    Problem List Items Addressed This Visit    None  Visit Diagnoses       Aftercare following surgery of the circulatory system    -  Primary

## 2024-07-24 ENCOUNTER — LAB REQUISITION (OUTPATIENT)
Dept: LAB | Facility: HOSPITAL | Age: 79
End: 2024-07-24

## 2024-07-24 DIAGNOSIS — I13.0 HYPERTENSIVE HEART AND CHRONIC KIDNEY DISEASE WITH HEART FAILURE AND STAGE 1 THROUGH STAGE 4 CHRONIC KIDNEY DISEASE, OR UNSPECIFIED CHRONIC KIDNEY DISEASE (MULTI): ICD-10-CM

## 2024-07-24 DIAGNOSIS — Z86.73 PERSONAL HISTORY OF TRANSIENT ISCHEMIC ATTACK (TIA), AND CEREBRAL INFARCTION WITHOUT RESIDUAL DEFICITS: ICD-10-CM

## 2024-07-24 DIAGNOSIS — I50.33 ACUTE ON CHRONIC DIASTOLIC (CONGESTIVE) HEART FAILURE (MULTI): ICD-10-CM

## 2024-07-24 LAB
ANION GAP SERPL CALC-SCNC: 11 MMOL/L (ref 10–20)
BUN SERPL-MCNC: 28 MG/DL (ref 6–23)
CALCIUM SERPL-MCNC: 8.3 MG/DL (ref 8.6–10.3)
CHLORIDE SERPL-SCNC: 107 MMOL/L (ref 98–107)
CO2 SERPL-SCNC: 23 MMOL/L (ref 21–32)
CREAT SERPL-MCNC: 1.39 MG/DL (ref 0.5–1.3)
EGFRCR SERPLBLD CKD-EPI 2021: 52 ML/MIN/1.73M*2
GLUCOSE SERPL-MCNC: 74 MG/DL (ref 74–99)
POTASSIUM SERPL-SCNC: 3.9 MMOL/L (ref 3.5–5.3)
SODIUM SERPL-SCNC: 137 MMOL/L (ref 136–145)

## 2024-07-24 PROCEDURE — 82374 ASSAY BLOOD CARBON DIOXIDE: CPT | Mod: OUT | Performed by: INTERNAL MEDICINE

## 2024-07-26 ENCOUNTER — PHARMACY VISIT (OUTPATIENT)
Dept: PHARMACY | Facility: CLINIC | Age: 79
End: 2024-07-26
Payer: COMMERCIAL

## 2024-07-26 PROCEDURE — RXMED WILLOW AMBULATORY MEDICATION CHARGE

## 2024-07-26 RX ORDER — LOSARTAN POTASSIUM 25 MG/1
TABLET ORAL
Qty: 30 TABLET | Refills: 0 | OUTPATIENT
Start: 2024-07-26

## 2024-07-29 ENCOUNTER — LAB REQUISITION (OUTPATIENT)
Dept: LAB | Facility: HOSPITAL | Age: 79
End: 2024-07-29
Payer: MEDICARE

## 2024-07-29 ENCOUNTER — TELEPHONE (OUTPATIENT)
Dept: PRIMARY CARE | Facility: CLINIC | Age: 79
End: 2024-07-29
Payer: MEDICARE

## 2024-07-29 DIAGNOSIS — G47.33 OSA (OBSTRUCTIVE SLEEP APNEA): Primary | ICD-10-CM

## 2024-07-29 DIAGNOSIS — D64.9 ANEMIA, UNSPECIFIED: ICD-10-CM

## 2024-07-29 DIAGNOSIS — T82.03XA LEAKAGE OF HEART VALVE PROSTHESIS, INITIAL ENCOUNTER: ICD-10-CM

## 2024-07-29 LAB
ANION GAP SERPL CALC-SCNC: 8 MMOL/L (ref 10–20)
BUN SERPL-MCNC: 27 MG/DL (ref 6–23)
CALCIUM SERPL-MCNC: 8.9 MG/DL (ref 8.6–10.3)
CHLORIDE SERPL-SCNC: 107 MMOL/L (ref 98–107)
CO2 SERPL-SCNC: 26 MMOL/L (ref 21–32)
CREAT SERPL-MCNC: 1.45 MG/DL (ref 0.5–1.3)
EGFRCR SERPLBLD CKD-EPI 2021: 50 ML/MIN/1.73M*2
GLUCOSE SERPL-MCNC: 99 MG/DL (ref 74–99)
POTASSIUM SERPL-SCNC: 4.4 MMOL/L (ref 3.5–5.3)
SODIUM SERPL-SCNC: 137 MMOL/L (ref 136–145)

## 2024-07-29 PROCEDURE — 80048 BASIC METABOLIC PNL TOTAL CA: CPT

## 2024-08-12 ENCOUNTER — PROCEDURE VISIT (OUTPATIENT)
Dept: SLEEP MEDICINE | Facility: CLINIC | Age: 79
End: 2024-08-12
Payer: MEDICARE

## 2024-08-12 VITALS
WEIGHT: 217.15 LBS | HEIGHT: 67 IN | TEMPERATURE: 98.5 F | SYSTOLIC BLOOD PRESSURE: 136 MMHG | DIASTOLIC BLOOD PRESSURE: 88 MMHG | BODY MASS INDEX: 34.08 KG/M2

## 2024-08-12 DIAGNOSIS — G47.33 OSA (OBSTRUCTIVE SLEEP APNEA): ICD-10-CM

## 2024-08-12 ASSESSMENT — SLEEP AND FATIGUE QUESTIONNAIRES
HOW LIKELY ARE YOU TO NOD OFF OR FALL ASLEEP WHILE SITTING AND TALKING TO SOMEONE: WOULD NEVER DOZE
HOW LIKELY ARE YOU TO NOD OFF OR FALL ASLEEP WHEN YOU ARE A PASSENGER IN A CAR FOR AN HOUR WITHOUT A BREAK: WOULD NEVER DOZE
HOW LIKELY ARE YOU TO NOD OFF OR FALL ASLEEP IN A CAR, WHILE STOPPED FOR A FEW MINUTES IN TRAFFIC: WOULD NEVER DOZE
ESS-CHAD TOTAL SCORE: 1
HOW LIKELY ARE YOU TO NOD OFF OR FALL ASLEEP WHILE SITTING AND READING: SLIGHT CHANCE OF DOZING
HOW LIKELY ARE YOU TO NOD OFF OR FALL ASLEEP WHILE LYING DOWN TO REST IN THE AFTERNOON WHEN CIRCUMSTANCES PERMIT: WOULD NEVER DOZE
HOW LIKELY ARE YOU TO NOD OFF OR FALL ASLEEP WHILE SITTING QUIETLY AFTER LUNCH WITHOUT ALCOHOL: WOULD NEVER DOZE
HOW LIKELY ARE YOU TO NOD OFF OR FALL ASLEEP WHILE WATCHING TV: WOULD NEVER DOZE
SITING INACTIVE IN A PUBLIC PLACE LIKE A CLASS ROOM OR A MOVIE THEATER: WOULD NEVER DOZE

## 2024-08-13 ENCOUNTER — APPOINTMENT (OUTPATIENT)
Dept: CARDIOLOGY | Facility: CLINIC | Age: 79
End: 2024-08-13
Payer: MEDICARE

## 2024-08-13 NOTE — PROGRESS NOTES
[facility] TECH NOTE:     Patient: Og Cooper   MRN//AGE: [id number]  1945  79 y.o.   Technologist: Barbara Fatima   Room: 4   Service Date: 2024        Sleep Testing Location: [place] [address]     Cicero: 1    TECHNOLOGIST SLEEP STUDY PROCEDURE NOTE:   This sleep study is being conducted according to the policies and procedures outlined by the AAS accreditation standards.  The sleep study procedure and processes involved during this appointment was explained to the patient and all questions were answered. The patient verbalized understanding.      The patient is a 79 y.o. year old male scheduled for aDiagnostic PSG Split night with montage of: PSG/CPAP. he arrived for his appointment.      The study that was ultimately completed was aDiagnostic PSG Split night with montage of: PSG/CPAP.    The full study Was completed.  Patient questionnaires completed?: yes     Consents signed? yes    Initial Fall Risk Screening:     Og has fallen in the last 6 months. his did not result in injury. Og does have a fear of falling. He does not need assistance with sitting, standing, or walking. he does not need assistance walking in his home. he does not need assistance in an unfamiliar setting. The patient is notusing an assistive device.     Brief Study observations: Room 4. SPLIT study ordered. Patient wears CPAP every night at home with full face mask. The F&P Vitera was used for this study. SPLIT study was completed successfully.         If PAP, which was preferred mask/pressure/mode: CPAP [id number]. F&P Vitera Full Face Mask in medium      After the procedure, the patient was informed to ensure followup with ordering clinician for testing results.      Technologist: Barbara Fatima, [facility]

## 2024-08-19 ENCOUNTER — PATIENT MESSAGE (OUTPATIENT)
Dept: PRIMARY CARE | Facility: CLINIC | Age: 79
End: 2024-08-19
Payer: MEDICARE

## 2024-08-19 DIAGNOSIS — I48.21 PERMANENT ATRIAL FIBRILLATION (MULTI): Primary | ICD-10-CM

## 2024-08-19 PROCEDURE — RXMED WILLOW AMBULATORY MEDICATION CHARGE

## 2024-08-19 RX ORDER — LOSARTAN POTASSIUM 50 MG/1
50 TABLET ORAL DAILY
Qty: 90 TABLET | Refills: 3 | Status: SHIPPED | OUTPATIENT
Start: 2024-08-19 | End: 2025-09-23

## 2024-08-19 RX ORDER — AMLODIPINE BESYLATE 5 MG/1
5 TABLET ORAL DAILY
Qty: 90 TABLET | Refills: 3 | Status: SHIPPED | OUTPATIENT
Start: 2024-08-19

## 2024-08-22 ENCOUNTER — TELEMEDICINE (OUTPATIENT)
Dept: PRIMARY CARE | Facility: CLINIC | Age: 79
End: 2024-08-22
Payer: MEDICARE

## 2024-08-22 DIAGNOSIS — G47.33 OSA (OBSTRUCTIVE SLEEP APNEA): ICD-10-CM

## 2024-08-22 DIAGNOSIS — N18.31 STAGE 3A CHRONIC KIDNEY DISEASE (MULTI): ICD-10-CM

## 2024-08-22 DIAGNOSIS — R19.7 DIARRHEA, UNSPECIFIED TYPE: Primary | ICD-10-CM

## 2024-08-22 PROCEDURE — 1123F ACP DISCUSS/DSCN MKR DOCD: CPT | Performed by: INTERNAL MEDICINE

## 2024-08-22 PROCEDURE — 1159F MED LIST DOCD IN RCRD: CPT | Performed by: INTERNAL MEDICINE

## 2024-08-22 PROCEDURE — 99213 OFFICE O/P EST LOW 20 MIN: CPT | Performed by: INTERNAL MEDICINE

## 2024-08-22 PROCEDURE — 1036F TOBACCO NON-USER: CPT | Performed by: INTERNAL MEDICINE

## 2024-08-22 PROCEDURE — 1157F ADVNC CARE PLAN IN RCRD: CPT | Performed by: INTERNAL MEDICINE

## 2024-08-22 PROCEDURE — 1160F RVW MEDS BY RX/DR IN RCRD: CPT | Performed by: INTERNAL MEDICINE

## 2024-08-22 ASSESSMENT — ENCOUNTER SYMPTOMS
DIARRHEA: 1
NAUSEA: 0
CONSTIPATION: 0
ABDOMINAL PAIN: 1
VOMITING: 0

## 2024-08-22 NOTE — PROGRESS NOTES
Subjective   Patient ID: Og Coopre is a 79 y.o. male who presents for telephone visit with chief complaint of diarrhea.   HPI  Patient is here today for follow up telephone follow up for diarrhea.     Pt and physician are at two separate physical locations.   Pt  was verbally consented for the encounter.     Patient reports that the last few day he has bene having diarrhea that has progressed to clear liquid. Pt reports that he eats a main meal at [employer], makes his own breakfast. The other day he had lentils and chicken and rice from [employer] and then it started after that.   No fevers, no nausea, no vomiting, slight abd cramping and loud gurgling.   He is still taking a probiotic but he had stopped it a while back.   Tried otc immodium.     Review of Systems   Gastrointestinal:  Positive for abdominal pain and diarrhea. Negative for constipation, nausea and vomiting.       Objective   There were no vitals taken for this visit.    Physical Exam  No physical exam was completed due to telephone visit.     Assessment/Plan   Problem List Items Addressed This Visit       Stage 3a chronic kidney disease (Multi)    Diarrhea - Primary    Relevant Orders    C. difficile, PCR    Stool Pathogen Panel, PCR    Comprehensive Metabolic Panel    CBC and Auto Differential    SUNSHINE (obstructive sleep apnea)     Diarrhea   - suspect either food borne illness or gastroenteritis   - will change bmp to cmp and order cbc   - will order c diff and stool studies   - can continue immodium  - recommend bland foods   - if diarrhea worsens can send in lomotil     2. Adjusted bp meds, reduced norvasc and increased losartan, pt to get labs     3. Will find out where DME company is in network that family wants to use and will get new cpap ordered, it is imperative that he continue to use cpap with his heart conditions, sleep study showed severe sunshine optimized with setting of 12.     Final diagnoses:   [R19.7] Diarrhea, unspecified type    [N18.31] Stage 3a chronic kidney disease (Multi)   [G47.33] SUNSHINE (obstructive sleep apnea)

## 2024-08-23 ENCOUNTER — LAB (OUTPATIENT)
Dept: LAB | Facility: LAB | Age: 79
End: 2024-08-23
Payer: MEDICARE

## 2024-08-23 DIAGNOSIS — R19.7 DIARRHEA, UNSPECIFIED TYPE: ICD-10-CM

## 2024-08-23 DIAGNOSIS — I48.21 PERMANENT ATRIAL FIBRILLATION (MULTI): ICD-10-CM

## 2024-08-23 LAB
ALBUMIN SERPL BCP-MCNC: 4.2 G/DL (ref 3.4–5)
ALP SERPL-CCNC: 90 U/L (ref 33–136)
ALT SERPL W P-5'-P-CCNC: 15 U/L (ref 10–52)
ANION GAP SERPL CALC-SCNC: 10 MMOL/L (ref 10–20)
AST SERPL W P-5'-P-CCNC: 22 U/L (ref 9–39)
BASOPHILS # BLD AUTO: 0.03 X10*3/UL (ref 0–0.1)
BASOPHILS NFR BLD AUTO: 0.4 %
BILIRUB SERPL-MCNC: 0.8 MG/DL (ref 0–1.2)
BUN SERPL-MCNC: 27 MG/DL (ref 6–23)
CALCIUM SERPL-MCNC: 9.1 MG/DL (ref 8.6–10.3)
CHLORIDE SERPL-SCNC: 106 MMOL/L (ref 98–107)
CO2 SERPL-SCNC: 27 MMOL/L (ref 21–32)
CREAT SERPL-MCNC: 1.37 MG/DL (ref 0.5–1.3)
EGFRCR SERPLBLD CKD-EPI 2021: 53 ML/MIN/1.73M*2
EOSINOPHIL # BLD AUTO: 0.62 X10*3/UL (ref 0–0.4)
EOSINOPHIL NFR BLD AUTO: 8.2 %
ERYTHROCYTE [DISTWIDTH] IN BLOOD BY AUTOMATED COUNT: 15.4 % (ref 11.5–14.5)
GLUCOSE SERPL-MCNC: 85 MG/DL (ref 74–99)
HCT VFR BLD AUTO: 39.8 % (ref 41–52)
HGB BLD-MCNC: 12.5 G/DL (ref 13.5–17.5)
IMM GRANULOCYTES # BLD AUTO: 0.03 X10*3/UL (ref 0–0.5)
IMM GRANULOCYTES NFR BLD AUTO: 0.4 % (ref 0–0.9)
LYMPHOCYTES # BLD AUTO: 1.29 X10*3/UL (ref 0.8–3)
LYMPHOCYTES NFR BLD AUTO: 17 %
MCH RBC QN AUTO: 27.4 PG (ref 26–34)
MCHC RBC AUTO-ENTMCNC: 31.4 G/DL (ref 32–36)
MCV RBC AUTO: 87 FL (ref 80–100)
MONOCYTES # BLD AUTO: 0.65 X10*3/UL (ref 0.05–0.8)
MONOCYTES NFR BLD AUTO: 8.6 %
NEUTROPHILS # BLD AUTO: 4.97 X10*3/UL (ref 1.6–5.5)
NEUTROPHILS NFR BLD AUTO: 65.4 %
NRBC BLD-RTO: 0 /100 WBCS (ref 0–0)
PLATELET # BLD AUTO: 125 X10*3/UL (ref 150–450)
POTASSIUM SERPL-SCNC: 4.3 MMOL/L (ref 3.5–5.3)
PROT SERPL-MCNC: 6.6 G/DL (ref 6.4–8.2)
RBC # BLD AUTO: 4.56 X10*6/UL (ref 4.5–5.9)
SODIUM SERPL-SCNC: 139 MMOL/L (ref 136–145)
WBC # BLD AUTO: 7.6 X10*3/UL (ref 4.4–11.3)

## 2024-08-23 PROCEDURE — 36415 COLL VENOUS BLD VENIPUNCTURE: CPT

## 2024-08-23 PROCEDURE — 85025 COMPLETE CBC W/AUTO DIFF WBC: CPT

## 2024-08-23 PROCEDURE — RXMED WILLOW AMBULATORY MEDICATION CHARGE

## 2024-08-23 PROCEDURE — 80053 COMPREHEN METABOLIC PANEL: CPT

## 2024-08-24 ENCOUNTER — PHARMACY VISIT (OUTPATIENT)
Dept: PHARMACY | Facility: CLINIC | Age: 79
End: 2024-08-24
Payer: COMMERCIAL

## 2024-08-24 ENCOUNTER — LAB (OUTPATIENT)
Dept: LAB | Facility: LAB | Age: 79
End: 2024-08-24
Payer: MEDICARE

## 2024-08-24 DIAGNOSIS — R19.7 DIARRHEA, UNSPECIFIED TYPE: ICD-10-CM

## 2024-08-24 PROCEDURE — 87506 IADNA-DNA/RNA PROBE TQ 6-11: CPT

## 2024-08-26 LAB

## 2024-08-27 DIAGNOSIS — F90.9 ATTENTION DEFICIT HYPERACTIVITY DISORDER (ADHD), UNSPECIFIED ADHD TYPE: ICD-10-CM

## 2024-08-27 PROCEDURE — RXMED WILLOW AMBULATORY MEDICATION CHARGE

## 2024-08-27 RX ORDER — METHYLPHENIDATE HYDROCHLORIDE 5 MG/1
2.5 TABLET ORAL DAILY
Qty: 30 TABLET | Refills: 0 | Status: SHIPPED | OUTPATIENT
Start: 2024-08-27

## 2024-08-29 ENCOUNTER — PHARMACY VISIT (OUTPATIENT)
Dept: PHARMACY | Facility: CLINIC | Age: 79
End: 2024-08-29
Payer: COMMERCIAL

## 2024-08-31 PROCEDURE — RXMED WILLOW AMBULATORY MEDICATION CHARGE

## 2024-09-02 NOTE — ED PROVIDER NOTES
HPI   Chief Complaint   Patient presents with    Shortness of Breath    Dizziness     Patient has had intermittent episodes of sob and dizziness for several days. Denies CP, fevers or cough. Unsure if sob and dizziness is related to potentially having a panic attack. EKG done at bedside.        79-year-old male presents with intermittent episodes of dyspnea and some vertigo.  Patient denies any fever, chills, nausea or vomiting.  Patient has anxiety and feels some of the symptoms may be related to that.  Patient clinically may be in congestive heart failure and will require further treatment for that.      History provided by:  Patient          Patient History   Past Medical History:   Diagnosis Date    Hypertension     Personal history of other diseases of the circulatory system     History of congestive heart failure     Past Surgical History:   Procedure Laterality Date    CARDIAC SURGERY      OTHER SURGICAL HISTORY  2019    Nose surgery    OTHER SURGICAL HISTORY  2019    Heart surgery     No family history on file.  Social History     Tobacco Use    Smoking status: Former     Current packs/day: 0.00     Types: Cigarettes     Quit date: 1989     Years since quittin.8    Smokeless tobacco: Never   Vaping Use    Vaping status: Never Used   Substance Use Topics    Alcohol use: Not Currently    Drug use: Never       Physical Exam   ED Triage Vitals   Temp Heart Rate Resp BP   24 1632 24 1632 24 1632 24 1632   36.7 °C (98.1 °F) 91 16 156/68      SpO2 Temp Source Heart Rate Source Patient Position   24 1632 24 1632 24 1632 24 1632   (!) 93 % Temporal Monitor Sitting      BP Location FiO2 (%)     24 1632 24 0219     Left arm 28 %       Physical Exam  Vitals and nursing note reviewed.   Constitutional:       General: He is not in acute distress.     Appearance: He is well-developed.   HENT:      Head: Normocephalic and atraumatic.   Eyes:       Conjunctiva/sclera: Conjunctivae normal.   Cardiovascular:      Rate and Rhythm: Normal rate and regular rhythm.      Heart sounds: No murmur heard.  Pulmonary:      Effort: Pulmonary effort is normal. No respiratory distress.      Breath sounds: Examination of the right-lower field reveals decreased breath sounds and rales. Examination of the left-lower field reveals decreased breath sounds and rales. Decreased breath sounds and rales present.   Abdominal:      Palpations: Abdomen is soft.      Tenderness: There is no abdominal tenderness.   Musculoskeletal:         General: No swelling.      Cervical back: Neck supple.   Skin:     General: Skin is warm and dry.      Capillary Refill: Capillary refill takes less than 2 seconds.   Neurological:      Mental Status: He is alert.   Psychiatric:         Mood and Affect: Mood normal.         Labs Reviewed   CBC WITH AUTO DIFFERENTIAL - Abnormal       Result Value    WBC 10.9      nRBC 0.0      RBC 4.82      Hemoglobin 12.5 (*)     Hematocrit 40.3 (*)     MCV 84      MCH 25.9 (*)     MCHC 31.0 (*)     RDW 15.1 (*)     Platelets 151      Neutrophils % 78.3      Immature Granulocytes %, Automated 0.3      Lymphocytes % 9.3      Monocytes % 8.6      Eosinophils % 3.2      Basophils % 0.3      Neutrophils Absolute 8.52 (*)     Immature Granulocytes Absolute, Automated 0.03      Lymphocytes Absolute 1.01      Monocytes Absolute 0.94 (*)     Eosinophils Absolute 0.35      Basophils Absolute 0.03     COMPREHENSIVE METABOLIC PANEL - Abnormal    Glucose 110 (*)     Sodium 137      Potassium 4.7      Chloride 109 (*)     Bicarbonate 21      Anion Gap 12      Urea Nitrogen 34 (*)     Creatinine 1.52 (*)     eGFR 47 (*)     Calcium 8.9      Albumin 4.0      Alkaline Phosphatase 99      Total Protein 6.3 (*)     AST 33      Bilirubin, Total 0.8      ALT 30     B-TYPE NATRIURETIC PEPTIDE - Abnormal     (*)     Narrative:        <100 pg/mL - Heart failure unlikely  100-299  pg/mL - Intermediate probability of acute heart                  failure exacerbation. Correlate with clinical                  context and patient history.    >=300 pg/mL - Heart Failure likely. Correlate with clinical                  context and patient history.    BNP testing is performed using different testing methodology at [facility] than at other [facility]. Direct result comparisons should only be made within the same method.      SERIAL TROPONIN-INITIAL - Abnormal    Troponin I, High Sensitivity 66 (*)     Narrative:     Less than 99th percentile of normal range cutoff-  Female and children under 18 years old <14 ng/L; Male <21 ng/L: Negative  Repeat testing should be performed if clinically indicated.     Female and children under 18 years old 14-50 ng/L; Male 21-50 ng/L:  Consistent with possible cardiac damage and possible increased clinical   risk. Serial measurements may help to assess extent of myocardial damage.     >50 ng/L: Consistent with cardiac damage, increased clinical risk and  myocardial infarction. Serial measurements may help assess extent of   myocardial damage.      NOTE: Children less than 1 year old may have higher baseline troponin   levels and results should be interpreted in conjunction with the overall   clinical context.     NOTE: Troponin I testing is performed using a different   testing methodology at [facility] than at other   [facility]. Direct result comparisons should only   be made within the same method.   SERIAL TROPONIN, 1 HOUR - Abnormal    Troponin I, High Sensitivity 58 (*)     Narrative:     Less than 99th percentile of normal range cutoff-  Female and children under 18 years old <14 ng/L; Male <21 ng/L: Negative  Repeat testing should be performed if clinically indicated.     Female and children under 18 years old 14-50 ng/L; Male 21-50 ng/L:  Consistent with possible cardiac damage and possible increased clinical   risk.  Serial measurements may help to assess extent of myocardial damage.     >50 ng/L: Consistent with cardiac damage, increased clinical risk and  myocardial infarction. Serial measurements may help assess extent of   myocardial damage.      NOTE: Children less than 1 year old may have higher baseline troponin   levels and results should be interpreted in conjunction with the overall   clinical context.     NOTE: Troponin I testing is performed using a different   testing methodology at [facility] than at other   system [facility]. Direct result comparisons should only   be made within the same method.   BASIC METABOLIC PANEL - Abnormal    Glucose 96      Sodium 137      Potassium 4.3      Chloride 106      Bicarbonate 24      Anion Gap 11      Urea Nitrogen 33 (*)     Creatinine 1.56 (*)     eGFR 46 (*)     Calcium 8.9     BASIC METABOLIC PANEL - Abnormal    Glucose 99      Sodium 139      Potassium 4.0      Chloride 108 (*)     Bicarbonate 24      Anion Gap 11      Urea Nitrogen 50 (*)     Creatinine 1.92 (*)     eGFR 36 (*)     Calcium 8.7     B-TYPE NATRIURETIC PEPTIDE - Abnormal     (*)     Narrative:        <100 pg/mL - Heart failure unlikely  100-299 pg/mL - Intermediate probability of acute heart                  failure exacerbation. Correlate with clinical                  context and patient history.    >=300 pg/mL - Heart Failure likely. Correlate with clinical                  context and patient history.    BNP testing is performed using different testing methodology at [facility] than at other [facility]. Direct result comparisons should only be made within the same method.      CBC - Abnormal    WBC 9.3      nRBC 0.0      RBC 4.49 (*)     Hemoglobin 12.0 (*)     Hematocrit 37.8 (*)     MCV 84      MCH 26.7      MCHC 31.7 (*)     RDW 15.4 (*)     Platelets 148 (*)    BASIC METABOLIC PANEL - Abnormal    Glucose 99      Sodium 136      Potassium 4.2      Chloride 106       Bicarbonate 23      Anion Gap 11      Urea Nitrogen 59 (*)     Creatinine 1.68 (*)     eGFR 42 (*)     Calcium 8.5 (*)    CBC - Abnormal    WBC 8.6      nRBC 0.0      RBC 4.44 (*)     Hemoglobin 11.8 (*)     Hematocrit 37.3 (*)     MCV 84      MCH 26.6      MCHC 31.6 (*)     RDW 15.0 (*)     Platelets 156     BASIC METABOLIC PANEL - Abnormal    Glucose 102 (*)     Sodium 138      Potassium 4.3      Chloride 106      Bicarbonate 24      Anion Gap 12      Urea Nitrogen 59 (*)     Creatinine 1.78 (*)     eGFR 39 (*)     Calcium 8.5 (*)    MAGNESIUM - Normal    Magnesium 1.83     MAGNESIUM - Normal    Magnesium 1.96     MAGNESIUM - Normal    Magnesium 1.94     TROPONIN SERIES- (INITIAL, 1 HR)    Narrative:     The following orders were created for panel order Troponin I Series, High Sensitivity (0, 1 HR).  Procedure                               Abnormality         Status                     ---------                               -----------         ------                     Troponin I, High Sensiti...[[id number]]  Abnormal            Final result               Troponin, High Sensitivi...[[id number]]  Abnormal            Final result                 Please view results for these tests on the individual orders.      Transthoracic Echo (TTE) Complete   Final Result      Cardiac Device Check - In Clinic   Final Result      XR chest 1 view   Final Result   Prominent basilar interstitial markings new from prior study   suggesting mild edema.        No discrete consolidation or effusion        Signed by: Gio Bennett 6/28/2024 5:12 PM   Dictation workstation:   [id number]         ED Course & MDM   Diagnoses as of 09/02/24 1928   Acute on chronic congestive heart failure, unspecified heart failure type (Multi)   CHF (congestive heart failure), NYHA class I, acute on chronic, combined (Multi)                 No data recorded     Jeremiah Coma Scale Score: 15 (07/02/24 0900 : Shannan Truong RN)                            Medical Decision Making  admit    Procedure  Procedures     Josse Archibald,   09/02/24 1932

## 2024-09-13 ENCOUNTER — PHARMACY VISIT (OUTPATIENT)
Dept: PHARMACY | Facility: CLINIC | Age: 79
End: 2024-09-13
Payer: COMMERCIAL

## 2024-09-13 ENCOUNTER — OFFICE VISIT (OUTPATIENT)
Dept: PRIMARY CARE | Facility: CLINIC | Age: 79
End: 2024-09-13
Payer: MEDICARE

## 2024-09-13 VITALS
DIASTOLIC BLOOD PRESSURE: 78 MMHG | SYSTOLIC BLOOD PRESSURE: 133 MMHG | HEART RATE: 69 BPM | WEIGHT: 217 LBS | BODY MASS INDEX: 34.06 KG/M2 | HEIGHT: 67 IN

## 2024-09-13 DIAGNOSIS — R21 RASH DUE TO ALLERGY: Primary | ICD-10-CM

## 2024-09-13 DIAGNOSIS — T78.40XA RASH DUE TO ALLERGY: Primary | ICD-10-CM

## 2024-09-13 DIAGNOSIS — B49 FUNGAL INFECTION: ICD-10-CM

## 2024-09-13 PROCEDURE — 3075F SYST BP GE 130 - 139MM HG: CPT

## 2024-09-13 PROCEDURE — 1036F TOBACCO NON-USER: CPT

## 2024-09-13 PROCEDURE — 1159F MED LIST DOCD IN RCRD: CPT

## 2024-09-13 PROCEDURE — RXMED WILLOW AMBULATORY MEDICATION CHARGE

## 2024-09-13 PROCEDURE — 99214 OFFICE O/P EST MOD 30 MIN: CPT

## 2024-09-13 PROCEDURE — 3078F DIAST BP <80 MM HG: CPT

## 2024-09-13 PROCEDURE — 1123F ACP DISCUSS/DSCN MKR DOCD: CPT

## 2024-09-13 PROCEDURE — 1157F ADVNC CARE PLAN IN RCRD: CPT

## 2024-09-13 RX ORDER — BETAMETHASONE DIPROPIONATE 0.5 MG/G
CREAM TOPICAL 2 TIMES DAILY PRN
Qty: 15 G | Refills: 0 | Status: SHIPPED | OUTPATIENT
Start: 2024-09-13 | End: 2025-01-11

## 2024-09-13 RX ORDER — NYSTATIN 100000 U/G
CREAM TOPICAL 2 TIMES DAILY
Qty: 30 G | Refills: 0 | Status: SHIPPED | OUTPATIENT
Start: 2024-09-13 | End: 2024-09-20

## 2024-09-13 RX ORDER — NYSTATIN 100000 U/G
CREAM TOPICAL 2 TIMES DAILY
Qty: 30 G | Refills: 0 | Status: SHIPPED | OUTPATIENT
Start: 2024-09-13 | End: 2024-09-13 | Stop reason: WASHOUT

## 2024-09-13 ASSESSMENT — ENCOUNTER SYMPTOMS
FATIGUE: 0
HEADACHES: 0
FEVER: 0
CHILLS: 0

## 2024-09-13 NOTE — PROGRESS NOTES
"Subjective   Patient ID: Og Cooper is a 79 y.o. male who presents for Rash.    Rash  Pertinent negatives include no fatigue or fever.      Here today for a rash that started on his armpits after eh switched Deoderant and then the rash spread to his coccyx area. Reports he also changed his laundry detergent as well as he is doing his own laundry now. Reports the rash is itchy and irritated.   Review of Systems   Constitutional:  Negative for chills, fatigue and fever.   Skin:  Positive for rash.   Neurological:  Negative for headaches.       Objective   /78 (Patient Position: Sitting)   Pulse 69   Ht 1.702 m (5' 7\")   Wt 98.4 kg (217 lb)   BMI 33.99 kg/m²     Physical Exam  Skin:     Findings: Rash present. Rash is urticarial.          Neurological:      Mental Status: He is alert.         Assessment/Plan   Problem List Items Addressed This Visit    None  Visit Diagnoses         Codes    Rash due to allergy    -  Primary T78.40XA, R21    Relevant Medications    betamethasone dipropionate 0.05 % cream             Allergic dermatitis   -Will try betamethasone cream under armpits. If it does not improve by Monday will consider PO steroids vs fungal infection requiring nystatin.     SUNSHINE  -Sleep study results indicate need for CPAP at 12cm H2O, order faxed to [facility]  "

## 2024-09-20 ENCOUNTER — TELEPHONE (OUTPATIENT)
Dept: PRIMARY CARE | Facility: CLINIC | Age: 79
End: 2024-09-20
Payer: MEDICARE

## 2024-09-20 DIAGNOSIS — T78.40XA RASH DUE TO ALLERGY: Primary | ICD-10-CM

## 2024-09-20 DIAGNOSIS — R21 RASH DUE TO ALLERGY: Primary | ICD-10-CM

## 2024-09-20 NOTE — TELEPHONE ENCOUNTER
Pt was seen last week for a rash last week - the arm rash is gone but now he has a rash on his back - asking about oral steroids for this?

## 2024-09-24 PROCEDURE — RXMED WILLOW AMBULATORY MEDICATION CHARGE

## 2024-09-24 RX ORDER — METHYLPREDNISOLONE 4 MG/1
TABLET ORAL
Qty: 21 TABLET | Refills: 0 | Status: SHIPPED | OUTPATIENT
Start: 2024-09-24 | End: 2024-10-01

## 2024-09-25 ENCOUNTER — TELEPHONE (OUTPATIENT)
Dept: PRIMARY CARE | Facility: CLINIC | Age: 79
End: 2024-09-25

## 2024-09-25 ENCOUNTER — APPOINTMENT (OUTPATIENT)
Dept: PRIMARY CARE | Facility: CLINIC | Age: 79
End: 2024-09-25
Payer: MEDICARE

## 2024-09-25 NOTE — TELEPHONE ENCOUNTER
Pt made complaint of not being able to sleep due to the rash. Pt asked for meds or to see a sleep dr.     Will move pt apt up sooner as well.

## 2024-10-01 PROCEDURE — RXMED WILLOW AMBULATORY MEDICATION CHARGE

## 2024-10-03 ENCOUNTER — PHARMACY VISIT (OUTPATIENT)
Dept: PHARMACY | Facility: CLINIC | Age: 79
End: 2024-10-03
Payer: COMMERCIAL

## 2024-10-03 PROCEDURE — RXOTC WILLOW AMBULATORY OTC CHARGE

## 2024-10-07 DIAGNOSIS — F90.9 ATTENTION DEFICIT HYPERACTIVITY DISORDER (ADHD), UNSPECIFIED ADHD TYPE: ICD-10-CM

## 2024-10-07 RX ORDER — METHYLPHENIDATE HYDROCHLORIDE 5 MG/1
2.5 TABLET ORAL DAILY
Qty: 30 TABLET | Refills: 0 | Status: SHIPPED | OUTPATIENT
Start: 2024-10-07 | End: 2024-10-08 | Stop reason: SDUPTHER

## 2024-10-08 ENCOUNTER — PHARMACY VISIT (OUTPATIENT)
Dept: PHARMACY | Facility: CLINIC | Age: 79
End: 2024-10-08
Payer: COMMERCIAL

## 2024-10-08 ENCOUNTER — TELEPHONE (OUTPATIENT)
Dept: PRIMARY CARE | Facility: CLINIC | Age: 79
End: 2024-10-08
Payer: MEDICARE

## 2024-10-08 DIAGNOSIS — I50.43 CHF (CONGESTIVE HEART FAILURE), NYHA CLASS I, ACUTE ON CHRONIC, COMBINED: ICD-10-CM

## 2024-10-08 DIAGNOSIS — F90.9 ATTENTION DEFICIT HYPERACTIVITY DISORDER (ADHD), UNSPECIFIED ADHD TYPE: ICD-10-CM

## 2024-10-08 PROCEDURE — RXMED WILLOW AMBULATORY MEDICATION CHARGE

## 2024-10-08 RX ORDER — FUROSEMIDE 20 MG/1
20 TABLET ORAL DAILY
Qty: 90 TABLET | Refills: 0 | Status: SHIPPED | OUTPATIENT
Start: 2024-10-08 | End: 2025-01-06

## 2024-10-08 RX ORDER — METHYLPHENIDATE HYDROCHLORIDE 5 MG/1
5 TABLET ORAL DAILY
Qty: 30 TABLET | Refills: 0 | Status: SHIPPED | OUTPATIENT
Start: 2024-10-08

## 2024-10-08 NOTE — TELEPHONE ENCOUNTER
Pts daughter said he has been on 5 MG tabs of his methylphenidate for as long as she can remember; she wants to know when this changed because the pharmacy will not fill his medications early

## 2024-10-17 ENCOUNTER — APPOINTMENT (OUTPATIENT)
Dept: PRIMARY CARE | Facility: CLINIC | Age: 79
End: 2024-10-17
Payer: MEDICARE

## 2024-10-17 VITALS
HEART RATE: 81 BPM | WEIGHT: 215 LBS | SYSTOLIC BLOOD PRESSURE: 151 MMHG | BODY MASS INDEX: 33.74 KG/M2 | DIASTOLIC BLOOD PRESSURE: 86 MMHG | HEIGHT: 67 IN

## 2024-10-17 DIAGNOSIS — I10 ESSENTIAL HYPERTENSION: ICD-10-CM

## 2024-10-17 DIAGNOSIS — N40.1 BENIGN PROSTATIC HYPERPLASIA WITH URINARY OBSTRUCTION AND OTHER LOWER URINARY TRACT SYMPTOMS: ICD-10-CM

## 2024-10-17 DIAGNOSIS — I48.21 PERMANENT ATRIAL FIBRILLATION (MULTI): Primary | ICD-10-CM

## 2024-10-17 DIAGNOSIS — F41.9 ANXIETY: ICD-10-CM

## 2024-10-17 DIAGNOSIS — M47.816 SPONDYLOSIS OF LUMBAR REGION WITHOUT MYELOPATHY OR RADICULOPATHY: ICD-10-CM

## 2024-10-17 DIAGNOSIS — E66.01 CLASS 2 SEVERE OBESITY WITH BODY MASS INDEX (BMI) OF 35 TO 39.9 WITH SERIOUS COMORBIDITY: ICD-10-CM

## 2024-10-17 DIAGNOSIS — G47.33 OSA (OBSTRUCTIVE SLEEP APNEA): ICD-10-CM

## 2024-10-17 DIAGNOSIS — K21.9 GASTROESOPHAGEAL REFLUX DISEASE WITHOUT ESOPHAGITIS: ICD-10-CM

## 2024-10-17 DIAGNOSIS — N18.31 STAGE 3A CHRONIC KIDNEY DISEASE (MULTI): ICD-10-CM

## 2024-10-17 DIAGNOSIS — I77.810 AORTIC ROOT DILATATION (CMS-HCC): ICD-10-CM

## 2024-10-17 DIAGNOSIS — I50.43 CHF (CONGESTIVE HEART FAILURE), NYHA CLASS I, ACUTE ON CHRONIC, COMBINED: ICD-10-CM

## 2024-10-17 DIAGNOSIS — E66.812 CLASS 2 SEVERE OBESITY WITH BODY MASS INDEX (BMI) OF 35 TO 39.9 WITH SERIOUS COMORBIDITY: ICD-10-CM

## 2024-10-17 DIAGNOSIS — I50.22 CHRONIC SYSTOLIC CONGESTIVE HEART FAILURE: ICD-10-CM

## 2024-10-17 DIAGNOSIS — F33.41 RECURRENT MAJOR DEPRESSIVE DISORDER, IN PARTIAL REMISSION (CMS-HCC): ICD-10-CM

## 2024-10-17 DIAGNOSIS — N13.8 BENIGN PROSTATIC HYPERPLASIA WITH URINARY OBSTRUCTION AND OTHER LOWER URINARY TRACT SYMPTOMS: ICD-10-CM

## 2024-10-17 PROCEDURE — 1123F ACP DISCUSS/DSCN MKR DOCD: CPT | Performed by: INTERNAL MEDICINE

## 2024-10-17 PROCEDURE — 1036F TOBACCO NON-USER: CPT | Performed by: INTERNAL MEDICINE

## 2024-10-17 PROCEDURE — 1160F RVW MEDS BY RX/DR IN RCRD: CPT | Performed by: INTERNAL MEDICINE

## 2024-10-17 PROCEDURE — 99214 OFFICE O/P EST MOD 30 MIN: CPT | Performed by: INTERNAL MEDICINE

## 2024-10-17 PROCEDURE — 1159F MED LIST DOCD IN RCRD: CPT | Performed by: INTERNAL MEDICINE

## 2024-10-17 PROCEDURE — 3077F SYST BP >= 140 MM HG: CPT | Performed by: INTERNAL MEDICINE

## 2024-10-17 PROCEDURE — 3079F DIAST BP 80-89 MM HG: CPT | Performed by: INTERNAL MEDICINE

## 2024-10-17 PROCEDURE — RXMED WILLOW AMBULATORY MEDICATION CHARGE

## 2024-10-17 PROCEDURE — 1157F ADVNC CARE PLAN IN RCRD: CPT | Performed by: INTERNAL MEDICINE

## 2024-10-17 RX ORDER — BUPROPION HYDROCHLORIDE 150 MG/1
150 TABLET, EXTENDED RELEASE ORAL 2 TIMES DAILY
COMMUNITY

## 2024-10-17 ASSESSMENT — ENCOUNTER SYMPTOMS
COUGH: 0
NUMBNESS: 0
SINUS PAIN: 0
APPETITE CHANGE: 0
ABDOMINAL DISTENTION: 0
VOMITING: 0
ACTIVITY CHANGE: 0
WEAKNESS: 0
CHILLS: 0
BACK PAIN: 0
SINUS PRESSURE: 0
SORE THROAT: 0
SHORTNESS OF BREATH: 0
DIARRHEA: 0
FATIGUE: 0
WHEEZING: 0
NAUSEA: 0

## 2024-10-17 NOTE — PROGRESS NOTES
"Subjective   Patient ID: Og Cooper is a 79 y.o. male who presents for Follow-up (6 month/Patient is confused about what his Wellbutrin dosage is; I guess his daughter had adjusted it; he believes he is on 250 MG daily/His daughter took him off Lisinopril and put him on Losartan which caused him a terrible rash so then he went back on the Lisinopril ).  HPI  Patient is here today for 6 mo follow up     Pt reports that he did finally get a new cpap machine through [facility].   He reports that that is going well.     Back in July patient had a repeat aortic procedure. Patient is now s/p TF ViViv TAVR via 14 Fr R CFA with a 26mm Loretta 3 Ultra and was discharged on 7/11/24 to SNF. He is back in assisted living.     Reports that overall he is feeling good.     He developed a rash on the losartan that gave him the rash. Back on lisinopril.  Dced that and it is all gone.     Back on Lisinopril now; pt unsure on dosing, bp elevated today.     Review of Systems   Constitutional:  Negative for activity change, appetite change, chills and fatigue.   HENT:  Negative for congestion, postnasal drip, sinus pressure, sinus pain and sore throat.    Respiratory:  Negative for cough, shortness of breath and wheezing.    Cardiovascular:  Negative for chest pain and leg swelling.   Gastrointestinal:  Negative for abdominal distention, diarrhea, nausea and vomiting.   Musculoskeletal:  Negative for back pain.   Neurological:  Negative for weakness and numbness.       Objective   /86   Pulse 81   Ht 1.702 m (5' 7\")   Wt 97.5 kg (215 lb)   BMI 33.67 kg/m²     Physical Exam  Constitutional:       General: He is not in acute distress.     Appearance: Normal appearance.   HENT:      Head: Normocephalic.      Nose: Nose normal.      Mouth/Throat:      Pharynx: No oropharyngeal exudate.   Eyes:      General:         Right eye: No discharge.         Left eye: No discharge.      Extraocular Movements: Extraocular movements " intact.      Pupils: Pupils are equal, round, and reactive to light.   Cardiovascular:      Rate and Rhythm: Normal rate and regular rhythm.      Heart sounds: No murmur heard.     No gallop.   Pulmonary:      Effort: Pulmonary effort is normal. No respiratory distress.      Breath sounds: Normal breath sounds. No wheezing.   Musculoskeletal:         General: No swelling. Normal range of motion.   Skin:     General: Skin is warm and dry.      Coloration: Skin is not jaundiced.   Neurological:      General: No focal deficit present.      Mental Status: He is alert and oriented to person, place, and time.      Cranial Nerves: No cranial nerve deficit.   Psychiatric:         Mood and Affect: Mood normal.         Behavior: Behavior normal.           Assessment/Plan   Problem List Items Addressed This Visit       Anxiety    Atrial fibrillation (Multi) - Primary    Benign prostatic hyperplasia with urinary obstruction and other lower urinary tract symptoms    Class 2 severe obesity with body mass index (BMI) of 35 to 39.9 with serious comorbidity    Depression    Essential hypertension    GERD (gastroesophageal reflux disease)    Chronic systolic congestive heart failure    Aortic root dilatation (CMS-HCC)    Spondylosis of lumbar region without myelopathy or radiculopathy    CHF (congestive heart failure), NYHA class I, acute on chronic, combined    Stage 3a chronic kidney disease (Multi)    SUNSHINE (obstructive sleep apnea)      Immunizations   Flu shot 2024  COVID recommended   PNA 23   Shingles recommended   RSV recommended     PSA   Colon cancer screening     1. 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 and she generally does an in person appt when she visits in January   - continue Eliquis   - continue statin   - continue norvasc 5mg po daily   - continue lisinopril (Lippy to reach out and confirm whether taking 10 or 20mg daily, would recommend increasing it and check bp daily x 2 weeks and call with numbers   - continue metoprolol xl 25mg 0.5 tab daily   - cmp order in system     2.  Depression, stable  - seeing Dr Farmer at [facility], has an appt first week in Nov.   - continue Wellbutrin  - methylphenidate on 5mg po daily, CSA and UDS up to date   - I have personally reviewed this patient's OARRS report and found it to be appropriate. The report has been uploaded into the medical record. I have considered the risks of abuse, addiction, dependence, and diversion and feel that it is clinically appropriate for this patient to be prescribed this controlled medication.   - trazodone prn     3. GERD   - continue omeprazole 20mg po daily      Final diagnoses:   [I48.21] Permanent atrial fibrillation (Multi)   [I77.810] Aortic root dilatation (CMS-HCC)   [I50.43] CHF (congestive heart failure), NYHA class I, acute on chronic, combined   [I50.22] Chronic systolic congestive heart failure   [I10] Essential hypertension   [E66.812, E66.01] Class 2 severe obesity with body mass index (BMI) of 35 to 39.9 with serious comorbidity   [K21.9] Gastroesophageal reflux disease without esophagitis   [N40.1, N13.8] Benign prostatic hyperplasia with urinary obstruction and other lower urinary tract symptoms   [N18.31] Stage 3a chronic kidney disease (Multi)   [F33.41] Recurrent major depressive disorder, in partial remission (CMS-HCC)   [F41.9] Anxiety   [M47.816] Spondylosis of lumbar region without myelopathy or radiculopathy   [G47.33] SUNSHINE (obstructive sleep apnea)

## 2024-11-04 DIAGNOSIS — F90.9 ATTENTION DEFICIT HYPERACTIVITY DISORDER (ADHD), UNSPECIFIED ADHD TYPE: ICD-10-CM

## 2024-11-04 PROCEDURE — RXMED WILLOW AMBULATORY MEDICATION CHARGE

## 2024-11-04 RX ORDER — METHYLPHENIDATE HYDROCHLORIDE 5 MG/1
5 TABLET ORAL DAILY
Qty: 30 TABLET | Refills: 0 | Status: SHIPPED | OUTPATIENT
Start: 2024-11-04

## 2024-11-05 ENCOUNTER — PHARMACY VISIT (OUTPATIENT)
Dept: PHARMACY | Facility: CLINIC | Age: 79
End: 2024-11-05
Payer: COMMERCIAL

## 2024-11-05 ENCOUNTER — OFFICE VISIT (OUTPATIENT)
Dept: PRIMARY CARE | Facility: CLINIC | Age: 79
End: 2024-11-05
Payer: MEDICARE

## 2024-11-05 VITALS
WEIGHT: 215 LBS | HEART RATE: 70 BPM | DIASTOLIC BLOOD PRESSURE: 79 MMHG | HEIGHT: 67 IN | BODY MASS INDEX: 33.74 KG/M2 | SYSTOLIC BLOOD PRESSURE: 135 MMHG

## 2024-11-05 DIAGNOSIS — E66.01 CLASS 2 SEVERE OBESITY WITH BODY MASS INDEX (BMI) OF 35 TO 39.9 WITH SERIOUS COMORBIDITY: ICD-10-CM

## 2024-11-05 DIAGNOSIS — I77.810 AORTIC ROOT DILATATION (CMS-HCC): ICD-10-CM

## 2024-11-05 DIAGNOSIS — F33.42 RECURRENT MAJOR DEPRESSIVE DISORDER, IN FULL REMISSION (CMS-HCC): ICD-10-CM

## 2024-11-05 DIAGNOSIS — Z79.899 CONTROLLED SUBSTANCE AGREEMENT SIGNED: Primary | ICD-10-CM

## 2024-11-05 DIAGNOSIS — I50.22 CHRONIC SYSTOLIC CONGESTIVE HEART FAILURE: ICD-10-CM

## 2024-11-05 DIAGNOSIS — F90.2 ATTENTION DEFICIT HYPERACTIVITY DISORDER (ADHD), COMBINED TYPE: ICD-10-CM

## 2024-11-05 DIAGNOSIS — N52.9 ERECTILE DYSFUNCTION, UNSPECIFIED ERECTILE DYSFUNCTION TYPE: ICD-10-CM

## 2024-11-05 DIAGNOSIS — N18.31 STAGE 3A CHRONIC KIDNEY DISEASE (MULTI): ICD-10-CM

## 2024-11-05 DIAGNOSIS — Z13.220 SCREENING FOR LIPID DISORDERS: ICD-10-CM

## 2024-11-05 DIAGNOSIS — I10 ESSENTIAL HYPERTENSION: ICD-10-CM

## 2024-11-05 DIAGNOSIS — I25.10 CORONARY ARTERY DISEASE INVOLVING NATIVE CORONARY ARTERY OF NATIVE HEART WITHOUT ANGINA PECTORIS: ICD-10-CM

## 2024-11-05 DIAGNOSIS — F41.9 ANXIETY: ICD-10-CM

## 2024-11-05 DIAGNOSIS — K21.9 GASTROESOPHAGEAL REFLUX DISEASE WITHOUT ESOPHAGITIS: ICD-10-CM

## 2024-11-05 DIAGNOSIS — I48.11 LONGSTANDING PERSISTENT ATRIAL FIBRILLATION (MULTI): ICD-10-CM

## 2024-11-05 DIAGNOSIS — F51.01 PRIMARY INSOMNIA: ICD-10-CM

## 2024-11-05 DIAGNOSIS — E66.812 CLASS 2 SEVERE OBESITY WITH BODY MASS INDEX (BMI) OF 35 TO 39.9 WITH SERIOUS COMORBIDITY: ICD-10-CM

## 2024-11-05 DIAGNOSIS — Z00.00 MEDICARE ANNUAL WELLNESS VISIT, SUBSEQUENT: ICD-10-CM

## 2024-11-05 DIAGNOSIS — B37.9 CANDIDA INFECTION: ICD-10-CM

## 2024-11-05 LAB
AMPHETAMINES UR QL SCN: NORMAL
BARBITURATES UR QL SCN: NORMAL
BZE UR QL SCN: NORMAL
CANNABINOIDS UR QL SCN: NORMAL
CREAT UR-MCNC: 53.7 MG/DL (ref 20–370)
PCP UR QL SCN: NORMAL

## 2024-11-05 PROCEDURE — 82570 ASSAY OF URINE CREATININE: CPT

## 2024-11-05 PROCEDURE — 80358 DRUG SCREENING METHADONE: CPT

## 2024-11-05 PROCEDURE — 3078F DIAST BP <80 MM HG: CPT | Performed by: INTERNAL MEDICINE

## 2024-11-05 PROCEDURE — 80307 DRUG TEST PRSMV CHEM ANLYZR: CPT

## 2024-11-05 PROCEDURE — 80354 DRUG SCREENING FENTANYL: CPT

## 2024-11-05 PROCEDURE — 1157F ADVNC CARE PLAN IN RCRD: CPT | Performed by: INTERNAL MEDICINE

## 2024-11-05 PROCEDURE — 3075F SYST BP GE 130 - 139MM HG: CPT | Performed by: INTERNAL MEDICINE

## 2024-11-05 PROCEDURE — 1158F ADVNC CARE PLAN TLK DOCD: CPT | Performed by: INTERNAL MEDICINE

## 2024-11-05 PROCEDURE — G0439 PPPS, SUBSEQ VISIT: HCPCS | Performed by: INTERNAL MEDICINE

## 2024-11-05 PROCEDURE — 1036F TOBACCO NON-USER: CPT | Performed by: INTERNAL MEDICINE

## 2024-11-05 PROCEDURE — 80361 OPIATES 1 OR MORE: CPT

## 2024-11-05 PROCEDURE — 1123F ACP DISCUSS/DSCN MKR DOCD: CPT | Performed by: INTERNAL MEDICINE

## 2024-11-05 PROCEDURE — RXMED WILLOW AMBULATORY MEDICATION CHARGE

## 2024-11-05 PROCEDURE — 1159F MED LIST DOCD IN RCRD: CPT | Performed by: INTERNAL MEDICINE

## 2024-11-05 PROCEDURE — 99214 OFFICE O/P EST MOD 30 MIN: CPT | Performed by: INTERNAL MEDICINE

## 2024-11-05 PROCEDURE — 80365 DRUG SCREENING OXYCODONE: CPT

## 2024-11-05 PROCEDURE — 80346 BENZODIAZEPINES1-12: CPT

## 2024-11-05 PROCEDURE — 1160F RVW MEDS BY RX/DR IN RCRD: CPT | Performed by: INTERNAL MEDICINE

## 2024-11-05 PROCEDURE — 80368 SEDATIVE HYPNOTICS: CPT

## 2024-11-05 PROCEDURE — 80373 DRUG SCREENING TRAMADOL: CPT

## 2024-11-05 RX ORDER — NYSTATIN 100000 U/G
CREAM TOPICAL 2 TIMES DAILY
Qty: 30 G | Refills: 0 | Status: SHIPPED | OUTPATIENT
Start: 2024-11-05 | End: 2024-11-12

## 2024-11-05 RX ORDER — BUPROPION HYDROCHLORIDE 300 MG/1
300 TABLET ORAL DAILY
Qty: 90 TABLET | Refills: 0 | OUTPATIENT
Start: 2024-11-05

## 2024-11-05 ASSESSMENT — PATIENT HEALTH QUESTIONNAIRE - PHQ9
2. FEELING DOWN, DEPRESSED OR HOPELESS: SEVERAL DAYS
1. LITTLE INTEREST OR PLEASURE IN DOING THINGS: SEVERAL DAYS
SUM OF ALL RESPONSES TO PHQ9 QUESTIONS 1 AND 2: 2

## 2024-11-05 NOTE — PROGRESS NOTES
Chief Complaint: Medicare Wellness Exam/Comprehensive Problem Focused Follow Up and Physical Exam    HPI:  Patient is here today for MWV.     Pt reports that the last few weeks with blood pressure changes his blood pressure has been in the 130s/80s. Not having spikes anymore,   Using new cpap machine.       Active Problem List  Patient Active Problem List   Diagnosis    ADD (attention deficit disorder)    Anxiety    Atrial fibrillation (Multi)    Benign prostatic hyperplasia with urinary obstruction and other lower urinary tract symptoms    Class 2 severe obesity with body mass index (BMI) of 35 to 39.9 with serious comorbidity    Depression    Erectile dysfunction    Essential hypertension    GERD (gastroesophageal reflux disease)    Chronic systolic congestive heart failure    Aortic root dilatation (CMS-HCC)    Spondylosis of lumbar region without myelopathy or radiculopathy    COVID-19    Hospital discharge follow-up    Right hand weakness    Recurrent major depressive disorder, in partial remission (CMS-[facility])    CHF (congestive heart failure), NYHA class I, acute on chronic, combined    Stage 3a chronic kidney disease (Multi)    Diarrhea    SUNSHINE (obstructive sleep apnea)       Comprehensive Medical/Surgical/Social/Family History  Past Medical History:   Diagnosis Date    Hypertension     Personal history of other diseases of the circulatory system     History of congestive heart failure     Past Surgical History:   Procedure Laterality Date    CARDIAC SURGERY      OTHER SURGICAL HISTORY  2019    Nose surgery    OTHER SURGICAL HISTORY  2019    Heart surgery     Social History     Tobacco Use    Smoking status: Former     Current packs/day: 0.00     Types: Cigarettes     Quit date: 1989     Years since quittin.0    Smokeless tobacco: Never   Vaping Use    Vaping status: Never Used   Substance Use Topics    Alcohol use: Not Currently    Drug use: Never     No family history on  file.      Allergies and Medications  Penicillins, House dust, and Losartan  Current Outpatient Medications on File Prior to Visit   Medication Sig Dispense Refill    amLODIPine (Norvasc) 5 mg tablet Take 1 tablet (5 mg) by mouth once daily. 90 tablet 3    apixaban (Eliquis) 5 mg tablet Take 1 tablet (5 mg) by mouth 2 times a day. 180 tablet 3    aspirin 81 mg EC tablet Take 1 tablet (81 mg) by mouth once daily.      atorvastatin (Lipitor) 40 mg tablet Take 1 tablet (40 mg) by mouth once daily at bedtime. 90 tablet 3    buPROPion SR (Wellbutrin SR) 100 mg 12 hr tablet Take 1 Tablet By Mouth Daily; (Patient not taking: Reported on 10/17/2024) 30 tablet 1    buPROPion SR (Wellbutrin SR) 150 mg 12 hr tablet Take 1 tablet (150 mg) by mouth 2 times a day. Do not crush, chew, or split.      buPROPion SR (Wellbutrin SR) 200 mg 12 hr tablet Take 1 tablet (200 mg) by mouth once daily. Do not crush, chew, or split. 90 tablet 1    fexofenadine (Allegra Allergy) 180 mg tablet Take one pill twice daily 60 tablet 1    furosemide (Lasix) 20 mg tablet Take 1 tablet (20 mg) by mouth once daily. 90 tablet 0    lisinopril 20 mg tablet Take 1 tablet (20 mg) by mouth once daily. 100 tablet 3    melatonin 5 mg tablet,disintegrating Take 1 tablet by mouth once daily at bedtime.      methylphenidate (Ritalin) 5 mg tablet Take 1 tablet (5 mg) by mouth once daily. 30 tablet 0    metoprolol succinate XL (Toprol-XL) 25 mg 24 hr tablet Take 0.5 tablets (12.5 mg) by mouth once daily. Do not crush or chew. 45 tablet 3    omeprazole (PriLOSEC) 20 mg DR capsule Take 1 capsule (20 mg) by mouth once daily. 90 capsule 3    traZODone (Desyrel) 50 mg tablet Take 0.5 tablets (25 mg) by mouth as needed at bedtime for sleep. 30 tablet 1    [DISCONTINUED] methylphenidate (Ritalin) 5 mg tablet Take 1 tablet (5 mg) by mouth once daily. 30 tablet 0     No current facility-administered medications on file prior to visit.       Medicare Wellness  Questionnaire        How have you been on Medicare less than a year ? No  Have you had a Medicare Wellness exam before ? Yes  Have you had any surgeries in the last year ? Yes  Have you developed any new diseases in the last year ? No  Have any close family members developed new diseases in the last year ? No  Have you been to a the hospital in the last year ? No  Do you take any pills or supplements other than those prescribed for you ? Yes  Do you take any opiates for pain such as Tramadol, Percocet or Norco ? No  How do you consider your overall health ?Good  Have you ever used tobacco products ? Yes   Have you smoked more than 100 cigarettes in your life ?  Yes  What form of tobacco have you used ? Cigarettes   How many packs per day ? Smoked pipe on and off   How many years ? Smoked cigarettes for a few years in 20s, 1 ppd   Have you quit ? Yes - quit in the 1990s   Do you drink alcohol ?  No   Have you ever used illegal drugs at anytime in your life including Marijuana ? No   Which of the following describes your diet ?Well balanced and Heart healthy  How many days per week on average do you exercise ? Walking 2 miles 3-5 x a week days  Do you have any loss of hearing ? Yes  Do you have hearing aids ? no  Have you or others noted you have loss of memory ? No  Do you need someone to assist you with any of the following ? None   Do you need someone to assist you with any of the following ? None   Have you fallen in the last 6 months ? No  Do you have any of the following in your house ? None   Do you have a living will ? Yes  Do you have a durable power of  for health care decisions ? Yes    Medications and Supplements  prescribed by me and other practitioners or clinical pharmacist (such as prescriptions, OTC's, herbal therapies and supplements) were reviewed and documented in the medical record.    Tobacco/Alcohol/Opioid use, as well as Illicit Drug Use was screened for/reviewed and documented in  "Social History section and medication list as appropriate  Activities of Daily Living  In your present state of health, do you have any difficulty performing the following activities?:   Preparing food and eating?: No  Bathing yourself: No  Getting dressed: No  Using the toilet:No  Moving around from place to place: No  In the past year have you fallen or had a near fall?:No    Depression Screen  (Note: if answer to either of the following is \"Yes\", then a more complete depression screening is indicated)   Q1: Over the past two weeks, have you felt down, depressed or hopeless?   No   Q2: Over the past two weeks, have you felt little interest or pleasure in doing things?   No     Doing ok on current medications     Current exercise habits: Home exercise routine includes walking 1  hrs per day.   Dietary issues discussed: Yes  Hearing difficulties: No  Safe in current home environment: yes  Visual Acuity assessed: no  Cognitive Impairment assessed: yes       Advance directives  Advanced Care Planning (including a Living Will, Healthcare POA, as well as specific end of life choices and/or directives), was discussed for approximately 1 minutes with the patient and/or surrogate, voluntarily, and documented in the medical record.     Cardiac Risk Assessment  Cardiovascular risk was discussed and, if needed, lifestyle modifications recommended, including nutritional choices, exercise, and elimination of habits contributing to risk. We agreed on a plan to reduce the current cardiovascular risk based on above discussion as needed.  Aspirin use/disuse was discussed after reviewing the updated guidelines below:    Consider low dose Aspirin ( mg) use if the benefit for cardiovascular disease prevention outweighs risk for bleeding complications.   In general, low dose ASA should be considered:  In patients WITHOUT prior MI/stroke/PAD (primary prevention):   a. Age <60: Use if 10-year cardiovascular disease risk >20%, with " "discussion of risks and benefits with patient  b. Age 60-<70: Use if 10-year cardiovascular disease risk >20% and low bleeding (e.g., gastrointenstinal) risk, with discussion of risks and benefits with patient  c. Age >=70: Do not use    In patients WITH prior MI/stroke/PAD (secondary prevention):   Generally use unless extremely high bleeding (e.g., gastrointenstinal) risk, with discussion of risks and benefits with patient    ROS otherwise negative aside from what was mentioned above in HPI.    Vitals  /79   Pulse 70   Ht 1.702 m (5' 7\")   Wt 97.5 kg (215 lb)   BMI 33.67 kg/m²   Body mass index is 33.67 kg/m².  Physical Exam  Gen: Alert, NAD  HEENT:  PERRLA, EOMI, conjunctiva and sclera normal in appearance.   Neck:  Supple with FROM; No masses/nodes palpable; Thyroid nontender and without nodules; No JUNI  Respiratory:  Lungs CTAB  Cardiovascular:  Heart RRR. No M/R/G. Peripheral pulses equal bilaterally, no edema on exam today, dry skin   Abdomen:  Soft, nontender, BS present throughout; No R/G/R; No HSM or masses palpated  Extremities:  FROM all extremities; Muscle strength grossly normal with good tone  Neuro:  CN II-XII intact; Reflexes 2+/2+; Gross motor and sensory intact  Skin:  No suspicious lesions present, some redness in right groin fold consistent with candida, incision from cath healed well     OARRS:  No data recorded  I have personally reviewed the OARRS report for Og Cooper. I have considered the risks of abuse, dependence, addiction and diversion and I believe that it is clinically appropriate for Og Cooper to be prescribed this medication    Is the patient prescribed a combination of a benzodiazepine and opioid?  No    Last Urine Drug Screen / ordered today: Yes  No results found for this or any previous visit (from the past 8760 hours).  N/A          Controlled Substance Agreement:  Date of the Last Agreement: 11/05/2024  Reviewed Controlled Substance Agreement including but " not limited to the benefits, risks, and alternatives to treatment with a Controlled Substance medication(s).    Stimulants:   What is the patient's goal of therapy? Improvement depression   Is this being achieved with current treatment? Yes     Activities of Daily Living:   Is your overall impression that this patient is benefiting (symptom reduction outweighs side effects) from stimulant therapy? Yes     1. Physical Functioning: Better  2. Family Relationship: Better  3. Social Relationship: Better  4. Mood: Better  5. Sleep Patterns: Better  6. Overall Function: Better    Assessment and Plan:  Problem List Items Addressed This Visit       ADD (attention deficit disorder)    Overview     Formatting of this note might be different from the original. 4/14/2016 Requests refill of concerta 27mg QD. Doing well on current dose w/out any adverse effects.         Anxiety    Overview     Formatting of this note might be different from the original. 8/25/2015: Pt reports stress and anxiety has worsened significantly in the past few weeks, mostly due to family issues and adjusting to residential. Reports he has been waking up earlier. Sees therapist in [place] twice weekly. Seeking further help with a psychiatrist; has appointment with Dr. Gruber 10/2015. Pt notes he is using a lot of support systems. Started Wellbutrin a few weeks ago.         Atrial fibrillation (Multi)    Overview     Formatting of this note might be different from the original. 8/25/2015: Rhythm control. Cardioversion 1.5 years ago did help. Atrial Fibrillation Follow Up Pertinent medications, labs, and studies reviewed. S/p two ablations. Under control on meds. Medication: taking regularly Medication Side Effects:  none Symptoms:  none Frequency:  none         Class 2 severe obesity with body mass index (BMI) of 35 to 39.9 with serious comorbidity    Depression    Erectile dysfunction    Essential hypertension    GERD (gastroesophageal reflux disease)     Chronic systolic congestive heart failure    Aortic root dilatation (CMS-HCC)    Stage 3a chronic kidney disease (Multi)     Other Visit Diagnoses       Controlled substance agreement signed    -  Primary    Relevant Orders    Opiate/Opioid/Benzo Prescription Compliance    OOB Internal Tracking    Screening for lipid disorders        Relevant Orders    Lipid Panel    Candida infection        Relevant Medications    nystatin (Mycostatin) cream    Primary insomnia        Coronary artery disease involving native coronary artery of native heart without angina pectoris        Medicare annual wellness visit, subsequent              Immunizations   Flu shot 2024  COVID recommended   PNA 23   Shingles recommended   RSV recommended      PSA   Colon cancer screening      1. 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  Depression, stable  - seeing Dr Farmer at [facility], has an appt first week in Nov.   - continue Wellbutrin  - methylphenidate on 5mg po daily, CSA and UDS up dated today   - I have personally reviewed this patient's OARRS report and found it to be appropriate. The report has been uploaded into the medical record. I have considered the risks of abuse, addiction, dependence, and diversion and feel that it is clinically appropriate for this patient to be prescribed this controlled medication.   - trazodone prn      3. GERD   - continue omeprazole 20mg po daily          4. Obesity, BMI 33   - pt interested in losing weight   - discussed that medicare does not cover weight loss meds   - can look into compounding pharmacies if he is really interested, can daught about it with his daughters   - has  tried wellbutrin   - could see dietician      5. Candida, sent nystatin       Addendum: 12/12/24   Pt has severe kaya and has been using cpap, AHI initially 43 and with maximum settings on CPAP have iproved to 19 and now 14, will wait another month and see if AHI continues to improve and if not will change to bipap.     During the course of the visit the patient was educated and counseled about age appropriate screening and preventive services. Completed preventive screenings were documented in the chart and orders were placed for outstanding screenings/procedures as documented in the Assessment and Plan.      Patient Instructions (the written plan) was given to the patient at check out.      Amy Mart, DO

## 2024-11-08 ENCOUNTER — LAB (OUTPATIENT)
Dept: LAB | Facility: LAB | Age: 79
End: 2024-11-08
Payer: MEDICARE

## 2024-11-08 DIAGNOSIS — D69.6 THROMBOCYTOPENIA (CMS-HCC): ICD-10-CM

## 2024-11-08 DIAGNOSIS — R19.7 DIARRHEA, UNSPECIFIED TYPE: ICD-10-CM

## 2024-11-08 DIAGNOSIS — Z13.220 SCREENING FOR LIPID DISORDERS: ICD-10-CM

## 2024-11-08 LAB
1OH-MIDAZOLAM UR CFM-MCNC: <25 NG/ML
6MAM UR CFM-MCNC: <25 NG/ML
7AMINOCLONAZEPAM UR CFM-MCNC: <25 NG/ML
A-OH ALPRAZ UR CFM-MCNC: <25 NG/ML
ALBUMIN SERPL BCP-MCNC: 4.2 G/DL (ref 3.4–5)
ALP SERPL-CCNC: 103 U/L (ref 33–136)
ALPRAZ UR CFM-MCNC: <25 NG/ML
ALT SERPL W P-5'-P-CCNC: 18 U/L (ref 10–52)
ANION GAP SERPL CALC-SCNC: 10 MMOL/L (ref 10–20)
AST SERPL W P-5'-P-CCNC: 22 U/L (ref 9–39)
BASOPHILS # BLD AUTO: 0.04 X10*3/UL (ref 0–0.1)
BASOPHILS NFR BLD AUTO: 0.5 %
BILIRUB SERPL-MCNC: 0.5 MG/DL (ref 0–1.2)
BUN SERPL-MCNC: 33 MG/DL (ref 6–23)
CALCIUM SERPL-MCNC: 9.3 MG/DL (ref 8.6–10.3)
CHLORDIAZEP UR CFM-MCNC: <25 NG/ML
CHLORIDE SERPL-SCNC: 107 MMOL/L (ref 98–107)
CHOLEST SERPL-MCNC: 138 MG/DL (ref 0–199)
CHOLESTEROL/HDL RATIO: 3.3
CLONAZEPAM UR CFM-MCNC: <25 NG/ML
CO2 SERPL-SCNC: 29 MMOL/L (ref 21–32)
CODEINE UR CFM-MCNC: <50 NG/ML
CREAT SERPL-MCNC: 1.48 MG/DL (ref 0.5–1.3)
DIAZEPAM UR CFM-MCNC: <25 NG/ML
EDDP UR CFM-MCNC: <25 NG/ML
EGFRCR SERPLBLD CKD-EPI 2021: 48 ML/MIN/1.73M*2
EOSINOPHIL # BLD AUTO: 0.67 X10*3/UL (ref 0–0.4)
EOSINOPHIL NFR BLD AUTO: 7.9 %
ERYTHROCYTE [DISTWIDTH] IN BLOOD BY AUTOMATED COUNT: 14.3 % (ref 11.5–14.5)
FENTANYL UR CFM-MCNC: <2.5 NG/ML
GLUCOSE SERPL-MCNC: 89 MG/DL (ref 74–99)
HCT VFR BLD AUTO: 42.2 % (ref 41–52)
HDLC SERPL-MCNC: 42 MG/DL
HGB BLD-MCNC: 12.8 G/DL (ref 13.5–17.5)
HYDROCODONE CTO UR CFM-MCNC: <25 NG/ML
HYDROMORPHONE UR CFM-MCNC: <25 NG/ML
IMM GRANULOCYTES # BLD AUTO: 0.05 X10*3/UL (ref 0–0.5)
IMM GRANULOCYTES NFR BLD AUTO: 0.6 % (ref 0–0.9)
LDLC SERPL CALC-MCNC: 71 MG/DL
LORAZEPAM UR CFM-MCNC: <25 NG/ML
LYMPHOCYTES # BLD AUTO: 1.55 X10*3/UL (ref 0.8–3)
LYMPHOCYTES NFR BLD AUTO: 18.3 %
MCH RBC QN AUTO: 26.1 PG (ref 26–34)
MCHC RBC AUTO-ENTMCNC: 30.3 G/DL (ref 32–36)
MCV RBC AUTO: 86 FL (ref 80–100)
METHADONE UR CFM-MCNC: <25 NG/ML
MIDAZOLAM UR CFM-MCNC: <25 NG/ML
MONOCYTES # BLD AUTO: 0.7 X10*3/UL (ref 0.05–0.8)
MONOCYTES NFR BLD AUTO: 8.3 %
MORPHINE UR CFM-MCNC: <50 NG/ML
NEUTROPHILS # BLD AUTO: 5.47 X10*3/UL (ref 1.6–5.5)
NEUTROPHILS NFR BLD AUTO: 64.4 %
NON HDL CHOLESTEROL: 96 MG/DL (ref 0–149)
NORDIAZEPAM UR CFM-MCNC: <25 NG/ML
NORFENTANYL UR CFM-MCNC: <2.5 NG/ML
NORHYDROCODONE UR CFM-MCNC: <25 NG/ML
NOROXYCODONE UR CFM-MCNC: <25 NG/ML
NORTRAMADOL UR-MCNC: <50 NG/ML
NRBC BLD-RTO: 0 /100 WBCS (ref 0–0)
OXAZEPAM UR CFM-MCNC: <25 NG/ML
OXYCODONE UR CFM-MCNC: <25 NG/ML
OXYMORPHONE UR CFM-MCNC: <25 NG/ML
PLATELET # BLD AUTO: 160 X10*3/UL (ref 150–450)
POTASSIUM SERPL-SCNC: 4.7 MMOL/L (ref 3.5–5.3)
PROT SERPL-MCNC: 6.7 G/DL (ref 6.4–8.2)
RBC # BLD AUTO: 4.9 X10*6/UL (ref 4.5–5.9)
SODIUM SERPL-SCNC: 141 MMOL/L (ref 136–145)
TEMAZEPAM UR CFM-MCNC: <25 NG/ML
TRAMADOL UR CFM-MCNC: <50 NG/ML
TRIGL SERPL-MCNC: 124 MG/DL (ref 0–149)
VLDL: 25 MG/DL (ref 0–40)
WBC # BLD AUTO: 8.5 X10*3/UL (ref 4.4–11.3)
ZOLPIDEM UR CFM-MCNC: <25 NG/ML
ZOLPIDEM UR-MCNC: <25 NG/ML

## 2024-11-08 PROCEDURE — 80061 LIPID PANEL: CPT

## 2024-11-08 PROCEDURE — 85025 COMPLETE CBC W/AUTO DIFF WBC: CPT

## 2024-11-08 PROCEDURE — 80053 COMPREHEN METABOLIC PANEL: CPT

## 2024-11-08 PROCEDURE — 36415 COLL VENOUS BLD VENIPUNCTURE: CPT

## 2024-11-12 ENCOUNTER — APPOINTMENT (OUTPATIENT)
Dept: PRIMARY CARE | Facility: CLINIC | Age: 79
End: 2024-11-12
Payer: MEDICARE

## 2024-11-18 PROCEDURE — RXMED WILLOW AMBULATORY MEDICATION CHARGE

## 2024-11-21 PROCEDURE — RXMED WILLOW AMBULATORY MEDICATION CHARGE

## 2024-11-22 DIAGNOSIS — R35.1 NOCTURIA: ICD-10-CM

## 2024-11-26 ENCOUNTER — PHARMACY VISIT (OUTPATIENT)
Dept: PHARMACY | Facility: CLINIC | Age: 79
End: 2024-11-26
Payer: COMMERCIAL

## 2024-12-02 DIAGNOSIS — F90.9 ATTENTION DEFICIT HYPERACTIVITY DISORDER (ADHD), UNSPECIFIED ADHD TYPE: ICD-10-CM

## 2024-12-02 DIAGNOSIS — I10 ESSENTIAL HYPERTENSION: Primary | ICD-10-CM

## 2024-12-02 PROCEDURE — RXMED WILLOW AMBULATORY MEDICATION CHARGE

## 2024-12-02 RX ORDER — LISINOPRIL 10 MG/1
10 TABLET ORAL DAILY
Qty: 90 TABLET | Refills: 3 | Status: SHIPPED | OUTPATIENT
Start: 2024-12-02 | End: 2025-12-02

## 2024-12-02 RX ORDER — METHYLPHENIDATE HYDROCHLORIDE 5 MG/1
5 TABLET ORAL DAILY
Qty: 30 TABLET | Refills: 0 | Status: SHIPPED | OUTPATIENT
Start: 2024-12-02

## 2024-12-05 ENCOUNTER — PHARMACY VISIT (OUTPATIENT)
Dept: PHARMACY | Facility: CLINIC | Age: 79
End: 2024-12-05
Payer: COMMERCIAL

## 2024-12-05 PROCEDURE — RXMED WILLOW AMBULATORY MEDICATION CHARGE

## 2024-12-30 DIAGNOSIS — I50.43 CHF (CONGESTIVE HEART FAILURE), NYHA CLASS I, ACUTE ON CHRONIC, COMBINED: ICD-10-CM

## 2024-12-30 RX ORDER — FUROSEMIDE 20 MG/1
20 TABLET ORAL DAILY
Qty: 90 TABLET | Refills: 0 | Status: SHIPPED | OUTPATIENT
Start: 2024-12-30 | End: 2025-04-03

## 2025-01-03 PROCEDURE — RXMED WILLOW AMBULATORY MEDICATION CHARGE

## 2025-01-06 ENCOUNTER — PHARMACY VISIT (OUTPATIENT)
Dept: PHARMACY | Facility: CLINIC | Age: 80
End: 2025-01-06
Payer: COMMERCIAL

## 2025-01-06 DIAGNOSIS — F32.A DEPRESSION, UNSPECIFIED: ICD-10-CM

## 2025-01-06 DIAGNOSIS — F90.9 ATTENTION DEFICIT HYPERACTIVITY DISORDER (ADHD), UNSPECIFIED ADHD TYPE: ICD-10-CM

## 2025-01-06 PROCEDURE — RXMED WILLOW AMBULATORY MEDICATION CHARGE

## 2025-01-06 RX ORDER — METHYLPHENIDATE HYDROCHLORIDE 5 MG/1
5 TABLET ORAL DAILY
Qty: 30 TABLET | Refills: 0 | Status: SHIPPED | OUTPATIENT
Start: 2025-01-06

## 2025-01-14 PROCEDURE — RXMED WILLOW AMBULATORY MEDICATION CHARGE

## 2025-01-23 ENCOUNTER — APPOINTMENT (OUTPATIENT)
Dept: PRIMARY CARE | Facility: CLINIC | Age: 80
End: 2025-01-23
Payer: MEDICARE

## 2025-01-24 ENCOUNTER — LAB (OUTPATIENT)
Dept: LAB | Facility: LAB | Age: 80
End: 2025-01-24
Payer: MEDICARE

## 2025-01-24 DIAGNOSIS — R35.1 NOCTURIA: ICD-10-CM

## 2025-01-24 DIAGNOSIS — F90.9 ATTENTION DEFICIT HYPERACTIVITY DISORDER (ADHD), UNSPECIFIED ADHD TYPE: ICD-10-CM

## 2025-01-24 PROCEDURE — 84153 ASSAY OF PSA TOTAL: CPT

## 2025-01-24 RX ORDER — METHYLPHENIDATE HYDROCHLORIDE 5 MG/1
5 TABLET ORAL DAILY
Qty: 30 TABLET | Refills: 0 | Status: SHIPPED | OUTPATIENT
Start: 2025-01-24

## 2025-01-25 LAB — PSA SERPL-MCNC: 4.98 NG/ML

## 2025-01-27 ENCOUNTER — PHARMACY VISIT (OUTPATIENT)
Dept: PHARMACY | Facility: CLINIC | Age: 80
End: 2025-01-27
Payer: COMMERCIAL

## 2025-01-27 PROCEDURE — RXMED WILLOW AMBULATORY MEDICATION CHARGE

## 2025-01-27 RX ORDER — CLOBETASOL PROPIONATE 0.5 MG/G
CREAM TOPICAL
Qty: 60 G | Refills: 3 | OUTPATIENT
Start: 2025-01-27

## 2025-01-27 RX ORDER — PREDNISONE 10 MG/1
TABLET ORAL
Qty: 21 TABLET | Refills: 0 | OUTPATIENT
Start: 2025-01-27

## 2025-01-29 ENCOUNTER — HOSPITAL ENCOUNTER (EMERGENCY)
Facility: HOSPITAL | Age: 80
Discharge: HOME | End: 2025-01-29
Attending: EMERGENCY MEDICINE
Payer: MEDICARE

## 2025-01-29 ENCOUNTER — APPOINTMENT (OUTPATIENT)
Dept: RADIOLOGY | Facility: HOSPITAL | Age: 80
End: 2025-01-29
Payer: MEDICARE

## 2025-01-29 ENCOUNTER — APPOINTMENT (OUTPATIENT)
Dept: CARDIOLOGY | Facility: HOSPITAL | Age: 80
End: 2025-01-29
Payer: MEDICARE

## 2025-01-29 VITALS
HEIGHT: 67 IN | SYSTOLIC BLOOD PRESSURE: 147 MMHG | DIASTOLIC BLOOD PRESSURE: 94 MMHG | HEART RATE: 70 BPM | BODY MASS INDEX: 33.74 KG/M2 | OXYGEN SATURATION: 95 % | WEIGHT: 215 LBS | RESPIRATION RATE: 16 BRPM

## 2025-01-29 DIAGNOSIS — R42 DIZZINESS: Primary | ICD-10-CM

## 2025-01-29 LAB
ANION GAP SERPL CALC-SCNC: 12 MMOL/L (ref 10–20)
APPEARANCE UR: CLEAR
BASOPHILS # BLD AUTO: 0.02 X10*3/UL (ref 0–0.1)
BASOPHILS NFR BLD AUTO: 0.2 %
BILIRUB UR STRIP.AUTO-MCNC: NEGATIVE MG/DL
BUN SERPL-MCNC: 31 MG/DL (ref 6–23)
CALCIUM SERPL-MCNC: 9.1 MG/DL (ref 8.6–10.3)
CARDIAC TROPONIN I PNL SERPL HS: 25 NG/L (ref 0–20)
CARDIAC TROPONIN I PNL SERPL HS: 26 NG/L (ref 0–20)
CHLORIDE SERPL-SCNC: 105 MMOL/L (ref 98–107)
CO2 SERPL-SCNC: 24 MMOL/L (ref 21–32)
COLOR UR: NORMAL
CREAT SERPL-MCNC: 1.36 MG/DL (ref 0.5–1.3)
EGFRCR SERPLBLD CKD-EPI 2021: 54 ML/MIN/1.73M*2
EOSINOPHIL # BLD AUTO: 0.08 X10*3/UL (ref 0–0.4)
EOSINOPHIL NFR BLD AUTO: 0.7 %
ERYTHROCYTE [DISTWIDTH] IN BLOOD BY AUTOMATED COUNT: 15.6 % (ref 11.5–14.5)
GLUCOSE SERPL-MCNC: 120 MG/DL (ref 74–99)
GLUCOSE UR STRIP.AUTO-MCNC: NORMAL MG/DL
HCT VFR BLD AUTO: 39.2 % (ref 41–52)
HGB BLD-MCNC: 12 G/DL (ref 13.5–17.5)
HOLD SPECIMEN: NORMAL
IMM GRANULOCYTES # BLD AUTO: 0.05 X10*3/UL (ref 0–0.5)
IMM GRANULOCYTES NFR BLD AUTO: 0.5 % (ref 0–0.9)
KETONES UR STRIP.AUTO-MCNC: NEGATIVE MG/DL
LEUKOCYTE ESTERASE UR QL STRIP.AUTO: NEGATIVE
LYMPHOCYTES # BLD AUTO: 0.77 X10*3/UL (ref 0.8–3)
LYMPHOCYTES NFR BLD AUTO: 7.1 %
MCH RBC QN AUTO: 25.9 PG (ref 26–34)
MCHC RBC AUTO-ENTMCNC: 30.6 G/DL (ref 32–36)
MCV RBC AUTO: 85 FL (ref 80–100)
MONOCYTES # BLD AUTO: 0.3 X10*3/UL (ref 0.05–0.8)
MONOCYTES NFR BLD AUTO: 2.8 %
NEUTROPHILS # BLD AUTO: 9.58 X10*3/UL (ref 1.6–5.5)
NEUTROPHILS NFR BLD AUTO: 88.7 %
NITRITE UR QL STRIP.AUTO: NEGATIVE
NRBC BLD-RTO: 0 /100 WBCS (ref 0–0)
PH UR STRIP.AUTO: 5.5 [PH]
PLATELET # BLD AUTO: 162 X10*3/UL (ref 150–450)
POTASSIUM SERPL-SCNC: 4.6 MMOL/L (ref 3.5–5.3)
PROT UR STRIP.AUTO-MCNC: NEGATIVE MG/DL
RBC # BLD AUTO: 4.63 X10*6/UL (ref 4.5–5.9)
RBC # UR STRIP.AUTO: NEGATIVE /UL
SODIUM SERPL-SCNC: 136 MMOL/L (ref 136–145)
SP GR UR STRIP.AUTO: 1.01
UROBILINOGEN UR STRIP.AUTO-MCNC: NORMAL MG/DL
WBC # BLD AUTO: 10.8 X10*3/UL (ref 4.4–11.3)

## 2025-01-29 PROCEDURE — 99285 EMERGENCY DEPT VISIT HI MDM: CPT | Mod: 25 | Performed by: EMERGENCY MEDICINE

## 2025-01-29 PROCEDURE — 84484 ASSAY OF TROPONIN QUANT: CPT | Performed by: EMERGENCY MEDICINE

## 2025-01-29 PROCEDURE — 36415 COLL VENOUS BLD VENIPUNCTURE: CPT | Performed by: EMERGENCY MEDICINE

## 2025-01-29 PROCEDURE — 70450 CT HEAD/BRAIN W/O DYE: CPT

## 2025-01-29 PROCEDURE — 93005 ELECTROCARDIOGRAM TRACING: CPT

## 2025-01-29 PROCEDURE — 71045 X-RAY EXAM CHEST 1 VIEW: CPT

## 2025-01-29 PROCEDURE — 81003 URINALYSIS AUTO W/O SCOPE: CPT | Performed by: EMERGENCY MEDICINE

## 2025-01-29 PROCEDURE — 70450 CT HEAD/BRAIN W/O DYE: CPT | Performed by: RADIOLOGY

## 2025-01-29 PROCEDURE — 85025 COMPLETE CBC W/AUTO DIFF WBC: CPT | Performed by: EMERGENCY MEDICINE

## 2025-01-29 PROCEDURE — 82374 ASSAY BLOOD CARBON DIOXIDE: CPT | Performed by: EMERGENCY MEDICINE

## 2025-01-29 ASSESSMENT — COLUMBIA-SUICIDE SEVERITY RATING SCALE - C-SSRS
2. HAVE YOU ACTUALLY HAD ANY THOUGHTS OF KILLING YOURSELF?: NO
1. IN THE PAST MONTH, HAVE YOU WISHED YOU WERE DEAD OR WISHED YOU COULD GO TO SLEEP AND NOT WAKE UP?: NO
6. HAVE YOU EVER DONE ANYTHING, STARTED TO DO ANYTHING, OR PREPARED TO DO ANYTHING TO END YOUR LIFE?: NO

## 2025-01-29 ASSESSMENT — PAIN SCALES - GENERAL
PAINLEVEL_OUTOF10: 0 - NO PAIN
PAINLEVEL_OUTOF10: 0 - NO PAIN

## 2025-01-29 ASSESSMENT — PAIN - FUNCTIONAL ASSESSMENT: PAIN_FUNCTIONAL_ASSESSMENT: 0-10

## 2025-01-29 NOTE — ED PROVIDER NOTES
HPI   Chief Complaint   Patient presents with    Dizziness     Patient was heading up on the elevator after lunch and began to feel lightheaded and dizzy. Patient denies cp or sob. EKG done at . Patient also denies any recent illness.        Patient presents to the Emergency Department secondary to lightheadedness and dizziness.  This happened transiently after eating lunch and getting in the elevator at his extended care facility.  He states the staff helped him into a chair and called the paramedics.  Prior history of similar symptoms.      History provided by:  Patient and EMS personnel   used: No            Patient History   Past Medical History:   Diagnosis Date    Hypertension     Personal history of other diseases of the circulatory system     History of congestive heart failure     Past Surgical History:   Procedure Laterality Date    CARDIAC SURGERY      OTHER SURGICAL HISTORY  2019    Nose surgery    OTHER SURGICAL HISTORY  2019    Heart surgery     No family history on file.  Social History     Tobacco Use    Smoking status: Former     Current packs/day: 0.00     Types: Cigarettes     Quit date: 1989     Years since quittin.2    Smokeless tobacco: Never   Vaping Use    Vaping status: Never Used   Substance Use Topics    Alcohol use: Not Currently    Drug use: Never       Physical Exam   ED Triage Vitals [25 1329]   Temp Heart Rate Respirations BP   -- 75 18 (!) 155/93      Pulse Ox Temp src Heart Rate Source Patient Position   96 % -- Monitor Sitting      BP Location FiO2 (%)     Left arm --       Physical Exam  Vitals and nursing note reviewed.   Constitutional:       General: He is not in acute distress.     Appearance: Normal appearance. He is obese. He is not ill-appearing, toxic-appearing or diaphoretic.      Comments: Sitting up in bed watching television.  Talkative.  Provides a full history.   HENT:      Head: Normocephalic and atraumatic.       Right Ear: Tympanic membrane, ear canal and external ear normal. There is no impacted cerumen.      Left Ear: Tympanic membrane, ear canal and external ear normal. There is no impacted cerumen.      Nose: Nose normal. No rhinorrhea.   Eyes:      Conjunctiva/sclera: Conjunctivae normal.   Neck:      Comments: Trachea is midline  Cardiovascular:      Rate and Rhythm: Normal rate and regular rhythm.      Heart sounds: No murmur heard.  Pulmonary:      Effort: Pulmonary effort is normal.      Breath sounds: Normal breath sounds. No wheezing.   Abdominal:      General: Abdomen is flat. Bowel sounds are normal. There is no distension.      Palpations: Abdomen is soft.      Tenderness: There is no abdominal tenderness.   Musculoskeletal:         General: Normal range of motion.      Cervical back: Normal range of motion.   Skin:     General: Skin is warm and dry.      Findings: No rash.   Neurological:      General: No focal deficit present.      Mental Status: He is alert and oriented to person, place, and time. Mental status is at baseline.      Cranial Nerves: No cranial nerve deficit.      Sensory: No sensory deficit.   Psychiatric:         Mood and Affect: Mood normal.         Behavior: Behavior normal.         Thought Content: Thought content normal.         Judgment: Judgment normal.           ED Course & MDM   Diagnoses as of 01/29/25 1620   Dizziness                 No data recorded     Jeremiah Coma Scale Score: 15 (01/29/25 1335 : Sarita Cooney RN)                           Medical Decision Making  Twelve-lead EKG reveals a paced rhythm at 70 bpm, rightward axis, normal R wave progression, no acute ischemic changes.    The patient's [employer] device was interrogated and I spoke to the representative who states that the patient had no recent events of any kind.    Patient's workup is unremarkable.  He was ambulated here in the ER without difficulty.  The differential includes transient hypotension, orthostatic  event, amongst many others.  Given the patient's well appearance I feel he is appropriate for discharge.  Follow-up with his private physician and return if worse.        Procedure  Procedures     Kai Mcbride,   01/29/25 1585

## 2025-01-30 ENCOUNTER — APPOINTMENT (OUTPATIENT)
Dept: PRIMARY CARE | Facility: CLINIC | Age: 80
End: 2025-01-30
Payer: MEDICARE

## 2025-01-30 LAB — HOLD SPECIMEN: NORMAL

## 2025-01-31 LAB
ATRIAL RATE: 70 BPM
P AXIS: 55 DEGREES
P OFFSET: 136 MS
P ONSET: 105 MS
PR INTERVAL: 176 MS
Q ONSET: 193 MS
QRS COUNT: 12 BEATS
QRS DURATION: 158 MS
QT INTERVAL: 436 MS
QTC CALCULATION(BAZETT): 470 MS
QTC FREDERICIA: 459 MS
R AXIS: 84 DEGREES
T AXIS: -84 DEGREES
T OFFSET: 411 MS
VENTRICULAR RATE: 70 BPM

## 2025-02-01 PROCEDURE — RXMED WILLOW AMBULATORY MEDICATION CHARGE

## 2025-02-03 ASSESSMENT — ENCOUNTER SYMPTOMS
DIFFICULTY URINATING: 0
EYES NEGATIVE: 1
NAUSEA: 0
COUGH: 0
PSYCHIATRIC NEGATIVE: 1
CHILLS: 0
SHORTNESS OF BREATH: 0
ALLERGIC/IMMUNOLOGIC NEGATIVE: 1
ENDOCRINE NEGATIVE: 1
FEVER: 0

## 2025-02-03 NOTE — PROGRESS NOTES
Subjective   Patient ID: Og Cooper is a 79 y.o. male.    HPI  Patient is here for yearly follow up. Chronic BPH sx are mild and stable. Some  urgency and frequency. Denies dysuria. Denies hematuria. Nocturia x1. He is taking no medication for LUT'S. He has failed Gemtesa. Hx of elevated PSA. Most recent PSA was 4.98 on 1/25. Prior PSA was  3.17 on 10/22. . Prior PSA was 2.27 on 6/21. Never had bx. No fhx of prostate cancer. ED is chronic. No medication for this. Patient recently (56 years) Had unremarkable CT 7/24.      Review of Systems   Constitutional:  Negative for chills and fever.   HENT: Negative.     Eyes: Negative.    Respiratory:  Negative for cough and shortness of breath.    Cardiovascular:  Negative for chest pain and leg swelling.   Gastrointestinal:  Negative for nausea.   Endocrine: Negative.    Genitourinary:  Negative for difficulty urinating.        Negative except for documented in HPI   Allergic/Immunologic: Negative.    Neurological:         Alert & oriented X 3   Hematological:         Denies blood thinners   Psychiatric/Behavioral: Negative.         Objective   Physical Exam  Vitals and nursing note reviewed.   Constitutional:       General: He is not in acute distress.     Appearance: Normal appearance.   Pulmonary:      Effort: Pulmonary effort is normal.   Abdominal:      Tenderness: There is no abdominal tenderness.   Genitourinary:     Comments: Kidneys non palpable bilaterally  Bladder non palpable or tender  Scrotum no mass, No hydrocele  Epididymis- No spermatocele. Non Tender.  Testicles: No mass. SYmmetric  Urethra: No discharge  Penis within normal limits... No lesions. Uncircumcised. No phimosis  Prostate - symmetric, no nodules. BENIGN  Seminal Vesicals: No mass.  Sphincter tone: normal  Neurological:      Mental Status: He is alert.         Assessment/Plan       Diagnoses and all orders for this visit:  Nocturia  Benign prostatic hyperplasia with urinary obstruction  and other lower urinary tract symptoms  Erectile dysfunction, unspecified erectile dysfunction type      All available PSA values reviewed, Options discussed. Questions answered.  Discussed rise in PSA .Discussed MRI but will recheck PSA in 6 months prior to making this decision   Diet changes for prostate health discussed and educational information given. Pros/Cons of prostate health supplements discussed.   Treatment options for LUTS reviewed  Discussed timed voiding. Discussed fluid and caffeine intake  Treatment options for ED reviewed-Observe  Lifestyle change to help prevent UTIs discussed. Encouraged fluid intake.  CT reviewed-Unremarkable    F/U 6 months

## 2025-02-05 ENCOUNTER — APPOINTMENT (OUTPATIENT)
Dept: UROLOGY | Facility: CLINIC | Age: 80
End: 2025-02-05
Payer: MEDICARE

## 2025-02-05 ENCOUNTER — PHARMACY VISIT (OUTPATIENT)
Dept: PHARMACY | Facility: CLINIC | Age: 80
End: 2025-02-05
Payer: COMMERCIAL

## 2025-02-05 VITALS
WEIGHT: 219.8 LBS | SYSTOLIC BLOOD PRESSURE: 134 MMHG | HEIGHT: 67 IN | BODY MASS INDEX: 34.5 KG/M2 | DIASTOLIC BLOOD PRESSURE: 84 MMHG

## 2025-02-05 DIAGNOSIS — N13.8 BENIGN PROSTATIC HYPERPLASIA WITH URINARY OBSTRUCTION AND OTHER LOWER URINARY TRACT SYMPTOMS: ICD-10-CM

## 2025-02-05 DIAGNOSIS — N40.1 BENIGN PROSTATIC HYPERPLASIA WITH URINARY OBSTRUCTION AND OTHER LOWER URINARY TRACT SYMPTOMS: ICD-10-CM

## 2025-02-05 DIAGNOSIS — R97.20 ELEVATED PSA: ICD-10-CM

## 2025-02-05 DIAGNOSIS — N52.9 ERECTILE DYSFUNCTION, UNSPECIFIED ERECTILE DYSFUNCTION TYPE: ICD-10-CM

## 2025-02-05 DIAGNOSIS — R35.1 NOCTURIA: Primary | ICD-10-CM

## 2025-02-05 PROCEDURE — 3079F DIAST BP 80-89 MM HG: CPT | Performed by: UROLOGY

## 2025-02-05 PROCEDURE — 99214 OFFICE O/P EST MOD 30 MIN: CPT | Performed by: UROLOGY

## 2025-02-05 PROCEDURE — 1123F ACP DISCUSS/DSCN MKR DOCD: CPT | Performed by: UROLOGY

## 2025-02-05 PROCEDURE — 1157F ADVNC CARE PLAN IN RCRD: CPT | Performed by: UROLOGY

## 2025-02-05 PROCEDURE — 1159F MED LIST DOCD IN RCRD: CPT | Performed by: UROLOGY

## 2025-02-05 PROCEDURE — 1036F TOBACCO NON-USER: CPT | Performed by: UROLOGY

## 2025-02-05 PROCEDURE — 3075F SYST BP GE 130 - 139MM HG: CPT | Performed by: UROLOGY

## 2025-02-17 PROCEDURE — RXMED WILLOW AMBULATORY MEDICATION CHARGE

## 2025-02-25 ENCOUNTER — PHARMACY VISIT (OUTPATIENT)
Dept: PHARMACY | Facility: CLINIC | Age: 80
End: 2025-02-25
Payer: COMMERCIAL

## 2025-02-25 PROCEDURE — RXMED WILLOW AMBULATORY MEDICATION CHARGE

## 2025-03-05 DIAGNOSIS — K21.9 GASTROESOPHAGEAL REFLUX DISEASE WITHOUT ESOPHAGITIS: ICD-10-CM

## 2025-03-05 PROCEDURE — RXMED WILLOW AMBULATORY MEDICATION CHARGE

## 2025-03-05 RX ORDER — OMEPRAZOLE 20 MG/1
20 CAPSULE, DELAYED RELEASE ORAL DAILY
Qty: 90 CAPSULE | Refills: 3 | Status: SHIPPED | OUTPATIENT
Start: 2025-03-05

## 2025-03-07 DIAGNOSIS — F90.9 ATTENTION DEFICIT HYPERACTIVITY DISORDER (ADHD), UNSPECIFIED ADHD TYPE: ICD-10-CM

## 2025-03-07 PROCEDURE — RXMED WILLOW AMBULATORY MEDICATION CHARGE

## 2025-03-07 RX ORDER — METHYLPHENIDATE HYDROCHLORIDE 5 MG/1
5 TABLET ORAL DAILY
Qty: 24 TABLET | Refills: 0 | Status: SHIPPED | OUTPATIENT
Start: 2025-03-07

## 2025-03-07 NOTE — TELEPHONE ENCOUNTER
Rx pended for you for 24 pills and would like it sent to [facility] pharmacy. Patient is scheduled to establish with you on 4/1/25.

## 2025-03-08 ENCOUNTER — PHARMACY VISIT (OUTPATIENT)
Dept: PHARMACY | Facility: CLINIC | Age: 80
End: 2025-03-08
Payer: COMMERCIAL

## 2025-03-10 ENCOUNTER — PHARMACY VISIT (OUTPATIENT)
Dept: PHARMACY | Facility: CLINIC | Age: 80
End: 2025-03-10
Payer: COMMERCIAL

## 2025-03-27 ENCOUNTER — PHARMACY VISIT (OUTPATIENT)
Dept: PHARMACY | Facility: CLINIC | Age: 80
End: 2025-03-27
Payer: COMMERCIAL

## 2025-03-27 PROCEDURE — RXMED WILLOW AMBULATORY MEDICATION CHARGE

## 2025-03-30 DIAGNOSIS — I50.43 CHF (CONGESTIVE HEART FAILURE), NYHA CLASS I, ACUTE ON CHRONIC, COMBINED: ICD-10-CM

## 2025-04-01 ENCOUNTER — APPOINTMENT (OUTPATIENT)
Dept: PRIMARY CARE | Facility: CLINIC | Age: 80
End: 2025-04-01
Payer: MEDICARE

## 2025-04-01 DIAGNOSIS — F90.9 ATTENTION DEFICIT HYPERACTIVITY DISORDER (ADHD), UNSPECIFIED ADHD TYPE: ICD-10-CM

## 2025-04-01 PROCEDURE — RXMED WILLOW AMBULATORY MEDICATION CHARGE

## 2025-04-01 RX ORDER — METHYLPHENIDATE HYDROCHLORIDE 5 MG/1
5 TABLET ORAL DAILY
Qty: 30 TABLET | Refills: 0 | Status: SHIPPED | OUTPATIENT
Start: 2025-04-01

## 2025-04-01 RX ORDER — FUROSEMIDE 20 MG/1
20 TABLET ORAL DAILY
Qty: 90 TABLET | Refills: 0 | Status: SHIPPED | OUTPATIENT
Start: 2025-04-01 | End: 2025-06-30

## 2025-04-02 ENCOUNTER — PHARMACY VISIT (OUTPATIENT)
Dept: PHARMACY | Facility: CLINIC | Age: 80
End: 2025-04-02
Payer: COMMERCIAL

## 2025-04-11 ENCOUNTER — APPOINTMENT (OUTPATIENT)
Dept: PRIMARY CARE | Facility: CLINIC | Age: 80
End: 2025-04-11
Payer: MEDICARE

## 2025-04-18 ENCOUNTER — PHARMACY VISIT (OUTPATIENT)
Dept: PHARMACY | Facility: CLINIC | Age: 80
End: 2025-04-18
Payer: COMMERCIAL

## 2025-04-18 PROCEDURE — RXMED WILLOW AMBULATORY MEDICATION CHARGE

## 2025-04-22 PROCEDURE — RXMED WILLOW AMBULATORY MEDICATION CHARGE

## 2025-04-24 ENCOUNTER — APPOINTMENT (OUTPATIENT)
Dept: PRIMARY CARE | Facility: CLINIC | Age: 80
End: 2025-04-24
Payer: MEDICARE

## 2025-04-24 VITALS
HEIGHT: 67 IN | DIASTOLIC BLOOD PRESSURE: 92 MMHG | WEIGHT: 221.9 LBS | BODY MASS INDEX: 34.83 KG/M2 | HEART RATE: 76 BPM | SYSTOLIC BLOOD PRESSURE: 141 MMHG

## 2025-04-24 DIAGNOSIS — R53.1 GENERALIZED WEAKNESS: ICD-10-CM

## 2025-04-24 DIAGNOSIS — I48.0 PAROXYSMAL ATRIAL FIBRILLATION (MULTI): ICD-10-CM

## 2025-04-24 DIAGNOSIS — I50.43 CHF (CONGESTIVE HEART FAILURE), NYHA CLASS I, ACUTE ON CHRONIC, COMBINED: ICD-10-CM

## 2025-04-24 DIAGNOSIS — N18.32 CHRONIC KIDNEY DISEASE, STAGE 3B (MULTI): Primary | ICD-10-CM

## 2025-04-24 DIAGNOSIS — E66.01 CLASS 2 SEVERE OBESITY WITH BODY MASS INDEX (BMI) OF 35 TO 39.9 WITH SERIOUS COMORBIDITY: ICD-10-CM

## 2025-04-24 DIAGNOSIS — R73.01 IMPAIRED FASTING GLUCOSE: ICD-10-CM

## 2025-04-24 DIAGNOSIS — I48.11 LONGSTANDING PERSISTENT ATRIAL FIBRILLATION (MULTI): ICD-10-CM

## 2025-04-24 DIAGNOSIS — I50.33 ACUTE ON CHRONIC DIASTOLIC (CONGESTIVE) HEART FAILURE: ICD-10-CM

## 2025-04-24 DIAGNOSIS — E66.812 CLASS 2 SEVERE OBESITY WITH BODY MASS INDEX (BMI) OF 35 TO 39.9 WITH SERIOUS COMORBIDITY: ICD-10-CM

## 2025-04-24 PROCEDURE — 1036F TOBACCO NON-USER: CPT

## 2025-04-24 PROCEDURE — 1157F ADVNC CARE PLAN IN RCRD: CPT

## 2025-04-24 PROCEDURE — 1160F RVW MEDS BY RX/DR IN RCRD: CPT

## 2025-04-24 PROCEDURE — 1159F MED LIST DOCD IN RCRD: CPT

## 2025-04-24 PROCEDURE — 99214 OFFICE O/P EST MOD 30 MIN: CPT

## 2025-04-24 PROCEDURE — 1123F ACP DISCUSS/DSCN MKR DOCD: CPT

## 2025-04-24 PROCEDURE — 3080F DIAST BP >= 90 MM HG: CPT

## 2025-04-24 PROCEDURE — 3077F SYST BP >= 140 MM HG: CPT

## 2025-04-24 ASSESSMENT — ENCOUNTER SYMPTOMS
FATIGUE: 0
COUGH: 0
DIARRHEA: 0
HEADACHES: 0
LIGHT-HEADEDNESS: 0
CONSTIPATION: 0
CHILLS: 0
SHORTNESS OF BREATH: 0
VOMITING: 0
FEVER: 0
PALPITATIONS: 0
NAUSEA: 0

## 2025-04-24 ASSESSMENT — PATIENT HEALTH QUESTIONNAIRE - PHQ9
1. LITTLE INTEREST OR PLEASURE IN DOING THINGS: NOT AT ALL
SUM OF ALL RESPONSES TO PHQ9 QUESTIONS 1 AND 2: 0
2. FEELING DOWN, DEPRESSED OR HOPELESS: NOT AT ALL

## 2025-04-24 NOTE — PROGRESS NOTES
"Subjective   Patient ID: Og Cooper is a 79 y.o. male who presents for [facility].    HPI   Here today to establish with me  He follows with cardiology at [facility] for Afib, pacemaker and valve replacement. He follows with them yearly.   ADHD, he is managed with Ritalin 5 mg daily   He did mention wanting to lose about 40 pounds for his overall health. Recommend weight watchers due to the nutrition and diet plan that the ap has. He has done aquatic therapy in the past and would like to get back into that.   Review of Systems   Constitutional:  Negative for chills, fatigue and fever.   Respiratory:  Negative for cough and shortness of breath.    Cardiovascular:  Negative for chest pain, palpitations and leg swelling.   Gastrointestinal:  Negative for constipation, diarrhea, nausea and vomiting.   Neurological:  Negative for light-headedness and headaches.       Objective   BP (!) 141/92 (Patient Position: Sitting)   Pulse 76   Ht 1.702 m (5' 7\")   Wt 101 kg (221 lb 14.4 oz)   BMI 34.75 kg/m²     Physical Exam  Cardiovascular:      Rate and Rhythm: Normal rate and regular rhythm.      Heart sounds: Normal heart sounds.   Pulmonary:      Breath sounds: Normal breath sounds.   Musculoskeletal:      Right lower leg: No edema.   Skin:     Capillary Refill: Capillary refill takes less than 2 seconds.   Neurological:      Mental Status: He is alert and oriented to person, place, and time.         Assessment/Plan   Problem List Items Addressed This Visit           ICD-10-CM    RESOLVED: Atrial fibrillation (Multi) I48.91    Relevant Orders    Referral to Physical Therapy    CBC and Auto Differential    Lipid Panel    Class 2 severe obesity with body mass index (BMI) of 35 to 39.9 with serious comorbidity E66.812, E66.01    Relevant Orders    Referral to Physical Therapy    Hemoglobin A1C    RESOLVED: CHF (congestive heart failure), NYHA class I, acute on chronic, combined I50.43    Relevant Orders    Referral to " Physical Therapy    Comprehensive Metabolic Panel    Chronic kidney disease, stage 3b (Multi) - Primary N18.32    Relevant Orders    Comprehensive Metabolic Panel    Hemoglobin A1C    Lipid Panel     Other Visit Diagnoses         Codes      Acute on chronic diastolic (congestive) heart failure     I50.33    Relevant Orders    Lipid Panel      Generalized weakness     R53.1    Relevant Orders    Referral to Physical Therapy      Impaired fasting glucose     R73.01    Relevant Orders    Hemoglobin A1C             Htn, afib, pacemaker  -Continue lisinopril 10mg daily  -Continue metoprolol 12.5 mg daily   -Continue lipitor 40 mg nightly   -Continue norvasc 5mg daily   -Continue eliquis 5 mg   -Continue ASA 81 mg daily     CHF  -Continue following with cardiology   -Continue Lasix 20 mg daily     Depression and anxiety  -Continue wellbutrin 300 mg daily   -Continue trazodone nightly    He would also like a PT referral at [facility] to get back into aquatic therapy

## 2025-04-27 DIAGNOSIS — E78.2 MIXED HYPERLIPIDEMIA: ICD-10-CM

## 2025-04-29 PROCEDURE — RXMED WILLOW AMBULATORY MEDICATION CHARGE

## 2025-04-29 RX ORDER — ATORVASTATIN CALCIUM 40 MG/1
40 TABLET, FILM COATED ORAL NIGHTLY
Qty: 90 TABLET | Refills: 3 | Status: SHIPPED | OUTPATIENT
Start: 2025-04-29

## 2025-05-01 ENCOUNTER — TELEPHONE (OUTPATIENT)
Dept: PRIMARY CARE | Facility: CLINIC | Age: 80
End: 2025-05-01
Payer: MEDICARE

## 2025-05-01 ENCOUNTER — PHARMACY VISIT (OUTPATIENT)
Dept: PHARMACY | Facility: CLINIC | Age: 80
End: 2025-05-01
Payer: COMMERCIAL

## 2025-05-01 DIAGNOSIS — F90.9 ATTENTION DEFICIT HYPERACTIVITY DISORDER (ADHD), UNSPECIFIED ADHD TYPE: ICD-10-CM

## 2025-05-01 PROCEDURE — RXMED WILLOW AMBULATORY MEDICATION CHARGE

## 2025-05-01 RX ORDER — METHYLPHENIDATE HYDROCHLORIDE 5 MG/1
5 TABLET ORAL DAILY
Qty: 30 TABLET | Refills: 0 | Status: SHIPPED | OUTPATIENT
Start: 2025-05-01

## 2025-05-01 NOTE — TELEPHONE ENCOUNTER
Message from daughter requesting a full month of Methylphenidate     [facility]

## 2025-05-02 ENCOUNTER — TELEPHONE (OUTPATIENT)
Dept: PRIMARY CARE | Facility: CLINIC | Age: 80
End: 2025-05-02
Payer: MEDICARE

## 2025-05-02 DIAGNOSIS — F90.9 ATTENTION DEFICIT HYPERACTIVITY DISORDER (ADHD), UNSPECIFIED ADHD TYPE: ICD-10-CM

## 2025-05-02 RX ORDER — METHYLPHENIDATE HYDROCHLORIDE 5 MG/1
5 TABLET ORAL DAILY
Qty: 30 TABLET | Refills: 0 | OUTPATIENT
Start: 2025-05-02

## 2025-05-19 DIAGNOSIS — I10 ESSENTIAL (PRIMARY) HYPERTENSION: ICD-10-CM

## 2025-05-19 PROCEDURE — RXMED WILLOW AMBULATORY MEDICATION CHARGE

## 2025-05-20 PROCEDURE — RXMED WILLOW AMBULATORY MEDICATION CHARGE

## 2025-05-20 RX ORDER — METOPROLOL SUCCINATE 25 MG/1
12.5 TABLET, EXTENDED RELEASE ORAL DAILY
Qty: 45 TABLET | Refills: 3 | Status: SHIPPED | OUTPATIENT
Start: 2025-05-20

## 2025-06-02 ENCOUNTER — PHARMACY VISIT (OUTPATIENT)
Dept: PHARMACY | Facility: CLINIC | Age: 80
End: 2025-06-02
Payer: COMMERCIAL

## 2025-06-02 DIAGNOSIS — F90.9 ATTENTION DEFICIT HYPERACTIVITY DISORDER (ADHD), UNSPECIFIED ADHD TYPE: ICD-10-CM

## 2025-06-03 PROCEDURE — RXMED WILLOW AMBULATORY MEDICATION CHARGE

## 2025-06-03 RX ORDER — METHYLPHENIDATE HYDROCHLORIDE 5 MG/1
5 TABLET ORAL DAILY
Qty: 30 TABLET | Refills: 0 | Status: SHIPPED | OUTPATIENT
Start: 2025-06-03

## 2025-06-05 ENCOUNTER — PHARMACY VISIT (OUTPATIENT)
Dept: PHARMACY | Facility: CLINIC | Age: 80
End: 2025-06-05
Payer: COMMERCIAL

## 2025-06-05 PROCEDURE — RXMED WILLOW AMBULATORY MEDICATION CHARGE

## 2025-06-06 LAB
ALBUMIN SERPL-MCNC: 4.2 G/DL (ref 3.6–5.1)
ALP SERPL-CCNC: 93 U/L (ref 35–144)
ALT SERPL-CCNC: 19 U/L (ref 9–46)
ANION GAP SERPL CALCULATED.4IONS-SCNC: 9 MMOL/L (CALC) (ref 7–17)
AST SERPL-CCNC: 23 U/L (ref 10–35)
BASOPHILS # BLD AUTO: 38 CELLS/UL (ref 0–200)
BASOPHILS NFR BLD AUTO: 0.5 %
BILIRUB SERPL-MCNC: 0.6 MG/DL (ref 0.2–1.2)
BUN SERPL-MCNC: 33 MG/DL (ref 7–25)
CALCIUM SERPL-MCNC: 9.4 MG/DL (ref 8.6–10.3)
CHLORIDE SERPL-SCNC: 106 MMOL/L (ref 98–110)
CHOLEST SERPL-MCNC: 139 MG/DL
CHOLEST/HDLC SERPL: 3.6 (CALC)
CO2 SERPL-SCNC: 26 MMOL/L (ref 20–32)
CREAT SERPL-MCNC: 1.46 MG/DL (ref 0.7–1.28)
EGFRCR SERPLBLD CKD-EPI 2021: 49 ML/MIN/1.73M2
EOSINOPHIL # BLD AUTO: 669 CELLS/UL (ref 15–500)
EOSINOPHIL NFR BLD AUTO: 8.8 %
ERYTHROCYTE [DISTWIDTH] IN BLOOD BY AUTOMATED COUNT: 14.6 % (ref 11–15)
EST. AVERAGE GLUCOSE BLD GHB EST-MCNC: 128 MG/DL
EST. AVERAGE GLUCOSE BLD GHB EST-SCNC: 7.1 MMOL/L
GLUCOSE SERPL-MCNC: 86 MG/DL (ref 65–99)
HBA1C MFR BLD: 6.1 %
HCT VFR BLD AUTO: 41.4 % (ref 38.5–50)
HDLC SERPL-MCNC: 39 MG/DL
HGB BLD-MCNC: 12.8 G/DL (ref 13.2–17.1)
LDLC SERPL CALC-MCNC: 77 MG/DL (CALC)
LYMPHOCYTES # BLD AUTO: 1269 CELLS/UL (ref 850–3900)
LYMPHOCYTES NFR BLD AUTO: 16.7 %
MCH RBC QN AUTO: 25.9 PG (ref 27–33)
MCHC RBC AUTO-ENTMCNC: 30.9 G/DL (ref 32–36)
MCV RBC AUTO: 83.6 FL (ref 80–100)
MONOCYTES # BLD AUTO: 707 CELLS/UL (ref 200–950)
MONOCYTES NFR BLD AUTO: 9.3 %
NEUTROPHILS # BLD AUTO: 4917 CELLS/UL (ref 1500–7800)
NEUTROPHILS NFR BLD AUTO: 64.7 %
NONHDLC SERPL-MCNC: 100 MG/DL (CALC)
PLATELET # BLD AUTO: 172 THOUSAND/UL (ref 140–400)
PMV BLD REES-ECKER: 11.3 FL (ref 7.5–12.5)
POTASSIUM SERPL-SCNC: 4.4 MMOL/L (ref 3.5–5.3)
PROT SERPL-MCNC: 6.8 G/DL (ref 6.1–8.1)
RBC # BLD AUTO: 4.95 MILLION/UL (ref 4.2–5.8)
SODIUM SERPL-SCNC: 141 MMOL/L (ref 135–146)
TRIGL SERPL-MCNC: 126 MG/DL
WBC # BLD AUTO: 7.6 THOUSAND/UL (ref 3.8–10.8)

## 2025-06-23 PROCEDURE — RXMED WILLOW AMBULATORY MEDICATION CHARGE

## 2025-06-25 ENCOUNTER — PHARMACY VISIT (OUTPATIENT)
Dept: PHARMACY | Facility: CLINIC | Age: 80
End: 2025-06-25
Payer: COMMERCIAL

## 2025-06-25 PROCEDURE — RXMED WILLOW AMBULATORY MEDICATION CHARGE

## 2025-06-25 RX ORDER — TRIAMCINOLONE ACETONIDE 1 MG/G
CREAM TOPICAL
Qty: 454 G | Refills: 1 | OUTPATIENT
Start: 2025-06-25

## 2025-06-27 ENCOUNTER — PHARMACY VISIT (OUTPATIENT)
Dept: PHARMACY | Facility: CLINIC | Age: 80
End: 2025-06-27
Payer: COMMERCIAL

## 2025-06-30 ENCOUNTER — TELEPHONE (OUTPATIENT)
Dept: PRIMARY CARE | Facility: CLINIC | Age: 80
End: 2025-06-30
Payer: MEDICARE

## 2025-06-30 DIAGNOSIS — F90.9 ATTENTION DEFICIT HYPERACTIVITY DISORDER (ADHD), UNSPECIFIED ADHD TYPE: ICD-10-CM

## 2025-06-30 NOTE — TELEPHONE ENCOUNTER
Call Center TCM Note    Flowsheet Row Responses   [facility] patient discharged from? [place]   Does the patient have one of the following disease processes/diagnoses(primary or secondary)? Other   TCM attempt successful? Yes          Clara Torrez RN    7/6/2022, 05:07 EDT       Requesting refill for Methylphenidate 5mg    [facility]

## 2025-07-01 DIAGNOSIS — R97.20 ELEVATED PSA: ICD-10-CM

## 2025-07-01 PROCEDURE — RXMED WILLOW AMBULATORY MEDICATION CHARGE

## 2025-07-01 RX ORDER — METHYLPHENIDATE HYDROCHLORIDE 5 MG/1
5 TABLET ORAL DAILY
Qty: 30 TABLET | Refills: 0 | Status: SHIPPED | OUTPATIENT
Start: 2025-07-01

## 2025-07-06 ENCOUNTER — PHARMACY VISIT (OUTPATIENT)
Dept: PHARMACY | Facility: CLINIC | Age: 80
End: 2025-07-06
Payer: COMMERCIAL

## 2025-07-10 DIAGNOSIS — I50.43 CHF (CONGESTIVE HEART FAILURE), NYHA CLASS I, ACUTE ON CHRONIC, COMBINED: ICD-10-CM

## 2025-07-11 RX ORDER — FUROSEMIDE 20 MG/1
20 TABLET ORAL DAILY
Qty: 90 TABLET | Refills: 0 | Status: SHIPPED | OUTPATIENT
Start: 2025-07-11 | End: 2025-10-09

## 2025-07-22 PROCEDURE — RXMED WILLOW AMBULATORY MEDICATION CHARGE

## 2025-07-26 PROCEDURE — RXMED WILLOW AMBULATORY MEDICATION CHARGE

## 2025-07-27 ENCOUNTER — PHARMACY VISIT (OUTPATIENT)
Dept: PHARMACY | Facility: CLINIC | Age: 80
End: 2025-07-27
Payer: COMMERCIAL

## 2025-07-30 DIAGNOSIS — F90.9 ATTENTION DEFICIT HYPERACTIVITY DISORDER (ADHD), UNSPECIFIED ADHD TYPE: ICD-10-CM

## 2025-07-30 PROCEDURE — RXMED WILLOW AMBULATORY MEDICATION CHARGE

## 2025-07-30 NOTE — TELEPHONE ENCOUNTER
Pts daughter called asking for his Ritalin to be filled a little early - He is going out of town this weekend for a couple weeks and his daughter wanted to get his pill containers set up before he left.

## 2025-07-31 ENCOUNTER — PHARMACY VISIT (OUTPATIENT)
Dept: PHARMACY | Facility: CLINIC | Age: 80
End: 2025-07-31
Payer: COMMERCIAL

## 2025-07-31 PROCEDURE — RXMED WILLOW AMBULATORY MEDICATION CHARGE

## 2025-07-31 RX ORDER — METHYLPHENIDATE HYDROCHLORIDE 5 MG/1
5 TABLET ORAL DAILY
Qty: 30 TABLET | Refills: 0 | Status: SHIPPED | OUTPATIENT
Start: 2025-07-31

## 2025-08-24 DIAGNOSIS — I48.21 PERMANENT ATRIAL FIBRILLATION (MULTI): ICD-10-CM

## 2025-08-24 PROCEDURE — RXMED WILLOW AMBULATORY MEDICATION CHARGE

## 2025-08-25 ENCOUNTER — PHARMACY VISIT (OUTPATIENT)
Dept: PHARMACY | Facility: CLINIC | Age: 80
End: 2025-08-25
Payer: COMMERCIAL

## 2025-08-25 PROCEDURE — RXMED WILLOW AMBULATORY MEDICATION CHARGE

## 2025-08-28 PROCEDURE — RXMED WILLOW AMBULATORY MEDICATION CHARGE

## 2025-08-28 RX ORDER — AMLODIPINE BESYLATE 5 MG/1
5 TABLET ORAL DAILY
Qty: 90 TABLET | Refills: 0 | Status: SHIPPED | OUTPATIENT
Start: 2025-08-28

## 2025-08-30 PROCEDURE — RXMED WILLOW AMBULATORY MEDICATION CHARGE

## 2025-09-02 ENCOUNTER — TELEPHONE (OUTPATIENT)
Dept: PRIMARY CARE | Facility: CLINIC | Age: 80
End: 2025-09-02
Payer: MEDICARE

## 2025-09-02 DIAGNOSIS — F90.9 ATTENTION DEFICIT HYPERACTIVITY DISORDER (ADHD), UNSPECIFIED ADHD TYPE: ICD-10-CM

## 2025-09-02 PROCEDURE — RXMED WILLOW AMBULATORY MEDICATION CHARGE

## 2025-09-04 ENCOUNTER — PHARMACY VISIT (OUTPATIENT)
Dept: PHARMACY | Facility: CLINIC | Age: 80
End: 2025-09-04
Payer: COMMERCIAL

## 2025-09-04 PROCEDURE — RXMED WILLOW AMBULATORY MEDICATION CHARGE

## 2025-09-04 RX ORDER — METHYLPHENIDATE HYDROCHLORIDE 5 MG/1
5 TABLET ORAL DAILY
Qty: 30 TABLET | Refills: 0 | Status: SHIPPED | OUTPATIENT
Start: 2025-09-04

## 2025-10-24 ENCOUNTER — APPOINTMENT (OUTPATIENT)
Dept: PRIMARY CARE | Facility: CLINIC | Age: 80
End: 2025-10-24
Payer: MEDICARE